# Patient Record
Sex: FEMALE | Race: OTHER
[De-identification: names, ages, dates, MRNs, and addresses within clinical notes are randomized per-mention and may not be internally consistent; named-entity substitution may affect disease eponyms.]

---

## 2019-03-20 PROBLEM — Z00.00 ENCOUNTER FOR PREVENTIVE HEALTH EXAMINATION: Status: ACTIVE | Noted: 2019-03-20

## 2019-04-25 ENCOUNTER — APPOINTMENT (OUTPATIENT)
Dept: NEUROLOGY | Facility: CLINIC | Age: 69
End: 2019-04-25

## 2020-10-07 ENCOUNTER — APPOINTMENT (OUTPATIENT)
Dept: NEUROLOGY | Facility: CLINIC | Age: 70
End: 2020-10-07
Payer: MEDICAID

## 2020-10-07 VITALS — BODY MASS INDEX: 24.1 KG/M2 | WEIGHT: 136 LBS | TEMPERATURE: 97.4 F | HEIGHT: 63 IN

## 2020-10-07 DIAGNOSIS — Z86.39 PERSONAL HISTORY OF OTHER ENDOCRINE, NUTRITIONAL AND METABOLIC DISEASE: ICD-10-CM

## 2020-10-07 DIAGNOSIS — Z86.79 PERSONAL HISTORY OF OTHER DISEASES OF THE CIRCULATORY SYSTEM: ICD-10-CM

## 2020-10-07 DIAGNOSIS — M48.02 SPINAL STENOSIS, CERVICAL REGION: ICD-10-CM

## 2020-10-07 DIAGNOSIS — I63.9 CEREBRAL INFARCTION, UNSPECIFIED: ICD-10-CM

## 2020-10-07 DIAGNOSIS — E11.42 TYPE 2 DIABETES MELLITUS WITH DIABETIC POLYNEUROPATHY: ICD-10-CM

## 2020-10-07 PROCEDURE — 99204 OFFICE O/P NEW MOD 45 MIN: CPT

## 2020-10-07 RX ORDER — GABAPENTIN 300 MG
300 TABLET ORAL
Refills: 0 | Status: ACTIVE | COMMUNITY

## 2020-10-07 RX ORDER — METFORMIN HYDROCHLORIDE 625 MG/1
TABLET ORAL
Refills: 0 | Status: ACTIVE | COMMUNITY

## 2020-10-07 RX ORDER — OMEPRAZOLE 20 MG/1
TABLET, DELAYED RELEASE ORAL
Refills: 0 | Status: ACTIVE | COMMUNITY

## 2020-10-07 RX ORDER — ATORVASTATIN CALCIUM 20 MG/1
20 TABLET, FILM COATED ORAL
Refills: 0 | Status: ACTIVE | COMMUNITY

## 2020-10-07 RX ORDER — LOSARTAN POTASSIUM AND HYDROCHLOROTHIAZIDE 12.5; 5 MG/1; MG/1
50-12.5 TABLET ORAL
Refills: 0 | Status: ACTIVE | COMMUNITY

## 2020-10-07 RX ORDER — SITAGLIPTIN 100 MG/1
TABLET, FILM COATED ORAL
Refills: 0 | Status: ACTIVE | COMMUNITY

## 2020-10-07 RX ORDER — ASPIRIN 81 MG
81 TABLET, DELAYED RELEASE (ENTERIC COATED) ORAL
Refills: 0 | Status: ACTIVE | COMMUNITY

## 2020-10-28 ENCOUNTER — APPOINTMENT (OUTPATIENT)
Dept: NEUROLOGY | Facility: CLINIC | Age: 70
End: 2020-10-28

## 2020-11-16 ENCOUNTER — NON-APPOINTMENT (OUTPATIENT)
Age: 70
End: 2020-11-16

## 2021-02-16 ENCOUNTER — INPATIENT (INPATIENT)
Facility: HOSPITAL | Age: 71
LOS: 5 days | Discharge: HOME HEALTH | DRG: 139 | End: 2021-02-22
Attending: HOSPITALIST | Admitting: INTERNAL MEDICINE
Payer: MEDICAID

## 2021-02-16 VITALS
HEIGHT: 68 IN | WEIGHT: 141.98 LBS | HEART RATE: 86 BPM | SYSTOLIC BLOOD PRESSURE: 250 MMHG | TEMPERATURE: 98 F | OXYGEN SATURATION: 86 % | RESPIRATION RATE: 18 BRPM | DIASTOLIC BLOOD PRESSURE: 100 MMHG

## 2021-02-16 DIAGNOSIS — J18.9 PNEUMONIA, UNSPECIFIED ORGANISM: ICD-10-CM

## 2021-02-16 DIAGNOSIS — R09.89 OTHER SPECIFIED SYMPTOMS AND SIGNS INVOLVING THE CIRCULATORY AND RESPIRATORY SYSTEMS: ICD-10-CM

## 2021-02-16 LAB
ADD ON TEST-SPECIMEN IN LAB: SIGNIFICANT CHANGE UP
ADD ON TEST-SPECIMEN IN LAB: SIGNIFICANT CHANGE UP
ALBUMIN SERPL ELPH-MCNC: 3.8 G/DL — SIGNIFICANT CHANGE UP (ref 3.3–5)
ALP SERPL-CCNC: 138 U/L — HIGH (ref 40–120)
ALT FLD-CCNC: 145 U/L — HIGH (ref 12–78)
ANION GAP SERPL CALC-SCNC: 6 MMOL/L — SIGNIFICANT CHANGE UP (ref 5–17)
APPEARANCE UR: CLEAR — SIGNIFICANT CHANGE UP
APTT BLD: 35.6 SEC — HIGH (ref 27.5–35.5)
AST SERPL-CCNC: 60 U/L — HIGH (ref 15–37)
BASE EXCESS BLDV CALC-SCNC: 0.4 MMOL/L — SIGNIFICANT CHANGE UP (ref -2–2)
BASOPHILS # BLD AUTO: 0.03 K/UL — SIGNIFICANT CHANGE UP (ref 0–0.2)
BASOPHILS NFR BLD AUTO: 0.4 % — SIGNIFICANT CHANGE UP (ref 0–2)
BILIRUB SERPL-MCNC: 0.8 MG/DL — SIGNIFICANT CHANGE UP (ref 0.2–1.2)
BILIRUB UR-MCNC: NEGATIVE — SIGNIFICANT CHANGE UP
BUN SERPL-MCNC: 16 MG/DL — SIGNIFICANT CHANGE UP (ref 7–23)
CALCIUM SERPL-MCNC: 10.4 MG/DL — HIGH (ref 8.5–10.1)
CHLORIDE SERPL-SCNC: 95 MMOL/L — LOW (ref 96–108)
CO2 SERPL-SCNC: 28 MMOL/L — SIGNIFICANT CHANGE UP (ref 22–31)
COLOR SPEC: YELLOW — SIGNIFICANT CHANGE UP
CREAT SERPL-MCNC: 0.71 MG/DL — SIGNIFICANT CHANGE UP (ref 0.5–1.3)
CRP SERPL-MCNC: 2.77 MG/DL — HIGH (ref 0–0.4)
D DIMER BLD IA.RAPID-MCNC: 1053 NG/ML DDU — HIGH
DIFF PNL FLD: NEGATIVE — SIGNIFICANT CHANGE UP
EOSINOPHIL # BLD AUTO: 0.14 K/UL — SIGNIFICANT CHANGE UP (ref 0–0.5)
EOSINOPHIL NFR BLD AUTO: 1.8 % — SIGNIFICANT CHANGE UP (ref 0–6)
FERRITIN SERPL-MCNC: 1095 NG/ML — HIGH (ref 15–150)
FIBRINOGEN PPP-MCNC: 658 MG/DL — HIGH (ref 290–520)
GLUCOSE SERPL-MCNC: 164 MG/DL — HIGH (ref 70–99)
GLUCOSE UR QL: NEGATIVE MG/DL — SIGNIFICANT CHANGE UP
HCO3 BLDV-SCNC: 26 MMOL/L — SIGNIFICANT CHANGE UP (ref 21–29)
HCT VFR BLD CALC: 37.9 % — SIGNIFICANT CHANGE UP (ref 34.5–45)
HGB BLD-MCNC: 12.6 G/DL — SIGNIFICANT CHANGE UP (ref 11.5–15.5)
IMM GRANULOCYTES NFR BLD AUTO: 0.6 % — SIGNIFICANT CHANGE UP (ref 0–1.5)
INR BLD: 1.09 RATIO — SIGNIFICANT CHANGE UP (ref 0.88–1.16)
KETONES UR-MCNC: NEGATIVE — SIGNIFICANT CHANGE UP
LACTATE SERPL-SCNC: 2.4 MMOL/L — HIGH (ref 0.7–2)
LDH SERPL L TO P-CCNC: 161 U/L — SIGNIFICANT CHANGE UP (ref 84–241)
LEUKOCYTE ESTERASE UR-ACNC: NEGATIVE — SIGNIFICANT CHANGE UP
LYMPHOCYTES # BLD AUTO: 0.97 K/UL — LOW (ref 1–3.3)
LYMPHOCYTES # BLD AUTO: 12.4 % — LOW (ref 13–44)
MCHC RBC-ENTMCNC: 29.7 PG — SIGNIFICANT CHANGE UP (ref 27–34)
MCHC RBC-ENTMCNC: 33.2 GM/DL — SIGNIFICANT CHANGE UP (ref 32–36)
MCV RBC AUTO: 89.4 FL — SIGNIFICANT CHANGE UP (ref 80–100)
MONOCYTES # BLD AUTO: 0.7 K/UL — SIGNIFICANT CHANGE UP (ref 0–0.9)
MONOCYTES NFR BLD AUTO: 8.9 % — SIGNIFICANT CHANGE UP (ref 2–14)
NEUTROPHILS # BLD AUTO: 5.96 K/UL — SIGNIFICANT CHANGE UP (ref 1.8–7.4)
NEUTROPHILS NFR BLD AUTO: 75.9 % — SIGNIFICANT CHANGE UP (ref 43–77)
NITRITE UR-MCNC: NEGATIVE — SIGNIFICANT CHANGE UP
PCO2 BLDV: 46 MMHG — SIGNIFICANT CHANGE UP (ref 35–50)
PH BLDV: 7.36 — SIGNIFICANT CHANGE UP (ref 7.35–7.45)
PH UR: 7 — SIGNIFICANT CHANGE UP (ref 5–8)
PLATELET # BLD AUTO: 126 K/UL — LOW (ref 150–400)
PO2 BLDV: 297 MMHG — HIGH (ref 25–45)
POTASSIUM SERPL-MCNC: 3.9 MMOL/L — SIGNIFICANT CHANGE UP (ref 3.5–5.3)
POTASSIUM SERPL-SCNC: 3.9 MMOL/L — SIGNIFICANT CHANGE UP (ref 3.5–5.3)
PROCALCITONIN SERPL-MCNC: 0.41 NG/ML — HIGH (ref 0.02–0.1)
PROT SERPL-MCNC: 8.4 GM/DL — HIGH (ref 6–8.3)
PROT UR-MCNC: 100 MG/DL
PROTHROM AB SERPL-ACNC: 12.6 SEC — SIGNIFICANT CHANGE UP (ref 10.6–13.6)
RAPID RVP RESULT: SIGNIFICANT CHANGE UP
RBC # BLD: 4.24 M/UL — SIGNIFICANT CHANGE UP (ref 3.8–5.2)
RBC # FLD: 15.6 % — HIGH (ref 10.3–14.5)
SAO2 % BLDV: 99 % — HIGH (ref 67–88)
SARS-COV-2 RNA SPEC QL NAA+PROBE: SIGNIFICANT CHANGE UP
SODIUM SERPL-SCNC: 129 MMOL/L — LOW (ref 135–145)
SP GR SPEC: 1.01 — SIGNIFICANT CHANGE UP (ref 1.01–1.02)
TROPONIN I SERPL-MCNC: 0.02 NG/ML — SIGNIFICANT CHANGE UP (ref 0.01–0.04)
TROPONIN I SERPL-MCNC: <0.015 NG/ML — SIGNIFICANT CHANGE UP (ref 0.01–0.04)
UROBILINOGEN FLD QL: NEGATIVE MG/DL — SIGNIFICANT CHANGE UP
WBC # BLD: 7.85 K/UL — SIGNIFICANT CHANGE UP (ref 3.8–10.5)
WBC # FLD AUTO: 7.85 K/UL — SIGNIFICANT CHANGE UP (ref 3.8–10.5)

## 2021-02-16 PROCEDURE — 81001 URINALYSIS AUTO W/SCOPE: CPT

## 2021-02-16 PROCEDURE — 85027 COMPLETE CBC AUTOMATED: CPT

## 2021-02-16 PROCEDURE — 82607 VITAMIN B-12: CPT

## 2021-02-16 PROCEDURE — 85025 COMPLETE CBC W/AUTO DIFF WBC: CPT

## 2021-02-16 PROCEDURE — 80053 COMPREHEN METABOLIC PANEL: CPT

## 2021-02-16 PROCEDURE — 93970 EXTREMITY STUDY: CPT

## 2021-02-16 PROCEDURE — 71275 CT ANGIOGRAPHY CHEST: CPT | Mod: 26

## 2021-02-16 PROCEDURE — 82962 GLUCOSE BLOOD TEST: CPT

## 2021-02-16 PROCEDURE — 99223 1ST HOSP IP/OBS HIGH 75: CPT | Mod: AI

## 2021-02-16 PROCEDURE — 71045 X-RAY EXAM CHEST 1 VIEW: CPT | Mod: 26

## 2021-02-16 PROCEDURE — 93005 ELECTROCARDIOGRAM TRACING: CPT

## 2021-02-16 PROCEDURE — 83010 ASSAY OF HAPTOGLOBIN QUANT: CPT

## 2021-02-16 PROCEDURE — 85045 AUTOMATED RETICULOCYTE COUNT: CPT

## 2021-02-16 PROCEDURE — 83540 ASSAY OF IRON: CPT

## 2021-02-16 PROCEDURE — 83036 HEMOGLOBIN GLYCOSYLATED A1C: CPT

## 2021-02-16 PROCEDURE — 86769 SARS-COV-2 COVID-19 ANTIBODY: CPT

## 2021-02-16 PROCEDURE — 80048 BASIC METABOLIC PNL TOTAL CA: CPT

## 2021-02-16 PROCEDURE — 36415 COLL VENOUS BLD VENIPUNCTURE: CPT

## 2021-02-16 PROCEDURE — 82728 ASSAY OF FERRITIN: CPT

## 2021-02-16 PROCEDURE — 82247 BILIRUBIN TOTAL: CPT

## 2021-02-16 PROCEDURE — 74177 CT ABD & PELVIS W/CONTRAST: CPT | Mod: 26

## 2021-02-16 PROCEDURE — 82248 BILIRUBIN DIRECT: CPT

## 2021-02-16 PROCEDURE — 71275 CT ANGIOGRAPHY CHEST: CPT

## 2021-02-16 PROCEDURE — 86803 HEPATITIS C AB TEST: CPT

## 2021-02-16 PROCEDURE — 83550 IRON BINDING TEST: CPT

## 2021-02-16 PROCEDURE — 93010 ELECTROCARDIOGRAM REPORT: CPT

## 2021-02-16 PROCEDURE — 84484 ASSAY OF TROPONIN QUANT: CPT

## 2021-02-16 PROCEDURE — 74177 CT ABD & PELVIS W/CONTRAST: CPT

## 2021-02-16 PROCEDURE — 93970 EXTREMITY STUDY: CPT | Mod: 26

## 2021-02-16 PROCEDURE — 93306 TTE W/DOPPLER COMPLETE: CPT

## 2021-02-16 PROCEDURE — 83605 ASSAY OF LACTIC ACID: CPT

## 2021-02-16 RX ORDER — HYDRALAZINE HCL 50 MG
10 TABLET ORAL EVERY 8 HOURS
Refills: 0 | Status: DISCONTINUED | OUTPATIENT
Start: 2021-02-16 | End: 2021-02-22

## 2021-02-16 RX ORDER — ASPIRIN/CALCIUM CARB/MAGNESIUM 324 MG
81 TABLET ORAL DAILY
Refills: 0 | Status: DISCONTINUED | OUTPATIENT
Start: 2021-02-16 | End: 2021-02-22

## 2021-02-16 RX ORDER — ENOXAPARIN SODIUM 100 MG/ML
40 INJECTION SUBCUTANEOUS DAILY
Refills: 0 | Status: DISCONTINUED | OUTPATIENT
Start: 2021-02-16 | End: 2021-02-22

## 2021-02-16 RX ORDER — IPRATROPIUM BROMIDE 0.2 MG/ML
1 SOLUTION, NON-ORAL INHALATION ONCE
Refills: 0 | Status: COMPLETED | OUTPATIENT
Start: 2021-02-16 | End: 2021-02-16

## 2021-02-16 RX ORDER — SENNA PLUS 8.6 MG/1
2 TABLET ORAL AT BEDTIME
Refills: 0 | Status: DISCONTINUED | OUTPATIENT
Start: 2021-02-16 | End: 2021-02-22

## 2021-02-16 RX ORDER — CEFEPIME 1 G/1
2000 INJECTION, POWDER, FOR SOLUTION INTRAMUSCULAR; INTRAVENOUS ONCE
Refills: 0 | Status: COMPLETED | OUTPATIENT
Start: 2021-02-16 | End: 2021-02-16

## 2021-02-16 RX ORDER — DEXTROSE 50 % IN WATER 50 %
12.5 SYRINGE (ML) INTRAVENOUS ONCE
Refills: 0 | Status: DISCONTINUED | OUTPATIENT
Start: 2021-02-16 | End: 2021-02-22

## 2021-02-16 RX ORDER — INSULIN GLARGINE 100 [IU]/ML
68 INJECTION, SOLUTION SUBCUTANEOUS AT BEDTIME
Refills: 0 | Status: DISCONTINUED | OUTPATIENT
Start: 2021-02-16 | End: 2021-02-19

## 2021-02-16 RX ORDER — POLYETHYLENE GLYCOL 3350 17 G/17G
17 POWDER, FOR SOLUTION ORAL EVERY 12 HOURS
Refills: 0 | Status: DISCONTINUED | OUTPATIENT
Start: 2021-02-16 | End: 2021-02-22

## 2021-02-16 RX ORDER — OXYBUTYNIN CHLORIDE 5 MG
10 TABLET ORAL
Refills: 0 | Status: DISCONTINUED | OUTPATIENT
Start: 2021-02-16 | End: 2021-02-16

## 2021-02-16 RX ORDER — SODIUM CHLORIDE 9 MG/ML
1000 INJECTION, SOLUTION INTRAVENOUS
Refills: 0 | Status: DISCONTINUED | OUTPATIENT
Start: 2021-02-16 | End: 2021-02-22

## 2021-02-16 RX ORDER — HYDRALAZINE HCL 50 MG
5 TABLET ORAL ONCE
Refills: 0 | Status: COMPLETED | OUTPATIENT
Start: 2021-02-16 | End: 2021-02-16

## 2021-02-16 RX ORDER — GABAPENTIN 400 MG/1
300 CAPSULE ORAL THREE TIMES A DAY
Refills: 0 | Status: DISCONTINUED | OUTPATIENT
Start: 2021-02-16 | End: 2021-02-22

## 2021-02-16 RX ORDER — DEXTROSE 50 % IN WATER 50 %
25 SYRINGE (ML) INTRAVENOUS ONCE
Refills: 0 | Status: DISCONTINUED | OUTPATIENT
Start: 2021-02-16 | End: 2021-02-22

## 2021-02-16 RX ORDER — GLUCAGON INJECTION, SOLUTION 0.5 MG/.1ML
1 INJECTION, SOLUTION SUBCUTANEOUS ONCE
Refills: 0 | Status: DISCONTINUED | OUTPATIENT
Start: 2021-02-16 | End: 2021-02-22

## 2021-02-16 RX ORDER — DEXAMETHASONE 0.5 MG/5ML
6 ELIXIR ORAL ONCE
Refills: 0 | Status: COMPLETED | OUTPATIENT
Start: 2021-02-16 | End: 2021-02-16

## 2021-02-16 RX ORDER — AZITHROMYCIN 500 MG/1
500 TABLET, FILM COATED ORAL DAILY
Refills: 0 | Status: DISCONTINUED | OUTPATIENT
Start: 2021-02-16 | End: 2021-02-21

## 2021-02-16 RX ORDER — TAMSULOSIN HYDROCHLORIDE 0.4 MG/1
0.4 CAPSULE ORAL AT BEDTIME
Refills: 0 | Status: DISCONTINUED | OUTPATIENT
Start: 2021-02-16 | End: 2021-02-22

## 2021-02-16 RX ORDER — INSULIN LISPRO 100/ML
VIAL (ML) SUBCUTANEOUS AT BEDTIME
Refills: 0 | Status: DISCONTINUED | OUTPATIENT
Start: 2021-02-16 | End: 2021-02-22

## 2021-02-16 RX ORDER — CEFEPIME 1 G/1
2000 INJECTION, POWDER, FOR SOLUTION INTRAMUSCULAR; INTRAVENOUS ONCE
Refills: 0 | Status: DISCONTINUED | OUTPATIENT
Start: 2021-02-16 | End: 2021-02-16

## 2021-02-16 RX ORDER — ACETAMINOPHEN 500 MG
650 TABLET ORAL EVERY 4 HOURS
Refills: 0 | Status: DISCONTINUED | OUTPATIENT
Start: 2021-02-16 | End: 2021-02-22

## 2021-02-16 RX ORDER — INSULIN LISPRO 100/ML
VIAL (ML) SUBCUTANEOUS
Refills: 0 | Status: DISCONTINUED | OUTPATIENT
Start: 2021-02-16 | End: 2021-02-22

## 2021-02-16 RX ORDER — ALBUTEROL 90 UG/1
2 AEROSOL, METERED ORAL ONCE
Refills: 0 | Status: COMPLETED | OUTPATIENT
Start: 2021-02-16 | End: 2021-02-16

## 2021-02-16 RX ORDER — SODIUM CHLORIDE 9 MG/ML
1 INJECTION INTRAMUSCULAR; INTRAVENOUS; SUBCUTANEOUS
Refills: 0 | Status: DISCONTINUED | OUTPATIENT
Start: 2021-02-16 | End: 2021-02-22

## 2021-02-16 RX ORDER — ATORVASTATIN CALCIUM 80 MG/1
20 TABLET, FILM COATED ORAL AT BEDTIME
Refills: 0 | Status: DISCONTINUED | OUTPATIENT
Start: 2021-02-16 | End: 2021-02-22

## 2021-02-16 RX ORDER — BETHANECHOL CHLORIDE 25 MG
25 TABLET ORAL EVERY 8 HOURS
Refills: 0 | Status: DISCONTINUED | OUTPATIENT
Start: 2021-02-16 | End: 2021-02-17

## 2021-02-16 RX ORDER — CEFTRIAXONE 500 MG/1
INJECTION, POWDER, FOR SOLUTION INTRAMUSCULAR; INTRAVENOUS
Refills: 0 | Status: COMPLETED | OUTPATIENT
Start: 2021-02-16 | End: 2021-02-22

## 2021-02-16 RX ORDER — ONDANSETRON 8 MG/1
4 TABLET, FILM COATED ORAL EVERY 6 HOURS
Refills: 0 | Status: DISCONTINUED | OUTPATIENT
Start: 2021-02-16 | End: 2021-02-22

## 2021-02-16 RX ORDER — CEFTRIAXONE 500 MG/1
1000 INJECTION, POWDER, FOR SOLUTION INTRAMUSCULAR; INTRAVENOUS ONCE
Refills: 0 | Status: COMPLETED | OUTPATIENT
Start: 2021-02-16 | End: 2021-02-16

## 2021-02-16 RX ORDER — PANTOPRAZOLE SODIUM 20 MG/1
40 TABLET, DELAYED RELEASE ORAL DAILY
Refills: 0 | Status: DISCONTINUED | OUTPATIENT
Start: 2021-02-16 | End: 2021-02-22

## 2021-02-16 RX ORDER — DEXTROSE 50 % IN WATER 50 %
15 SYRINGE (ML) INTRAVENOUS ONCE
Refills: 0 | Status: DISCONTINUED | OUTPATIENT
Start: 2021-02-16 | End: 2021-02-22

## 2021-02-16 RX ORDER — CEFTRIAXONE 500 MG/1
1000 INJECTION, POWDER, FOR SOLUTION INTRAMUSCULAR; INTRAVENOUS EVERY 24 HOURS
Refills: 0 | Status: COMPLETED | OUTPATIENT
Start: 2021-02-17 | End: 2021-02-21

## 2021-02-16 RX ORDER — LOSARTAN POTASSIUM 100 MG/1
100 TABLET, FILM COATED ORAL DAILY
Refills: 0 | Status: DISCONTINUED | OUTPATIENT
Start: 2021-02-16 | End: 2021-02-22

## 2021-02-16 RX ADMIN — ALBUTEROL 2 PUFF(S): 90 AEROSOL, METERED ORAL at 13:24

## 2021-02-16 RX ADMIN — SENNA PLUS 2 TABLET(S): 8.6 TABLET ORAL at 22:13

## 2021-02-16 RX ADMIN — Medication 6 MILLIGRAM(S): at 14:51

## 2021-02-16 RX ADMIN — ATORVASTATIN CALCIUM 20 MILLIGRAM(S): 80 TABLET, FILM COATED ORAL at 22:13

## 2021-02-16 RX ADMIN — Medication 5 MILLIGRAM(S): at 14:52

## 2021-02-16 RX ADMIN — AZITHROMYCIN 500 MILLIGRAM(S): 500 TABLET, FILM COATED ORAL at 17:32

## 2021-02-16 RX ADMIN — ENOXAPARIN SODIUM 40 MILLIGRAM(S): 100 INJECTION SUBCUTANEOUS at 17:42

## 2021-02-16 RX ADMIN — Medication 1 PUFF(S): at 13:24

## 2021-02-16 RX ADMIN — PANTOPRAZOLE SODIUM 40 MILLIGRAM(S): 20 TABLET, DELAYED RELEASE ORAL at 15:50

## 2021-02-16 RX ADMIN — CEFEPIME 100 MILLIGRAM(S): 1 INJECTION, POWDER, FOR SOLUTION INTRAMUSCULAR; INTRAVENOUS at 15:50

## 2021-02-16 RX ADMIN — TAMSULOSIN HYDROCHLORIDE 0.4 MILLIGRAM(S): 0.4 CAPSULE ORAL at 22:13

## 2021-02-16 RX ADMIN — Medication 100 MILLIGRAM(S): at 13:25

## 2021-02-16 RX ADMIN — Medication 2: at 17:32

## 2021-02-16 RX ADMIN — Medication 1 TABLET(S): at 17:42

## 2021-02-16 RX ADMIN — CEFTRIAXONE 1000 MILLIGRAM(S): 500 INJECTION, POWDER, FOR SOLUTION INTRAMUSCULAR; INTRAVENOUS at 17:33

## 2021-02-16 RX ADMIN — LOSARTAN POTASSIUM 100 MILLIGRAM(S): 100 TABLET, FILM COATED ORAL at 17:32

## 2021-02-16 RX ADMIN — Medication 2: at 22:13

## 2021-02-16 RX ADMIN — GABAPENTIN 300 MILLIGRAM(S): 400 CAPSULE ORAL at 22:13

## 2021-02-16 NOTE — ED PROVIDER NOTE - ENMT, MLM
Oropharynx clear. Airway patent, Nasal mucosa clear. Mouth with mildly dry mucosa. Throat has no vesicles, no oropharyngeal exudates and uvula is midline. <<-----Click on this checkbox to enter Post-Op Dx

## 2021-02-16 NOTE — ED PROVIDER NOTE - CARE PLAN
Principal Discharge DX:	Bilateral pneumonia  Secondary Diagnosis:	Suspected 2019-nCoV infection  Secondary Diagnosis:	Hypoxia

## 2021-02-16 NOTE — H&P ADULT - NSHPLABSRESULTS_GEN_ALL_CORE
12.6   7.85  )-----------( 126      ( 16 Feb 2021 13:06 )             37.9     16 Feb 2021 13:06    129    |  95     |  16     ----------------------------<  164    3.9     |  28     |  0.71     Ca    10.4       16 Feb 2021 13:06    TPro  8.4    /  Alb  3.8    /  TBili  0.8    /  DBili  x      /  AST  60     /  ALT  145    /  AlkPhos  138    16 Feb 2021 13:06    LIVER FUNCTIONS - ( 16 Feb 2021 13:06 )  Alb: 3.8 g/dL / Pro: 8.4 gm/dL / ALK PHOS: 138 U/L / ALT: 145 U/L / AST: 60 U/L / GGT: x           PT/INR - ( 16 Feb 2021 13:06 )   PT: 12.6 sec;   INR: 1.09 ratio      PTT - ( 16 Feb 2021 13:06 )  PTT:35.6 sec    CARDIAC MARKERS ( 16 Feb 2021 13:06 )  <0.015 ng/mL / x     / x     / x     / x

## 2021-02-16 NOTE — H&P ADULT - HISTORY OF PRESENT ILLNESS
71yo/F with PMH CVA about 8 years ago on Aspirin/statin, chronic hyponatremia, diabetes w/diabetic neuropathy presented for evaluation of worsening SOB. Patient unable to provide clear history, mainly obtained from daughter over the phone. Per daughter, she was OK yesterday, but woke up today SOB and they send her in to the hospital

## 2021-02-16 NOTE — ED ADULT NURSE NOTE - OBJECTIVE STATEMENT
Pt is Urdue speaking with - [id number], pt presents with new onset respiratory distress starting this morning as per pt, pt observed with labored/shallow breathing to er and placed on o2 nc3L, pt with distended abd and previous surgical procedures, pt denies pain, chest pain, states she can't talk due to her difficulty breathing, nsr on cm rate-70's, stretcher in lowest position, call bell at side, will continue to monitor.

## 2021-02-16 NOTE — ED ADULT TRIAGE NOTE - CHIEF COMPLAINT QUOTE
pt presents to ED with complaints of weakness and SOB. per EMS, pt was hypoxic and placed on 8L O2 via NC prior to arrival, but EMS unaware of what O2 sat was. pt 86% on RA in triage. pt brought to room for EKG and placed on 2L O2 via NC.

## 2021-02-16 NOTE — ED PROVIDER NOTE - CLINICAL SUMMARY MEDICAL DECISION MAKING FREE TEXT BOX
69 y/o female with a PMHx of CVA, DM with peripheral neuropathy presents to the ED BIBA from home 71 y/o female with a PMHx of CVA, DM with peripheral neuropathy presents to the ED BIBA from home regarding SOB since this morning. Pt denies cough, fevrs, chills, CP, abd pain. +hypoxic. Crackles on exam. High concern for COVID. Plan: EKG, O2 supplementation, chest XR, labs including COVID/RVP swab, pan culture, COVID inflammatory markers, BNP, troponin, COVID precautions, Albuterol/Atrovent MDI, Tesslaon PO, monitor, observe, reassess, admit    High ddimer GFR good. Ordering CTA chest.

## 2021-02-16 NOTE — ED PROVIDER NOTE - CONSTITUTIONAL, MLM
Elderly female acutely ill appearing, awake, alert, oriented to person, place, time/situation and in mild to moderate respiratory distress. normal...

## 2021-02-16 NOTE — ED PROVIDER NOTE - OBJECTIVE STATEMENT
69 y/o female with a PMHx of CVA, DM with peripheral neuropathy presents to the ED BIBA from home regarding SOB onset 7am today. +generalized weakness. Denies associated CP, cough, fever, abd pain, leg swelling. No known sick contacts. Unable to obtain complete hx due to severity of illness. Alban  ID#: [id number].

## 2021-02-16 NOTE — ED PROVIDER NOTE - PROGRESS NOTE DETAILS
Gregg Ruiz for attending Dr. Arias: Spoke with pt's son Ammy SALVADOR at [phone]. Reports rapidly progressive SOB onset this morning. No fevers, cough, chills, CP. +abd pain yesterday but not today. Made aware that pt is ill, requires O2 and will be admitted. Dr. Arias:  Hypoxia improved on O2 suppl.  CXR + severe b/l infilts, high concern for COVID.  IV cefepime, Decadron ordered.  BNP, troponin negative. CTA chest with CT A/P ordered re: high DD & abd findings w/ h/o abd pain yesterday.  Dr. Bernard aware to f/u CT results.  Despite elevated serum lactate level, sepsis IVF NOT indicated due to high concern for COVID dz.

## 2021-02-16 NOTE — CHART NOTE - NSCHARTNOTEFT_GEN_A_CORE
CTA chest/abd reviewed- no PE, +b/l hydroureteronephrosis with bladder distention/?obstruction. Possible pulm edema    Plan:  Lovenox proph dose 40mg daily  Place Cummings. DC Oxybutinin and start on Flomax and Bethanechol. Urology eval DR Mace  BNP low, doubt pulm edema. Will order ECHO to check for EF  BP on a higher side- start Hydralazine 10mg TID and adjust further  Bowel regimen

## 2021-02-16 NOTE — H&P ADULT - ASSESSMENT
# #B/l pulmonary infiltrates- multifocal pneumonia  #Acute hypoxic resp failure  Admit to med-surg  Titrate O2 to keep sat >94%  High suspicion for COVID, but swab is negative  High D-dimer- high suspicion for PE- CTA chest done, report pending  Will cover with Rocephin+Zithro for CAP  ID eval fr furth IV abx  Pulm eval DR Thompson  Albuterol MDI  Elevated lactate- doubt sepsis, WBC wnl and hemodynamics are stable    #H/o CVA  Cont Aspirin, statin    #Diabetes  Lantus +ISS  Hold oral hypoglycemic meds    #Hypercalcemia- on Ca supplement. Will stop and monitor    #Chr hyponatremia  ON NaCl tab at home- will increase to BID and monitor BMP    #HTN  Resume home meds  Adjust further if remains elevated    #COVID neg    #DVT proph- Lovenox    #Dispo- inpatient admit. D/w daughter Ammy over the phone [phone]

## 2021-02-16 NOTE — ED PROVIDER NOTE - GASTROINTESTINAL, MLM
Abdomen soft, non-tender, no guarding. Abd distended. Abdomen soft, no guarding. Abd distended. ? Mild abd tender, + distended.

## 2021-02-16 NOTE — ED PROVIDER NOTE - RESPIRATORY, MLM
Crackles right lower third to half. Left decreased breath sounds lower lung field. Mild retractions.

## 2021-02-16 NOTE — H&P ADULT - NSHPPHYSICALEXAM_GEN_ALL_CORE
Vital Signs Last 24 Hrs  T(C): 36.6 (16 Feb 2021 12:39), Max: 36.6 (16 Feb 2021 12:39)  T(F): 97.8 (16 Feb 2021 12:39), Max: 97.8 (16 Feb 2021 12:39)  HR: 73 (16 Feb 2021 15:06) (73 - 86)  BP: 160/60 (16 Feb 2021 15:06) (160/60 - 250/100)  BP(mean): 91 (16 Feb 2021 15:06) (91 - 116)  RR: 18 (16 Feb 2021 12:39) (18 - 18)  SpO2: 96% (16 Feb 2021 13:55) (86% - 96%)

## 2021-02-16 NOTE — PROVIDER CONTACT NOTE (OTHER) - SITUATION
LEFT MESSAGE WITH LIU FROM DR. BRANDON LOPEZ'S ANSWERING SERVICE
LEFT MESSAGE WITH DAYLIN FROM DR. KAM'S ANSWERING SERVICE.

## 2021-02-17 LAB
A1C WITH ESTIMATED AVERAGE GLUCOSE RESULT: 5.9 % — HIGH (ref 4–5.6)
ANION GAP SERPL CALC-SCNC: 7 MMOL/L — SIGNIFICANT CHANGE UP (ref 5–17)
BUN SERPL-MCNC: 19 MG/DL — SIGNIFICANT CHANGE UP (ref 7–23)
CALCIUM SERPL-MCNC: 9.7 MG/DL — SIGNIFICANT CHANGE UP (ref 8.5–10.1)
CHLORIDE SERPL-SCNC: 95 MMOL/L — LOW (ref 96–108)
CO2 SERPL-SCNC: 27 MMOL/L — SIGNIFICANT CHANGE UP (ref 22–31)
CREAT SERPL-MCNC: 0.71 MG/DL — SIGNIFICANT CHANGE UP (ref 0.5–1.3)
ESTIMATED AVERAGE GLUCOSE: 123 MG/DL — HIGH (ref 68–114)
GLUCOSE SERPL-MCNC: 202 MG/DL — HIGH (ref 70–99)
HCT VFR BLD CALC: 33.8 % — LOW (ref 34.5–45)
HCV AB S/CO SERPL IA: 0.08 S/CO — SIGNIFICANT CHANGE UP (ref 0–0.99)
HCV AB SERPL-IMP: SIGNIFICANT CHANGE UP
HGB BLD-MCNC: 11.3 G/DL — LOW (ref 11.5–15.5)
MCHC RBC-ENTMCNC: 29.2 PG — SIGNIFICANT CHANGE UP (ref 27–34)
MCHC RBC-ENTMCNC: 33.4 GM/DL — SIGNIFICANT CHANGE UP (ref 32–36)
MCV RBC AUTO: 87.3 FL — SIGNIFICANT CHANGE UP (ref 80–100)
PLATELET # BLD AUTO: 141 K/UL — LOW (ref 150–400)
POTASSIUM SERPL-MCNC: 4.3 MMOL/L — SIGNIFICANT CHANGE UP (ref 3.5–5.3)
POTASSIUM SERPL-SCNC: 4.3 MMOL/L — SIGNIFICANT CHANGE UP (ref 3.5–5.3)
RBC # BLD: 3.87 M/UL — SIGNIFICANT CHANGE UP (ref 3.8–5.2)
RBC # FLD: 14.9 % — HIGH (ref 10.3–14.5)
SARS-COV-2 IGG SERPL QL IA: POSITIVE
SARS-COV-2 IGM SERPL IA-ACNC: 2.24 INDEX — HIGH
SODIUM SERPL-SCNC: 129 MMOL/L — LOW (ref 135–145)
WBC # BLD: 6.61 K/UL — SIGNIFICANT CHANGE UP (ref 3.8–10.5)
WBC # FLD AUTO: 6.61 K/UL — SIGNIFICANT CHANGE UP (ref 3.8–10.5)

## 2021-02-17 PROCEDURE — 99233 SBSQ HOSP IP/OBS HIGH 50: CPT

## 2021-02-17 PROCEDURE — 93306 TTE W/DOPPLER COMPLETE: CPT | Mod: 26

## 2021-02-17 PROCEDURE — 93010 ELECTROCARDIOGRAM REPORT: CPT

## 2021-02-17 RX ORDER — BETHANECHOL CHLORIDE 25 MG
50 TABLET ORAL
Refills: 0 | Status: DISCONTINUED | OUTPATIENT
Start: 2021-02-17 | End: 2021-02-22

## 2021-02-17 RX ADMIN — Medication 10 MILLIGRAM(S): at 04:05

## 2021-02-17 RX ADMIN — Medication 81 MILLIGRAM(S): at 09:02

## 2021-02-17 RX ADMIN — GABAPENTIN 300 MILLIGRAM(S): 400 CAPSULE ORAL at 21:02

## 2021-02-17 RX ADMIN — Medication 10 MILLIGRAM(S): at 12:04

## 2021-02-17 RX ADMIN — GABAPENTIN 300 MILLIGRAM(S): 400 CAPSULE ORAL at 05:15

## 2021-02-17 RX ADMIN — Medication 50 MILLIGRAM(S): at 21:02

## 2021-02-17 RX ADMIN — Medication 25 MILLIGRAM(S): at 04:06

## 2021-02-17 RX ADMIN — POLYETHYLENE GLYCOL 3350 17 GRAM(S): 17 POWDER, FOR SOLUTION ORAL at 09:03

## 2021-02-17 RX ADMIN — GABAPENTIN 300 MILLIGRAM(S): 400 CAPSULE ORAL at 11:54

## 2021-02-17 RX ADMIN — Medication 1 TABLET(S): at 09:02

## 2021-02-17 RX ADMIN — Medication 2: at 21:03

## 2021-02-17 RX ADMIN — CEFTRIAXONE 1000 MILLIGRAM(S): 500 INJECTION, POWDER, FOR SOLUTION INTRAMUSCULAR; INTRAVENOUS at 16:14

## 2021-02-17 RX ADMIN — SENNA PLUS 2 TABLET(S): 8.6 TABLET ORAL at 21:01

## 2021-02-17 RX ADMIN — SODIUM CHLORIDE 1 GRAM(S): 9 INJECTION INTRAMUSCULAR; INTRAVENOUS; SUBCUTANEOUS at 09:03

## 2021-02-17 RX ADMIN — INSULIN GLARGINE 68 UNIT(S): 100 INJECTION, SOLUTION SUBCUTANEOUS at 21:03

## 2021-02-17 RX ADMIN — ATORVASTATIN CALCIUM 20 MILLIGRAM(S): 80 TABLET, FILM COATED ORAL at 21:02

## 2021-02-17 RX ADMIN — TAMSULOSIN HYDROCHLORIDE 0.4 MILLIGRAM(S): 0.4 CAPSULE ORAL at 21:01

## 2021-02-17 RX ADMIN — POLYETHYLENE GLYCOL 3350 17 GRAM(S): 17 POWDER, FOR SOLUTION ORAL at 21:03

## 2021-02-17 RX ADMIN — Medication 6: at 05:15

## 2021-02-17 RX ADMIN — PANTOPRAZOLE SODIUM 40 MILLIGRAM(S): 20 TABLET, DELAYED RELEASE ORAL at 09:03

## 2021-02-17 RX ADMIN — Medication 650 MILLIGRAM(S): at 12:47

## 2021-02-17 RX ADMIN — SODIUM CHLORIDE 1 GRAM(S): 9 INJECTION INTRAMUSCULAR; INTRAVENOUS; SUBCUTANEOUS at 21:02

## 2021-02-17 RX ADMIN — Medication 4: at 11:54

## 2021-02-17 RX ADMIN — LOSARTAN POTASSIUM 100 MILLIGRAM(S): 100 TABLET, FILM COATED ORAL at 09:03

## 2021-02-17 RX ADMIN — Medication 30 MILLILITER(S): at 14:56

## 2021-02-17 RX ADMIN — AZITHROMYCIN 500 MILLIGRAM(S): 500 TABLET, FILM COATED ORAL at 09:03

## 2021-02-17 RX ADMIN — ENOXAPARIN SODIUM 40 MILLIGRAM(S): 100 INJECTION SUBCUTANEOUS at 09:03

## 2021-02-17 RX ADMIN — Medication 6: at 16:18

## 2021-02-17 NOTE — PROGRESS NOTE ADULT - SUBJECTIVE AND OBJECTIVE BOX
69yo/F with PMH CVA about 8 years ago on Aspirin/statin, chronic hyponatremia, diabetes w/diabetic neuropathy presented for evaluation of worsening SOB. Patient unable to provide clear history, mainly obtained from daughter over the phone. Per daughter, she was OK yesterday, but woke up today SOB and they send her in to the hospital.        21: Patient seen and examined. Still with sob. Discussed with patient regarding management and d/c plan.   Discussed with her daughter and son in law regarding management plan.       Vital Signs Last 24 Hrs  T(C): 37 (2021 11:19), Max: 37 (2021 11:19)  T(F): 98.6 (2021 11:19), Max: 98.6 (2021 11:19)  HR: 82 (2021 12:48) (71 - 83)  BP: 159/50 (2021 12:48) (122/40 - 160/60)  BP(mean): 66 (2021 11:19) (63 - 91)  RR: 20 (2021 12:48) (18 - 23)  SpO2: 97% (2021 12:48) (96% - 99%)      Physical Exam:  · Constitutional	Well-developed, well nourished  · Neck	No bruits; no thyromegaly or nodules  · Back	No deformity or limitation of movement  · Respiratory	detailed exam  · Respiratory Comments	coarse rhonchi  · Cardiovascular	Regular rate & rhythm, normal S1, S2; no murmurs, gallops or rubs; no S3, S4  · Gastrointestinal	Soft, non-tender, no hepatosplenomegaly, normal bowel sounds  · Genitourinary	Normal genitalia; no lesions; no discharge  · Extremities	No cyanosis, clubbing or edema  · Neurological	Alert & oriented; no sensory, motor or coordination deficits, normal reflexes  · Skin	No lesions; no rash  · Musculoskeletal	No joint pain, swelling or deformity; no limitation of movement                              11.3   6.61  )-----------( 141      ( 2021 07:20 )             33.8     2021 07:20    129    |  95     |  19     ----------------------------<  202    4.3     |  27     |  0.71     Ca    9.7        2021 07:20    TPro  8.4    /  Alb  3.8    /  TBili  0.8    /  DBili  x      /  AST  60     /  ALT  145    /  AlkPhos  138    2021 13:06    LIVER FUNCTIONS - ( 2021 13:06 )  Alb: 3.8 g/dL / Pro: 8.4 gm/dL / ALK PHOS: 138 U/L / ALT: 145 U/L / AST: 60 U/L / GGT: x           PT/INR - ( 2021 13:06 )   PT: 12.6 sec;   INR: 1.09 ratio         PTT - ( 2021 13:06 )  PTT:35.6 sec  CAPILLARY BLOOD GLUCOSE      POCT Blood Glucose.: 243 mg/dL (2021 11:16)  POCT Blood Glucose.: 253 mg/dL (2021 05:09)  POCT Blood Glucose.: 257 mg/dL (2021 04:04)  POCT Blood Glucose.: 266 mg/dL (2021 21:56)  POCT Blood Glucose.: 178 mg/dL (2021 16:45)    CARDIAC MARKERS ( 2021 20:20 )  0.018 ng/mL / x     / x     / x     / x      CARDIAC MARKERS ( 2021 17:02 )  <0.015 ng/mL / x     / x     / x     / x      CARDIAC MARKERS ( 2021 13:06 )  <0.015 ng/mL / x     / x     / x     / x          Urinalysis Basic - ( 2021 17:59 )    Color: Yellow / Appearance: Clear / S.010 / pH: x  Gluc: x / Ketone: Negative  / Bili: Negative / Urobili: Negative mg/dL   Blood: x / Protein: 100 mg/dL / Nitrite: Negative   Leuk Esterase: Negative / RBC: 0-2 /HPF / WBC Negative   Sq Epi: x / Non Sq Epi: Negative / Bacteria: Occasional          MEDICATIONS  (STANDING):  aspirin enteric coated 81 milliGRAM(s) Oral daily  atorvastatin 20 milliGRAM(s) Oral at bedtime  azithromycin   Tablet 500 milliGRAM(s) Oral daily  bethanechol 50 milliGRAM(s) Oral two times a day  cefTRIAXone Injectable. 1000 milliGRAM(s) IV Push every 24 hours  cefTRIAXone Injectable.      dextrose 40% Gel 15 Gram(s) Oral once  dextrose 5%. 1000 milliLiter(s) (50 mL/Hr) IV Continuous <Continuous>  dextrose 5%. 1000 milliLiter(s) (100 mL/Hr) IV Continuous <Continuous>  dextrose 50% Injectable 25 Gram(s) IV Push once  dextrose 50% Injectable 12.5 Gram(s) IV Push once  dextrose 50% Injectable 25 Gram(s) IV Push once  enoxaparin Injectable 40 milliGRAM(s) SubCutaneous daily  gabapentin 300 milliGRAM(s) Oral three times a day  glucagon  Injectable 1 milliGRAM(s) IntraMuscular once  hydrALAZINE 10 milliGRAM(s) Oral every 8 hours  insulin glargine Injectable (LANTUS) 68 Unit(s) SubCutaneous at bedtime  insulin lispro (ADMELOG) corrective regimen sliding scale   SubCutaneous three times a day before meals  insulin lispro (ADMELOG) corrective regimen sliding scale   SubCutaneous at bedtime  losartan 100 milliGRAM(s) Oral daily  multivitamin 1 Tablet(s) Oral daily  pantoprazole    Tablet 40 milliGRAM(s) Oral daily  polyethylene glycol 3350 17 Gram(s) Oral every 12 hours  senna 2 Tablet(s) Oral at bedtime  sodium chloride 1 Gram(s) Oral two times a day  tamsulosin 0.4 milliGRAM(s) Oral at bedtime    MEDICATIONS  (PRN):  acetaminophen   Tablet .. 650 milliGRAM(s) Oral every 4 hours PRN Mild Pain (1 - 3)  aluminum hydroxide/magnesium hydroxide/simethicone Suspension 30 milliLiter(s) Oral every 4 hours PRN Dyspepsia  ondansetron Injectable 4 milliGRAM(s) IV Push every 6 hours PRN Nausea            Assessment and Plan:   Assessment:  · Assessment	  #B/l pulmonary infiltrates- multifocal pneumonia  #Acute hypoxic resp failure  Titrate O2 to keep sat >94%  High suspicion for COVID, but swab is negative  No PE  Continue IV Rocephin+Zithro for CAP  ID eval fr furth IV abx  Pulm eval DR Thompson appreciated  Albuterol MDI  Elevated lactate- doubt sepsis, WBC wnl and hemodynamics are stable    Choledocholithiasis-asymptomatic  Discussed with Dr Lewis-follow up with him as an outpatient    #H/o CVA  Cont Aspirin, statin    #Diabetes  Lantus +ISS  Hold oral hypoglycemic meds    #Hypercalcemia- resolved    #Chr hyponatremia  ON NaCl tab at home- will increase to BID and monitor BMP    #HTN  Resume home meds  Adjust further if remains elevated    #COVID neg    #DVT proph- Lovenox

## 2021-02-17 NOTE — ED ADULT NURSE REASSESSMENT NOTE - NS ED NURSE REASSESS COMMENT FT1
Pt complaining of chest pain, updated Dr. Deleon. EKG obtained. VSS on 2L NC. Safety and comfort measures in place. Hourly rounding will be done on my time. Will continue to monitor.

## 2021-02-17 NOTE — ED ADULT NURSE REASSESSMENT NOTE - NS ED NURSE REASSESS COMMENT FT1
Patient received from previous nurse. Pt is A&Ox4, VSS on O2. Pt is resting comfortably in their bed at this time, denies any pain or discomfort at this time. Safety and comfort measures in place. Cummings in place, draining without any complications. Hourly rounding will be done on my time. Will continue to monitor.

## 2021-02-17 NOTE — ED ADULT NURSE REASSESSMENT NOTE - NS ED NURSE REASSESS COMMENT FT1
Pt received from main er via stretcher. placed on bed comfortably. vital signs taken. afebrile. sob on exertion. bruce in placed drainnig yellow urine. had a smear of stool in the diaper. isolation prec initiated. will cont to monitor. safety and comfort maintained. provided call bell.

## 2021-02-17 NOTE — CONSULT NOTE ADULT - SUBJECTIVE AND OBJECTIVE BOX
Patient is a 70y old  Female who presents with a chief complaint of Worsening SOB (2021 13:58)    HPI:  69yo/F with PMH CVA about 8 years ago on Aspirin/statin, chronic hyponatremia, diabetes w/diabetic neuropathy admitted on  for evaluation of shortness of breath of one day duration. The patient does note mid sternal pain with deep inspiration. The patient is poor historian and history per medical record.         PMH: as above  PSH: as above  Meds: per reconciliation sheet, noted below  MEDICATIONS  (STANDING):  aspirin enteric coated 81 milliGRAM(s) Oral daily  atorvastatin 20 milliGRAM(s) Oral at bedtime  azithromycin   Tablet 500 milliGRAM(s) Oral daily  bethanechol 50 milliGRAM(s) Oral two times a day  cefTRIAXone Injectable. 1000 milliGRAM(s) IV Push every 24 hours  cefTRIAXone Injectable.      dextrose 40% Gel 15 Gram(s) Oral once  dextrose 5%. 1000 milliLiter(s) (50 mL/Hr) IV Continuous <Continuous>  dextrose 5%. 1000 milliLiter(s) (100 mL/Hr) IV Continuous <Continuous>  dextrose 50% Injectable 25 Gram(s) IV Push once  dextrose 50% Injectable 12.5 Gram(s) IV Push once  dextrose 50% Injectable 25 Gram(s) IV Push once  enoxaparin Injectable 40 milliGRAM(s) SubCutaneous daily  gabapentin 300 milliGRAM(s) Oral three times a day  glucagon  Injectable 1 milliGRAM(s) IntraMuscular once  hydrALAZINE 10 milliGRAM(s) Oral every 8 hours  insulin glargine Injectable (LANTUS) 68 Unit(s) SubCutaneous at bedtime  insulin lispro (ADMELOG) corrective regimen sliding scale   SubCutaneous three times a day before meals  insulin lispro (ADMELOG) corrective regimen sliding scale   SubCutaneous at bedtime  losartan 100 milliGRAM(s) Oral daily  multivitamin 1 Tablet(s) Oral daily  pantoprazole    Tablet 40 milliGRAM(s) Oral daily  polyethylene glycol 3350 17 Gram(s) Oral every 12 hours  senna 2 Tablet(s) Oral at bedtime  sodium chloride 1 Gram(s) Oral two times a day  tamsulosin 0.4 milliGRAM(s) Oral at bedtime    MEDICATIONS  (PRN):  acetaminophen   Tablet .. 650 milliGRAM(s) Oral every 4 hours PRN Mild Pain (1 - 3)  aluminum hydroxide/magnesium hydroxide/simethicone Suspension 30 milliLiter(s) Oral every 4 hours PRN Dyspepsia  ondansetron Injectable 4 milliGRAM(s) IV Push every 6 hours PRN Nausea    Allergies    No Known Allergies    Intolerances      Social: no smoking, no alcohol, no illegal drugs; no recent travel, no exposure to TB  FAMILY HISTORY:  FH: diabetes mellitus       ROS unable to obtain secondary to patient medical condition     Vital Signs Last 24 Hrs  T(C): 37 (2021 11:19), Max: 37 (2021 11:19)  T(F): 98.6 (2021 11:19), Max: 98.6 (2021 11:19)  HR: 82 (2021 12:48) (71 - 83)  BP: 159/50 (2021 12:48) (122/40 - 159/50)  BP(mean): 66 (2021 11:19) (63 - 91)  RR: 20 (2021 12:48) (18 - 23)  SpO2: 97% (2021 12:48) (96% - 99%)  Daily     Daily     PE:    Constitutional: frail looking  HEENT: NC/AT, EOMI, PERRLA, conjunctivae clear; ears and nose atraumatic; pharynx clear  Neck: supple; thyroid not palpable  Back: no tenderness  Respiratory: respiratory effort normal; clear to auscultation  Cardiovascular: S1S2 regular, no murmurs  Abdomen: soft, not tender, not distended, positive BS; no liver or spleen organomegaly  Genitourinary: no suprapubic tenderness  Musculoskeletal: no muscle tenderness, no joint swelling or tenderness  Neurological/ Psychiatric:  moving all extremities  Skin: no rashes; no palpable lesions    Labs: all available labs reviewed                        11.3   6.61  )-----------( 141      ( 2021 07:20 )             33.8     02-17    129<L>  |  95<L>  |  19  ----------------------------<  202<H>  4.3   |  27  |  0.71    Ca    9.7      2021 07:20    TPro  8.4<H>  /  Alb  3.8  /  TBili  0.8  /  DBili  x   /  AST  60<H>  /  ALT  145<H>  /  AlkPhos  138<H>  02-16     LIVER FUNCTIONS - ( 2021 13:06 )  Alb: 3.8 g/dL / Pro: 8.4 gm/dL / ALK PHOS: 138 U/L / ALT: 145 U/L / AST: 60 U/L / GGT: x           Urinalysis Basic - ( 2021 17:59 )    Color: Yellow / Appearance: Clear / S.010 / pH: x  Gluc: x / Ketone: Negative  / Bili: Negative / Urobili: Negative mg/dL   Blood: x / Protein: 100 mg/dL / Nitrite: Negative   Leuk Esterase: Negative / RBC: 0-2 /HPF / WBC Negative   Sq Epi: x / Non Sq Epi: Negative / Bacteria: Occasional    Respiratory Viral Panel with COVID-19 by JYOTI (21 @ 13:01)    [facility]    SARS-CoV-2: NotDete: This Respiratory Panel uses polymerase chain reaction (PCR) to detect for  adenovirus; coronavirus (HKU1, NL63, 229E, OC43); human metapneumovirus  (hMPV); human enterovirus/rhinovirus (Entero/RV); influenza A; influenza  A/H1; influenza A/H3; influenza A/H1-2009; influenza B; parainfluenza  viruses 1, 2, 3, 4; respiratory syncytial virus; Mycoplasma pneumoniae;  Chlamydophila pneumoniae; and SARS-CoV-2.      < from: CT Abdomen and Pelvis w/ IV Cont (21 @ 15:40) >    EXAM:  CT ABDOMEN AND PELVIS IC                          EXAM:  CT ANGIO CHEST PE PROTOCOL IC                             PROCEDURE DATE:  2021          INTERPRETATION:  CLINICAL INFORMATION: Shortness of breath and hypoxic. Evaluate for pulmonary embolism.  Abdominal tenderness.    COMPARISON: None.    PROCEDURE:  CT Angiography of the Chest was performed followed by portal venous phase imaging of the Abdomen and Pelvis.  IV Contrast: 90 ml of Omnipaque 350 was injected intravenously. 10ml were discarded.  Oral contrast: None.  Sagittal and coronal reformats were performed as well as 3D (MIP) reconstructions.    FINDINGS:    CHEST:    LUNGS AND LARGE AIRWAYS: PLEURA:  There is diffuse bilateral interlobular septal thickening with patchy groundglass opacities and moderate-sized bilateral pleural effusions, findings likely reflecting interstitial pulmonary edema.  More confluent airspace consolidation involving the posterior segment right upper lobe, and right middle lobe could reflect superimposed infection/pneumonia.    There is bibasilar compressive atelectasis.    There are 2 adjacent nodules medially at the right lung apex measuring 8 mm and 7 mm respectively.    The central airways remain patent.    VESSELS: Evaluation of the subsegmental pulmonary arterial vasculature is somewhat limited particularly in the right middle and bilateral lower lobes; otherwise, there is no CT evidence for acute pulmonary embolism.  There is atherosclerotic calcification of the thoracic aorta with coronary artery calcification.    HEART: Cardiomegaly. Mitral calcification.   No pericardial effusion.    MEDIASTINUM AND KAY: Subcentimeter mediastinal lymph nodes. Possible calcified right hilar lymph node.  Enlarged right epiphrenic lymph nodes measuring up to 1.5 cm.    CHEST WALL AND LOWER NECK: Within normal limits.    ABDOMEN AND PELVIS:    LIVER: Within normal limits.  BILE DUCTS: Mild prominence of the common bile duct measuring up to 9 mm with 2 mm calcification within the distal common bile duct.  GALLBLADDER: Cholelithiasis.  SPLEEN: Within normal limits.  PANCREAS: Within normal limits.  ADRENALS: Within normal limits.  KIDNEYS/URETERS: Mild bilateral hydroureteronephrosis.  Left renal cyst.    BLADDER: Markedly distended urinary bladder, correlate with urine output.  REPRODUCTIVE ORGANS: Probable prior hysterectomy.  Surgical clips right adnexal.  Left adnexal calcification.    BOWEL: Distended, redundant sigmoid colon with fecal retention.  No bowel obstruction.   Appendix not visualized on this exam.  PERITONEUM: No ascites.  No localized intra-abdominal fluid collection or pneumoperitoneum noted.    VESSELS: Atherosclerotic calcification, abdominal aorta is normal in caliber.  RETROPERITONEUM/LYMPH NODES: Para-aortic and aortocaval lymph nodes, measuring up to 10 mm in short axis.  ABDOMINAL WALL: Umbilical hernia containing fat and small amount of nonobstructed small bowel.    BONES:  Degenerative changes spine.  Mild age indeterminate compression deformity superior endplate of L2.    IMPRESSION:    Limited evaluation of the subsegmental pulmonary vasculature as discussed otherwise, no acute pulmonary embolism noted.    Findings likely reflecting pulmonary edema.  More confluent airspace consolidation right upper and middle lobes, cannot exclude superimposed infection/pneumonia.    Cholelithiasis with suggestion of choledocholithiasis with small calcification distal CBD.    Mild bilateral hydroureteronephrosis likely secondary to marked distention of the urinary bladder.  Correlate for bladder outlet obstruction.    Fecal retention with distended redundant sigmoid colon.    Other findings as discussed above.    < end of copied text >            Radiology: all available radiological tests reviewed    Advanced directives addressed: full resuscitation Cerebral palsy Cough

## 2021-02-17 NOTE — CONSULT NOTE ADULT - SUBJECTIVE AND OBJECTIVE BOX
HPI:  69yo/F with PMH CVA about 8 years ago on Aspirin/statin, chronic hyponatremia, diabetes w/diabetic neuropathy presented for evaluation of worsening SOB. Patient unable to provide clear history, mainly obtained from daughter over the phone. Per daughter, she was OK yesterday, but woke up today SOB and they send her in to the hospital (2021 15:40)    69 yo female with PMH as above admitted for worsening SOB. Patient spoke in [place] and reports that she was voiding without any problems prior to this admission. She notes some discomfort with the catheter but denies any dysuria, hematuria, abd/flank pain prior to this. She admits to constipation and denies any fevers, chills, n/v. Reports she has never seen a urologist in the past.       PAST MEDICAL & SURGICAL HISTORY:  Diabetes mellitus    CVA (cerebral vascular accident)    No significant past surgical history        REVIEW OF SYSTEMS  All other review of systems neg, except as noted in HPI    MEDICATIONS  (STANDING):  aspirin enteric coated 81 milliGRAM(s) Oral daily  atorvastatin 20 milliGRAM(s) Oral at bedtime  azithromycin   Tablet 500 milliGRAM(s) Oral daily  bethanechol 25 milliGRAM(s) Oral every 8 hours  cefTRIAXone Injectable. 1000 milliGRAM(s) IV Push every 24 hours  cefTRIAXone Injectable.      dextrose 40% Gel 15 Gram(s) Oral once  dextrose 5%. 1000 milliLiter(s) (50 mL/Hr) IV Continuous <Continuous>  dextrose 5%. 1000 milliLiter(s) (100 mL/Hr) IV Continuous <Continuous>  dextrose 50% Injectable 25 Gram(s) IV Push once  dextrose 50% Injectable 12.5 Gram(s) IV Push once  dextrose 50% Injectable 25 Gram(s) IV Push once  enoxaparin Injectable 40 milliGRAM(s) SubCutaneous daily  gabapentin 300 milliGRAM(s) Oral three times a day  glucagon  Injectable 1 milliGRAM(s) IntraMuscular once  hydrALAZINE 10 milliGRAM(s) Oral every 8 hours  insulin glargine Injectable (LANTUS) 68 Unit(s) SubCutaneous at bedtime  insulin lispro (ADMELOG) corrective regimen sliding scale   SubCutaneous three times a day before meals  insulin lispro (ADMELOG) corrective regimen sliding scale   SubCutaneous at bedtime  losartan 100 milliGRAM(s) Oral daily  multivitamin 1 Tablet(s) Oral daily  pantoprazole    Tablet 40 milliGRAM(s) Oral daily  polyethylene glycol 3350 17 Gram(s) Oral every 12 hours  senna 2 Tablet(s) Oral at bedtime  sodium chloride 1 Gram(s) Oral two times a day  tamsulosin 0.4 milliGRAM(s) Oral at bedtime    MEDICATIONS  (PRN):  acetaminophen   Tablet .. 650 milliGRAM(s) Oral every 4 hours PRN Mild Pain (1 - 3)  aluminum hydroxide/magnesium hydroxide/simethicone Suspension 30 milliLiter(s) Oral every 4 hours PRN Dyspepsia  ondansetron Injectable 4 milliGRAM(s) IV Push every 6 hours PRN Nausea      Allergies    No Known Allergies    Intolerances        SOCIAL HISTORY:    FAMILY HISTORY:  FH: diabetes mellitus        Vital Signs Last 24 Hrs  T(C): 36.4 (2021 07:29), Max: 36.9 (2021 16:59)  T(F): 97.6 (2021 07:29), Max: 98.5 (2021 16:59)  HR: 71 (2021 07:29) (71 - 86)  BP: 122/40 (2021 07:29) (122/40 - 250/100)  BP(mean): 63 (2021 07:29) (63 - 116)  RR: 18 (2021 07:29) (18 - 23)  SpO2: 96% (2021 07:29) (86% - 99%)    PHYSICAL EXAM:  General: No distress, No anxiety  VITALS  T(C): 36.4 (21 @ 07:29), Max: 36.9 (21 @ 16:59)  HR: 71 (21 @ 07:29) (71 - 86)  BP: 122/40 (21 @ 07:29) (122/40 - 250/100)  RR: 18 (21 @ 07:29) (18 - 23)  SpO2: 96% (21 @ 07:29) (86% - 99%)            Skin     : No jaundice, No lesions, No swelling  HEENT: No icterus , EOM full , No epistaxis  Lung    : No resp distress  Abdo:   : Soft, Non tender, No guarding, No distension   Back    : No CVAT b/l  Extremity: No calf tenderness, No edema  Genitalia Female: Cummings in place draining yellow urine  Neuro   : A&Ox3        LABS:                        11.3   6.61  )-----------( 141      ( 2021 07:20 )             33.8     02    129<L>  |  95<L>  |  19  ----------------------------<  202<H>  4.3   |  27  |  0.71    Ca    9.7      2021 07:20    TPro  8.4<H>  /  Alb  3.8  /  TBili  0.8  /  DBili  x   /  AST  60<H>  /  ALT  145<H>  /  AlkPhos  138<H>      PT/INR - ( 2021 13:06 )   PT: 12.6 sec;   INR: 1.09 ratio         PTT - ( 2021 13:06 )  PTT:35.6 sec  Urinalysis Basic - ( 2021 17:59 )    Color: Yellow / Appearance: Clear / S.010 / pH: x  Gluc: x / Ketone: Negative  / Bili: Negative / Urobili: Negative mg/dL   Blood: x / Protein: 100 mg/dL / Nitrite: Negative   Leuk Esterase: Negative / RBC: 0-2 /HPF / WBC Negative   Sq Epi: x / Non Sq Epi: Negative / Bacteria: Occasional        RADIOLOGY & ADDITIONAL STUDIES:  < from: CT Abdomen and Pelvis w/ IV Cont (21 @ 15:40) >  EXAM:  CT ABDOMEN AND PELVIS IC                          EXAM:  CT ANGIO CHEST PE PROTOCOL IC                             PROCEDURE DATE:  2021          INTERPRETATION:  CLINICAL INFORMATION: Shortness of breath and hypoxic. Evaluate for pulmonary embolism.  Abdominal tenderness.    COMPARISON: None.    PROCEDURE:  CT Angiography of the Chest was performed followed by portal venous phase imaging of the Abdomen and Pelvis.  IV Contrast: 90 ml of Omnipaque 350 was injected intravenously. 10ml were discarded.  Oral contrast: None.  Sagittal and coronal reformats were performed as well as 3D (MIP) reconstructions.    FINDINGS:    CHEST:    LUNGS AND LARGE AIRWAYS: PLEURA:  There is diffuse bilateral interlobular septal thickening with patchy groundglass opacities and moderate-sized bilateral pleural effusions, findings likely reflecting interstitial pulmonary edema.  More confluent airspace consolidation involving the posterior segment right upper lobe, and right middle lobe could reflect superimposed infection/pneumonia.    There is bibasilar compressive atelectasis.    There are 2 adjacent nodules medially at the right lung apex measuring 8 mm and 7 mm respectively.    The central airways remain patent.    VESSELS: Evaluation of the subsegmental pulmonary arterial vasculature is somewhat limited particularly in the right middle and bilateral lower lobes; otherwise, there is no CT evidence for acute pulmonary embolism.  There is atherosclerotic calcification of the thoracic aorta with coronary artery calcification.    HEART: Cardiomegaly. Mitral calcification.   No pericardial effusion.    MEDIASTINUM AND KAY: Subcentimeter mediastinal lymph nodes. Possible calcified right hilar lymph node.  Enlarged right epiphrenic lymph nodes measuring up to 1.5 cm.    CHEST WALL AND LOWER NECK: Within normal limits.    ABDOMEN AND PELVIS:    LIVER: Within normal limits.  BILE DUCTS: Mild prominence of the common bile duct measuring up to 9 mm with 2 mm calcification within the distal common bile duct.  GALLBLADDER: Cholelithiasis.  SPLEEN: Within normal limits.  PANCREAS: Within normal limits.  ADRENALS: Within normal limits.  KIDNEYS/URETERS: Mild bilateral hydroureteronephrosis.  Left renal cyst.    BLADDER: Markedly distended urinary bladder, correlate with urine output.  REPRODUCTIVE ORGANS: Probable prior hysterectomy.  Surgical clips right adnexal.  Left adnexal calcification.    BOWEL: Distended, redundant sigmoid colon with fecal retention.  No bowel obstruction.   Appendix not visualized on this exam.  PERITONEUM: No ascites.  No localized intra-abdominal fluid collection or pneumoperitoneum noted.    VESSELS: Atherosclerotic calcification, abdominal aorta is normal in caliber.  RETROPERITONEUM/LYMPH NODES: Para-aortic and aortocaval lymph nodes, measuring up to 10 mm in short axis.  ABDOMINAL WALL: Umbilical hernia containing fat and small amount of nonobstructed small bowel.    BONES:  Degenerative changes spine.  Mild age indeterminate compression deformity superior endplate of L2.    IMPRESSION:    Limited evaluation of the subsegmental pulmonary vasculature as discussed otherwise, no acute pulmonary embolism noted.    Findings likely reflecting pulmonary edema.  More confluent airspace consolidation right upper and middle lobes, cannot exclude superimposed infection/pneumonia.    Cholelithiasis with suggestion of choledocholithiasis with small calcification distal CBD.    Mild bilateral hydroureteronephrosis likely secondary to marked distention of the urinary bladder.  Correlate for bladder outlet obstruction.    Fecal retention with distended redundant sigmoid colon.    Other findings as discussed above.              HERBER MOORE MD; Attending Radiologist  This document has been electronically signed. 2021  4:29PM    < end of copied text >

## 2021-02-17 NOTE — CONSULT NOTE ADULT - ASSESSMENT
71 yo female with PMH as above admitted for worsening SOB. Urology consulted for pt with urinary retention. CT scan with Mild bilateral hydroureteronephrosis likely secondary to marked distention of the urinary bladder. Correlate for bladder outlet obstruction.   Recommend  - F/U Urine c/s and treat accordingly  - Bethanechol 50 mg BID x 30 days  - Bowel regimen  - Keep bruce, d/c with bruce to leg bag and outpatient trial of void/urodynamics study    Case discussed with Dr. King

## 2021-02-17 NOTE — CONSULT NOTE ADULT - ASSESSMENT
#B/l pulmonary infiltrates- multifocal pneumonia  #Acute hypoxic resp failure  Admit to med-surg  Titrate O2 to keep sat >94%  High suspicion for COVID, but swab is negative  High D-dimer- high suspicion for PE- CTA chest done, report pending  Will cover with Rocephin+Zithro for CAP  ID eval fr furth IV abx  Pulm eval DR Jay MCCAULEY  Elevated lactate- doubt sepsis, WBC wnl and hemodynamics are stable    #H/o CVA  Cont Aspirin, statin    #Diabetes  Lantus +ISS  Hold oral hypoglycemic meds    #Hypercalcemia- on Ca supplement. Will stop and monitor    #Chr hyponatremia  ON NaCl tab at home- will increase to BID and monitor BMP    #HTN  Resume home meds  Adjust further if remains elevated    #COVID neg#B/l pulmonary infiltrates- multifocal pneumonia  #Acute hypoxic resp failure  Admit to med-surg  Titrate O2 to keep sat >94%  High suspicion for COVID, but swab is negative  High D-dimer- high suspicion for PE- CTA chest done, report pending  Will cover with Rocephin+Zithro for CAP  ID eval fr furth IV abx  Pulm eval DR Jay MCCAULEY  Elevated lactate- doubt sepsis, WBC wnl and hemodynamics are stable    #H/o CVA  Cont Aspirin, statin    #Diabetes  Lantus +ISS  Hold oral hypoglycemic meds    #Hypercalcemia- on Ca supplement. Will stop and monitor    #Chr hyponatremia  ON NaCl tab at home- will increase to BID and monitor BMP    #HTN  Resume home meds  Adjust further if remains elevated    #COVID neg PROBLEMS;    Acute hypoxic resp failure  B/l pulmonary infiltrates- multifocal pneumonia  H/o CVA  Diabetes  Hypercalcemia- on Ca supplement  Chr hyponatremia-ON NaCl tab at home  HTN  COVID neg    PLAN:    Titrate O2 to keep sat >94%  High suspicion for COVID-swab is negative  High D-dimer-CTA chest NEG  IV Rocephin+Zithro   ID eval   Albuterol MDI  Lantus +ISS  DVT PROPHYLASIX

## 2021-02-17 NOTE — CONSULT NOTE ADULT - ASSESSMENT
71yo/F with PMH CVA about 8 years ago on Aspirin/statin, chronic hyponatremia, diabetes w/diabetic neuropathy admitted on 2/16 for evaluation of shortness of breath of one day duration. The patient does note mid sternal pain with deep inspiration. The patient is poor historian and history per medical record.   1. Patient admitted with pneumonia which will treat as community acquired pneumonia  - follow up cultures   - serial cbc and monitor temperature   - oxygen and nebs as needed   - reviewed prior medical records to evaluate for resistant or atypical pathogens   - iv hydration and supportive care   - agree with ceftiraxone and zithromax as ordered  2. other issues: per medicine

## 2021-02-17 NOTE — ED ADULT NURSE REASSESSMENT NOTE - NS ED NURSE REASSESS COMMENT FT1
Pt with no complaints at this time, currently sleeping comfortably in bed.  Pt with NC 2L in place SP02 98%, currently in no distress.  Pt eating dinner, safety maintained

## 2021-02-17 NOTE — CONSULT NOTE ADULT - SUBJECTIVE AND OBJECTIVE BOX
`  HPI:  69yo/F with PMH CVA about 8 years ago on Aspirin/statin, chronic hyponatremia, diabetes w/diabetic neuropathy presented for evaluation of worsening SOB. Patient unable to provide clear history, mainly obtained from daughter over the phone. Per daughter, she was OK yesterday, but woke up today SOB and they send her in to the hospital (2021 15:40)      PAST MEDICAL & SURGICAL HISTORY:  Diabetes mellitus    CVA (cerebral vascular accident)    No significant past surgical history        Home Medications:  Aspirin Enteric Coated 81 mg oral delayed release tablet: 1 tab(s) orally once a day (2021 15:32)  atorvastatin 20 mg oral tablet: 1 tab(s) orally once a day (2021 15:32)  Caltrate 600 mg oral tablet: 1 tab(s) orally 2 times a day (2021 15:32)  cyanocobalamin 1000 mcg oral tablet: 2 tab(s) orally once a day (2021 15:32)  gabapentin 300 mg oral capsule: 1 cap(s) orally 3 times a day (2021 15:32)  Januvia 100 mg oral tablet: 1 tab(s) orally once a day (2021 15:32)  Levemir 100 units/mL subcutaneous solution: 68 unit(s) subcutaneously once a day (at bedtime) (2021 15:32)  losartan 100 mg oral tablet: 1 tab(s) orally once a day (2021 15:32)  metFORMIN 1000 mg oral tablet: 1 tab(s) orally 2 times a day (2021 15:32)  Multiple Vitamins oral tablet: 1 tab(s) orally once a day (2021 15:32)  pantoprazole 20 mg oral delayed release tablet: 1 tab(s) orally once a day (2021 15:32)  senna 8.6 mg oral tablet: 2 tab(s) orally once a day (at bedtime) (2021 15:32)  sodium chloride 1 g oral tablet: 1 tab(s) orally once a day (2021 15:38)  solifenacin 10 mg oral tablet: 1 tab(s) orally once a day (2021 15:32)  Vitamin B1 100 mg oral tablet: 1 tab(s) orally once a day (2021 15:32)  Vitamin B6 50 mg oral tablet: 1 tab(s) orally once a day (2021 15:32)      MEDICATIONS  (STANDING):  aspirin enteric coated 81 milliGRAM(s) Oral daily  atorvastatin 20 milliGRAM(s) Oral at bedtime  azithromycin   Tablet 500 milliGRAM(s) Oral daily  bethanechol 25 milliGRAM(s) Oral every 8 hours  cefTRIAXone Injectable. 1000 milliGRAM(s) IV Push every 24 hours  cefTRIAXone Injectable.      dextrose 40% Gel 15 Gram(s) Oral once  dextrose 5%. 1000 milliLiter(s) (50 mL/Hr) IV Continuous <Continuous>  dextrose 5%. 1000 milliLiter(s) (100 mL/Hr) IV Continuous <Continuous>  dextrose 50% Injectable 25 Gram(s) IV Push once  dextrose 50% Injectable 12.5 Gram(s) IV Push once  dextrose 50% Injectable 25 Gram(s) IV Push once  enoxaparin Injectable 40 milliGRAM(s) SubCutaneous daily  gabapentin 300 milliGRAM(s) Oral three times a day  glucagon  Injectable 1 milliGRAM(s) IntraMuscular once  hydrALAZINE 10 milliGRAM(s) Oral every 8 hours  insulin glargine Injectable (LANTUS) 68 Unit(s) SubCutaneous at bedtime  insulin lispro (ADMELOG) corrective regimen sliding scale   SubCutaneous three times a day before meals  insulin lispro (ADMELOG) corrective regimen sliding scale   SubCutaneous at bedtime  losartan 100 milliGRAM(s) Oral daily  multivitamin 1 Tablet(s) Oral daily  pantoprazole    Tablet 40 milliGRAM(s) Oral daily  polyethylene glycol 3350 17 Gram(s) Oral every 12 hours  senna 2 Tablet(s) Oral at bedtime  sodium chloride 1 Gram(s) Oral two times a day  tamsulosin 0.4 milliGRAM(s) Oral at bedtime    MEDICATIONS  (PRN):  acetaminophen   Tablet .. 650 milliGRAM(s) Oral every 4 hours PRN Mild Pain (1 - 3)  aluminum hydroxide/magnesium hydroxide/simethicone Suspension 30 milliLiter(s) Oral every 4 hours PRN Dyspepsia  ondansetron Injectable 4 milliGRAM(s) IV Push every 6 hours PRN Nausea      Allergies    No Known Allergies    Intolerances        SOCIAL HISTORY: Denies tobacco, etoh abuse or illicit drug use    FAMILY HISTORY:  FH: diabetes mellitus        Vital Signs Last 24 Hrs  T(C): 36.4 (2021 07:29), Max: 36.9 (2021 16:59)  T(F): 97.6 (2021 07:29), Max: 98.5 (2021 16:59)  HR: 71 (2021 07:29) (71 - 86)  BP: 122/40 (2021 07:29) (122/40 - 250/100)  BP(mean): 63 (2021 07:29) (63 - 116)  RR: 18 (2021 07:29) (18 - 23)  SpO2: 96% (:) (86% - 99%)        REVIEW OF SYSTEMS:    CONSTITUTIONAL:  As per HPI.  HEENT:  Eyes:  No diplopia or blurred vision. ENT:  No earache, sore throat or runny nose.  CARDIOVASCULAR:  No pressure, squeezing, tightness, heaviness or aching about the chest, neck, axilla or epigastrium.  RESPIRATORY:  No cough, shortness of breath, PND or orthopnea.  GASTROINTESTINAL:  No nausea, vomiting or diarrhea.  GENITOURINARY:  No dysuria, frequency or urgency.  MUSCULOSKELETAL:  As per HPI.  SKIN:  No change in skin, hair or nails.  NEUROLOGIC:  No paresthesias, fasciculations, seizures or weakness.  PSYCHIATRIC:  No disorder of thought or mood.  ENDOCRINE:  No heat or cold intolerance, polyuria or polydipsia.  HEMATOLOGICAL:  No easy bruising or bleedings:  .     PHYSICAL EXAMINATION:    GENERAL APPEARANCE:  Pt. is not currently dyspneic, in no distress. Pt. is alert, oriented, and pleasant.  HEENT:  Pupils are normal and react normally. No icterus. Mucous membranes well colored.  NECK:  Supple. No lymphadenopathy. Jugular venous pressure not elevated. Carotids equal.   HEART:   The cardiac impulse has a normal quality. Regular. Normal S1 and S2. There are no murmurs, rubs or gallops noted  CHEST:  Chest is clear to auscultation. Normal respiratory effort.  ABDOMEN:  Soft and nontender.   EXTREMITIES:  There is no cyanosis, clubbing or edema.   SKIN:  No rash or significant lesions are noted.    LABS:                        12.6   7.85  )-----------( 126      ( 2021 13:06 )             37.9     -16    129<L>  |  95<L>  |  16  ----------------------------<  164<H>  3.9   |  28  |  0.71    Ca    10.4<H>      2021 13:06    TPro  8.4<H>  /  Alb  3.8  /  TBili  0.8  /  DBili  x   /  AST  60<H>  /  ALT  145<H>  /  AlkPhos  138<H>  -    LIVER FUNCTIONS - ( 2021 13:06 )  Alb: 3.8 g/dL / Pro: 8.4 gm/dL / ALK PHOS: 138 U/L / ALT: 145 U/L / AST: 60 U/L / GGT: x           PT/INR - ( 2021 13:06 )   PT: 12.6 sec;   INR: 1.09 ratio         PTT - ( 2021 13:06 )  PTT:35.6 sec  CARDIAC MARKERS ( 2021 20:20 )  0.018 ng/mL / x     / x     / x     / x      CARDIAC MARKERS ( 2021 17:02 )  <0.015 ng/mL / x     / x     / x     / x      CARDIAC MARKERS ( 2021 13:06 )  <0.015 ng/mL / x     / x     / x     / x          Urinalysis Basic - ( 2021 17:59 )    Color: Yellow / Appearance: Clear / S.010 / pH: x  Gluc: x / Ketone: Negative  / Bili: Negative / Urobili: Negative mg/dL   Blood: x / Protein: 100 mg/dL / Nitrite: Negative   Leuk Esterase: Negative / RBC: 0-2 /HPF / WBC Negative   Sq Epi: x / Non Sq Epi: Negative / Bacteria: Occasional    SARS-CoV-2: NotDetec (2021 13:01)    RADIOLOGY & ADDITIONAL STUDIES:     < from: US Duplex Venous Lower Ext Complete, Bilateral (21 @ 17:37) >  IMPRESSION:  No evidence of deep venous thrombosis in either lower extremity.    < end of copied text >  < from: CT Angio Chest PE Protocol w/ IV Cont (21 @ 15:40) >  IMPRESSION:    Limited evaluation of the subsegmental pulmonary vasculature as discussed otherwise, no acute pulmonary embolism noted.    Findings likely reflecting pulmonary edema.  More confluent airspace consolidation right upper and middle lobes, cannot exclude superimposed infection/pneumonia.    Cholelithiasis with suggestion of choledocholithiasis with small calcification distal CBD.    Mild bilateral hydroureteronephrosis likely secondary to marked distention of the urinary bladder.  Correlate for bladder outlet obstruction.    Fecal retention with distended redundant sigmoid colon.    < end of copied text >     HPI:    70 y.o.f with PMH CVA about 8 years ago on Aspirin/statin, chronic hyponatremia, diabetes w/diabetic neuropathy presented for evaluation of worsening SOB. Patient as per her daughter, she woke up today SOB and they send her in to the hospital pat admitted with b/l pneumonia, pat seen for pulmonary eval.    PAST MEDICAL & SURGICAL HISTORY:  Diabetes mellitus    CVA (cerebral vascular accident)    No significant past surgical history        Home Medications:  Aspirin Enteric Coated 81 mg oral delayed release tablet: 1 tab(s) orally once a day (2021 15:32)  atorvastatin 20 mg oral tablet: 1 tab(s) orally once a day (2021 15:32)  Caltrate 600 mg oral tablet: 1 tab(s) orally 2 times a day (2021 15:32)  cyanocobalamin 1000 mcg oral tablet: 2 tab(s) orally once a day (2021 15:32)  gabapentin 300 mg oral capsule: 1 cap(s) orally 3 times a day (2021 15:32)  Januvia 100 mg oral tablet: 1 tab(s) orally once a day (2021 15:32)  Levemir 100 units/mL subcutaneous solution: 68 unit(s) subcutaneously once a day (at bedtime) (2021 15:32)  losartan 100 mg oral tablet: 1 tab(s) orally once a day (2021 15:32)  metFORMIN 1000 mg oral tablet: 1 tab(s) orally 2 times a day (2021 15:32)  Multiple Vitamins oral tablet: 1 tab(s) orally once a day (2021 15:32)  pantoprazole 20 mg oral delayed release tablet: 1 tab(s) orally once a day (2021 15:32)  senna 8.6 mg oral tablet: 2 tab(s) orally once a day (at bedtime) (2021 15:32)  sodium chloride 1 g oral tablet: 1 tab(s) orally once a day (2021 15:38)  solifenacin 10 mg oral tablet: 1 tab(s) orally once a day (2021 15:32)  Vitamin B1 100 mg oral tablet: 1 tab(s) orally once a day (2021 15:32)  Vitamin B6 50 mg oral tablet: 1 tab(s) orally once a day (2021 15:32)      MEDICATIONS  (STANDING):  aspirin enteric coated 81 milliGRAM(s) Oral daily  atorvastatin 20 milliGRAM(s) Oral at bedtime  azithromycin   Tablet 500 milliGRAM(s) Oral daily  bethanechol 25 milliGRAM(s) Oral every 8 hours  cefTRIAXone Injectable. 1000 milliGRAM(s) IV Push every 24 hours  cefTRIAXone Injectable.      dextrose 40% Gel 15 Gram(s) Oral once  dextrose 5%. 1000 milliLiter(s) (50 mL/Hr) IV Continuous <Continuous>  dextrose 5%. 1000 milliLiter(s) (100 mL/Hr) IV Continuous <Continuous>  dextrose 50% Injectable 25 Gram(s) IV Push once  dextrose 50% Injectable 12.5 Gram(s) IV Push once  dextrose 50% Injectable 25 Gram(s) IV Push once  enoxaparin Injectable 40 milliGRAM(s) SubCutaneous daily  gabapentin 300 milliGRAM(s) Oral three times a day  glucagon  Injectable 1 milliGRAM(s) IntraMuscular once  hydrALAZINE 10 milliGRAM(s) Oral every 8 hours  insulin glargine Injectable (LANTUS) 68 Unit(s) SubCutaneous at bedtime  insulin lispro (ADMELOG) corrective regimen sliding scale   SubCutaneous three times a day before meals  insulin lispro (ADMELOG) corrective regimen sliding scale   SubCutaneous at bedtime  losartan 100 milliGRAM(s) Oral daily  multivitamin 1 Tablet(s) Oral daily  pantoprazole    Tablet 40 milliGRAM(s) Oral daily  polyethylene glycol 3350 17 Gram(s) Oral every 12 hours  senna 2 Tablet(s) Oral at bedtime  sodium chloride 1 Gram(s) Oral two times a day  tamsulosin 0.4 milliGRAM(s) Oral at bedtime    MEDICATIONS  (PRN):  acetaminophen   Tablet .. 650 milliGRAM(s) Oral every 4 hours PRN Mild Pain (1 - 3)  aluminum hydroxide/magnesium hydroxide/simethicone Suspension 30 milliLiter(s) Oral every 4 hours PRN Dyspepsia  ondansetron Injectable 4 milliGRAM(s) IV Push every 6 hours PRN Nausea      Allergies    No Known Allergies    Intolerances        SOCIAL HISTORY: Denies tobacco, etoh abuse or illicit drug use    FAMILY HISTORY:  FH: diabetes mellitus        Vital Signs Last 24 Hrs  T(C): 36.4 (2021 07:29), Max: 36.9 (2021 16:59)  T(F): 97.6 (2021 07:29), Max: 98.5 (2021 16:59)  HR: 71 (2021 07:29) (71 - 86)  BP: 122/40 (2021 07:29) (122/40 - 250/100)  BP(mean): 63 (2021 07:29) (63 - 116)  RR: 18 (2021 07:29) (18 - 23)  SpO2: 96% (2021 07:29) (86% - 99%)        REVIEW OF SYSTEMS:    CONSTITUTIONAL:  As per HPI.  HEENT:  Eyes:  No diplopia or blurred vision. ENT:  No earache, sore throat or runny nose.  CARDIOVASCULAR:  No pressure, squeezing, tightness, heaviness or aching about the chest, neck, axilla or epigastrium.  RESPIRATORY:  No cough, shortness of breath, PND or orthopnea.  GASTROINTESTINAL:  No nausea, vomiting or diarrhea.  GENITOURINARY:  No dysuria, frequency or urgency.  MUSCULOSKELETAL:  As per HPI.  SKIN:  No change in skin, hair or nails.  NEUROLOGIC:  No paresthesias, fasciculations, seizures or weakness.  PSYCHIATRIC:  No disorder of thought or mood.  ENDOCRINE:  No heat or cold intolerance, polyuria or polydipsia.  HEMATOLOGICAL:  No easy bruising or bleedings:  .     PHYSICAL EXAMINATION:    GENERAL APPEARANCE:  Pt. is not currently dyspneic, in no distress. Pt. is alert, oriented, and pleasant.  HEENT:  Pupils are normal and react normally. No icterus. Mucous membranes well colored.  NECK:  Supple. No lymphadenopathy. Jugular venous pressure not elevated. Carotids equal.   HEART:   The cardiac impulse has a normal quality. Regular. Normal S1 and S2. There are no murmurs, rubs or gallops noted  CHEST:  Chest is clear to auscultation. Normal respiratory effort.  ABDOMEN:  Soft and nontender.   EXTREMITIES:  There is no cyanosis, clubbing or edema.   SKIN:  No rash or significant lesions are noted.    LABS:                        12.6   7.85  )-----------( 126      ( 2021 13:06 )             37.9     02-16    129<L>  |  95<L>  |  16  ----------------------------<  164<H>  3.9   |  28  |  0.71    Ca    10.4<H>      2021 13:06    TPro  8.4<H>  /  Alb  3.8  /  TBili  0.8  /  DBili  x   /  AST  60<H>  /  ALT  145<H>  /  AlkPhos  138<H>      LIVER FUNCTIONS - ( 2021 13:06 )  Alb: 3.8 g/dL / Pro: 8.4 gm/dL / ALK PHOS: 138 U/L / ALT: 145 U/L / AST: 60 U/L / GGT: x           PT/INR - ( 2021 13:06 )   PT: 12.6 sec;   INR: 1.09 ratio         PTT - ( 2021 13:06 )  PTT:35.6 sec  CARDIAC MARKERS ( 2021 20:20 )  0.018 ng/mL / x     / x     / x     / x      CARDIAC MARKERS ( 2021 17:02 )  <0.015 ng/mL / x     / x     / x     / x      CARDIAC MARKERS ( 2021 13:06 )  <0.015 ng/mL / x     / x     / x     / x          Urinalysis Basic - ( 2021 17:59 )    Color: Yellow / Appearance: Clear / S.010 / pH: x  Gluc: x / Ketone: Negative  / Bili: Negative / Urobili: Negative mg/dL   Blood: x / Protein: 100 mg/dL / Nitrite: Negative   Leuk Esterase: Negative / RBC: 0-2 /HPF / WBC Negative   Sq Epi: x / Non Sq Epi: Negative / Bacteria: Occasional    SARS-CoV-2: NotDetec (2021 13:01)    RADIOLOGY & ADDITIONAL STUDIES:     < from: US Duplex Venous Lower Ext Complete, Bilateral (21 @ 17:37) >  IMPRESSION:  No evidence of deep venous thrombosis in either lower extremity.    < end of copied text >  < from: CT Angio Chest PE Protocol w/ IV Cont (21 @ 15:40) >  IMPRESSION:    Limited evaluation of the subsegmental pulmonary vasculature as discussed otherwise, no acute pulmonary embolism noted.    Findings likely reflecting pulmonary edema.  More confluent airspace consolidation right upper and middle lobes, cannot exclude superimposed infection/pneumonia.    Cholelithiasis with suggestion of choledocholithiasis with small calcification distal CBD.    Mild bilateral hydroureteronephrosis likely secondary to marked distention of the urinary bladder.  Correlate for bladder outlet obstruction.    Fecal retention with distended redundant sigmoid colon.    < end of copied text >     HPI:    70 y.o.f with PMH CVA about 8 years ago on Aspirin/statin, chronic hyponatremia, diabetes w/diabetic neuropathy presented for evaluation of worsening SOB. Patient as per her daughter, she woke up today SOB and they send her in to the hospital pat admitted with b/l pneumonia, pat seen for pulmonary eval.    PAST MEDICAL & SURGICAL HISTORY:  Diabetes mellitus    CVA (cerebral vascular accident)    No significant past surgical history        Home Medications:  Aspirin Enteric Coated 81 mg oral delayed release tablet: 1 tab(s) orally once a day (2021 15:32)  atorvastatin 20 mg oral tablet: 1 tab(s) orally once a day (2021 15:32)  Caltrate 600 mg oral tablet: 1 tab(s) orally 2 times a day (2021 15:32)  cyanocobalamin 1000 mcg oral tablet: 2 tab(s) orally once a day (2021 15:32)  gabapentin 300 mg oral capsule: 1 cap(s) orally 3 times a day (2021 15:32)  Januvia 100 mg oral tablet: 1 tab(s) orally once a day (2021 15:32)  Levemir 100 units/mL subcutaneous solution: 68 unit(s) subcutaneously once a day (at bedtime) (2021 15:32)  losartan 100 mg oral tablet: 1 tab(s) orally once a day (2021 15:32)  metFORMIN 1000 mg oral tablet: 1 tab(s) orally 2 times a day (2021 15:32)  Multiple Vitamins oral tablet: 1 tab(s) orally once a day (2021 15:32)  pantoprazole 20 mg oral delayed release tablet: 1 tab(s) orally once a day (2021 15:32)  senna 8.6 mg oral tablet: 2 tab(s) orally once a day (at bedtime) (2021 15:32)  sodium chloride 1 g oral tablet: 1 tab(s) orally once a day (2021 15:38)  solifenacin 10 mg oral tablet: 1 tab(s) orally once a day (2021 15:32)  Vitamin B1 100 mg oral tablet: 1 tab(s) orally once a day (2021 15:32)  Vitamin B6 50 mg oral tablet: 1 tab(s) orally once a day (2021 15:32)      MEDICATIONS  (STANDING):  aspirin enteric coated 81 milliGRAM(s) Oral daily  atorvastatin 20 milliGRAM(s) Oral at bedtime  azithromycin   Tablet 500 milliGRAM(s) Oral daily  bethanechol 25 milliGRAM(s) Oral every 8 hours  cefTRIAXone Injectable. 1000 milliGRAM(s) IV Push every 24 hours  cefTRIAXone Injectable.      dextrose 40% Gel 15 Gram(s) Oral once  dextrose 5%. 1000 milliLiter(s) (50 mL/Hr) IV Continuous <Continuous>  dextrose 5%. 1000 milliLiter(s) (100 mL/Hr) IV Continuous <Continuous>  dextrose 50% Injectable 25 Gram(s) IV Push once  dextrose 50% Injectable 12.5 Gram(s) IV Push once  dextrose 50% Injectable 25 Gram(s) IV Push once  enoxaparin Injectable 40 milliGRAM(s) SubCutaneous daily  gabapentin 300 milliGRAM(s) Oral three times a day  glucagon  Injectable 1 milliGRAM(s) IntraMuscular once  hydrALAZINE 10 milliGRAM(s) Oral every 8 hours  insulin glargine Injectable (LANTUS) 68 Unit(s) SubCutaneous at bedtime  insulin lispro (ADMELOG) corrective regimen sliding scale   SubCutaneous three times a day before meals  insulin lispro (ADMELOG) corrective regimen sliding scale   SubCutaneous at bedtime  losartan 100 milliGRAM(s) Oral daily  multivitamin 1 Tablet(s) Oral daily  pantoprazole    Tablet 40 milliGRAM(s) Oral daily  polyethylene glycol 3350 17 Gram(s) Oral every 12 hours  senna 2 Tablet(s) Oral at bedtime  sodium chloride 1 Gram(s) Oral two times a day  tamsulosin 0.4 milliGRAM(s) Oral at bedtime    MEDICATIONS  (PRN):  acetaminophen   Tablet .. 650 milliGRAM(s) Oral every 4 hours PRN Mild Pain (1 - 3)  aluminum hydroxide/magnesium hydroxide/simethicone Suspension 30 milliLiter(s) Oral every 4 hours PRN Dyspepsia  ondansetron Injectable 4 milliGRAM(s) IV Push every 6 hours PRN Nausea      Allergies    No Known Allergies    Intolerances        SOCIAL HISTORY: Denies tobacco, etoh abuse or illicit drug use    FAMILY HISTORY:  FH: diabetes mellitus        Vital Signs Last 24 Hrs  T(C): 36.4 (2021 07:29), Max: 36.9 (2021 16:59)  T(F): 97.6 (2021 07:29), Max: 98.5 (2021 16:59)  HR: 71 (2021 07:29) (71 - 86)  BP: 122/40 (2021 07:29) (122/40 - 250/100)  BP(mean): 63 (2021 07:29) (63 - 116)  RR: 18 (2021 07:29) (18 - 23)  SpO2: 96% (2021 07:29) (86% - 99%)        REVIEW OF SYSTEMS:    CONSTITUTIONAL:  As per HPI.  HEENT:  Eyes:  No diplopia or blurred vision. ENT:  No earache, sore throat or runny nose.  CARDIOVASCULAR:  No pressure, squeezing, tightness, heaviness or aching about the chest, neck, axilla or epigastrium.  RESPIRATORY:  No cough, +shortness of breath, PND or orthopnea.  GASTROINTESTINAL:  No nausea, vomiting or diarrhea.  GENITOURINARY:  No dysuria, frequency or urgency.  MUSCULOSKELETAL:  As per HPI.  SKIN:  No change in skin, hair or nails.  NEUROLOGIC:  No paresthesias, fasciculations, seizures or weakness.  PSYCHIATRIC:  No disorder of thought or mood.  ENDOCRINE:  No heat or cold intolerance, polyuria or polydipsia.  HEMATOLOGICAL:  No easy bruising or bleedings:  .     PHYSICAL EXAMINATION:    GENERAL APPEARANCE:  Pt. is not currently dyspneic, in no distress. Pt. is alert, oriented, and pleasant.  HEENT:  Pupils are normal and react normally. No icterus. Mucous membranes well colored.  NECK:  Supple. No lymphadenopathy. Jugular venous pressure not elevated. Carotids equal.   HEART:   The cardiac impulse has a normal quality. Regular. Normal S1 and S2. There are no murmurs, rubs or gallops noted  CHEST:  Chest crackles to auscultation. Normal respiratory effort.  ABDOMEN:  Soft and nontender.   EXTREMITIES:  There is no cyanosis, clubbing or edema.   SKIN:  No rash or significant lesions are noted.    LABS:                        12.6   7.85  )-----------( 126      ( 2021 13:06 )             37.9     02-16    129<L>  |  95<L>  |  16  ----------------------------<  164<H>  3.9   |  28  |  0.71    Ca    10.4<H>      2021 13:06    TPro  8.4<H>  /  Alb  3.8  /  TBili  0.8  /  DBili  x   /  AST  60<H>  /  ALT  145<H>  /  AlkPhos  138<H>      LIVER FUNCTIONS - ( 2021 13:06 )  Alb: 3.8 g/dL / Pro: 8.4 gm/dL / ALK PHOS: 138 U/L / ALT: 145 U/L / AST: 60 U/L / GGT: x           PT/INR - ( 2021 13:06 )   PT: 12.6 sec;   INR: 1.09 ratio         PTT - ( 2021 13:06 )  PTT:35.6 sec  CARDIAC MARKERS ( 2021 20:20 )  0.018 ng/mL / x     / x     / x     / x      CARDIAC MARKERS ( 2021 17:02 )  <0.015 ng/mL / x     / x     / x     / x      CARDIAC MARKERS ( 2021 13:06 )  <0.015 ng/mL / x     / x     / x     / x          Urinalysis Basic - ( 2021 17:59 )    Color: Yellow / Appearance: Clear / S.010 / pH: x  Gluc: x / Ketone: Negative  / Bili: Negative / Urobili: Negative mg/dL   Blood: x / Protein: 100 mg/dL / Nitrite: Negative   Leuk Esterase: Negative / RBC: 0-2 /HPF / WBC Negative   Sq Epi: x / Non Sq Epi: Negative / Bacteria: Occasional    SARS-CoV-2: NotDetec (2021 13:01)    RADIOLOGY & ADDITIONAL STUDIES:     US Duplex Venous Lower Ext Complete, Bilateral (21 @ 17:37) >  IMPRESSION:  No evidence of deep venous thrombosis in either lower extremity.    CT Angio Chest PE Protocol w/ IV Cont (21 @ 15:40) >  IMPRESSION:    Limited evaluation of the subsegmental pulmonary vasculature as discussed otherwise, no acute pulmonary embolism noted.    Findings likely reflecting pulmonary edema.  More confluent airspace consolidation right upper and middle lobes, cannot exclude superimposed infection/pneumonia.    Cholelithiasis with suggestion of choledocholithiasis with small calcification distal CBD.    Mild bilateral hydroureteronephrosis likely secondary to marked distention of the urinary bladder.  Correlate for bladder outlet obstruction.    Fecal retention with distended redundant sigmoid colon.

## 2021-02-18 LAB
ANION GAP SERPL CALC-SCNC: 5 MMOL/L — SIGNIFICANT CHANGE UP (ref 5–17)
BUN SERPL-MCNC: 23 MG/DL — SIGNIFICANT CHANGE UP (ref 7–23)
CALCIUM SERPL-MCNC: 9.1 MG/DL — SIGNIFICANT CHANGE UP (ref 8.5–10.1)
CHLORIDE SERPL-SCNC: 96 MMOL/L — SIGNIFICANT CHANGE UP (ref 96–108)
CO2 SERPL-SCNC: 29 MMOL/L — SIGNIFICANT CHANGE UP (ref 22–31)
CREAT SERPL-MCNC: 0.78 MG/DL — SIGNIFICANT CHANGE UP (ref 0.5–1.3)
GLUCOSE SERPL-MCNC: 115 MG/DL — HIGH (ref 70–99)
POTASSIUM SERPL-MCNC: 4 MMOL/L — SIGNIFICANT CHANGE UP (ref 3.5–5.3)
POTASSIUM SERPL-SCNC: 4 MMOL/L — SIGNIFICANT CHANGE UP (ref 3.5–5.3)
SODIUM SERPL-SCNC: 130 MMOL/L — LOW (ref 135–145)

## 2021-02-18 PROCEDURE — 99232 SBSQ HOSP IP/OBS MODERATE 35: CPT

## 2021-02-18 PROCEDURE — 99223 1ST HOSP IP/OBS HIGH 75: CPT

## 2021-02-18 RX ORDER — INSULIN GLARGINE 100 [IU]/ML
34 INJECTION, SOLUTION SUBCUTANEOUS ONCE
Refills: 0 | Status: COMPLETED | OUTPATIENT
Start: 2021-02-18 | End: 2021-02-18

## 2021-02-18 RX ADMIN — CEFTRIAXONE 1000 MILLIGRAM(S): 500 INJECTION, POWDER, FOR SOLUTION INTRAMUSCULAR; INTRAVENOUS at 13:58

## 2021-02-18 RX ADMIN — Medication 4: at 17:31

## 2021-02-18 RX ADMIN — SODIUM CHLORIDE 1 GRAM(S): 9 INJECTION INTRAMUSCULAR; INTRAVENOUS; SUBCUTANEOUS at 10:14

## 2021-02-18 RX ADMIN — SENNA PLUS 2 TABLET(S): 8.6 TABLET ORAL at 22:03

## 2021-02-18 RX ADMIN — GABAPENTIN 300 MILLIGRAM(S): 400 CAPSULE ORAL at 22:03

## 2021-02-18 RX ADMIN — TAMSULOSIN HYDROCHLORIDE 0.4 MILLIGRAM(S): 0.4 CAPSULE ORAL at 22:03

## 2021-02-18 RX ADMIN — Medication 50 MILLIGRAM(S): at 10:14

## 2021-02-18 RX ADMIN — Medication 10 MILLIGRAM(S): at 22:03

## 2021-02-18 RX ADMIN — INSULIN GLARGINE 34 UNIT(S): 100 INJECTION, SOLUTION SUBCUTANEOUS at 22:03

## 2021-02-18 RX ADMIN — Medication 2: at 12:20

## 2021-02-18 RX ADMIN — Medication 1 TABLET(S): at 10:14

## 2021-02-18 RX ADMIN — LOSARTAN POTASSIUM 100 MILLIGRAM(S): 100 TABLET, FILM COATED ORAL at 10:14

## 2021-02-18 RX ADMIN — Medication 10 MILLIGRAM(S): at 13:58

## 2021-02-18 RX ADMIN — GABAPENTIN 300 MILLIGRAM(S): 400 CAPSULE ORAL at 13:58

## 2021-02-18 RX ADMIN — AZITHROMYCIN 500 MILLIGRAM(S): 500 TABLET, FILM COATED ORAL at 10:14

## 2021-02-18 RX ADMIN — Medication 81 MILLIGRAM(S): at 10:14

## 2021-02-18 RX ADMIN — POLYETHYLENE GLYCOL 3350 17 GRAM(S): 17 POWDER, FOR SOLUTION ORAL at 10:14

## 2021-02-18 RX ADMIN — PANTOPRAZOLE SODIUM 40 MILLIGRAM(S): 20 TABLET, DELAYED RELEASE ORAL at 10:14

## 2021-02-18 RX ADMIN — GABAPENTIN 300 MILLIGRAM(S): 400 CAPSULE ORAL at 06:07

## 2021-02-18 RX ADMIN — ENOXAPARIN SODIUM 40 MILLIGRAM(S): 100 INJECTION SUBCUTANEOUS at 10:14

## 2021-02-18 RX ADMIN — SODIUM CHLORIDE 1 GRAM(S): 9 INJECTION INTRAMUSCULAR; INTRAVENOUS; SUBCUTANEOUS at 22:03

## 2021-02-18 RX ADMIN — Medication 50 MILLIGRAM(S): at 22:03

## 2021-02-18 RX ADMIN — ATORVASTATIN CALCIUM 20 MILLIGRAM(S): 80 TABLET, FILM COATED ORAL at 22:03

## 2021-02-18 RX ADMIN — POLYETHYLENE GLYCOL 3350 17 GRAM(S): 17 POWDER, FOR SOLUTION ORAL at 22:03

## 2021-02-18 NOTE — CONSULT NOTE ADULT - SUBJECTIVE AND OBJECTIVE BOX
Patient is a 70y old  Female who presents with a chief complaint of Worsening SOB (18 Feb 2021 19:44)    70 year old woman with CVA, DM, admitted with multifocal pneumonia and presumed covid.     Covid so not interviewed. Per hospitalist patient not complaining of any abodminal pain, nausea, or vomiting. No hematocehzia, no hematemesis, no hematochezia.     PAST MEDICAL & SURGICAL HISTORY:  Diabetes mellitus    CVA (cerebral vascular accident)    No significant past surgical history        MEDICATIONS  (STANDING):  aspirin enteric coated 81 milliGRAM(s) Oral daily  atorvastatin 20 milliGRAM(s) Oral at bedtime  azithromycin   Tablet 500 milliGRAM(s) Oral daily  bethanechol 50 milliGRAM(s) Oral two times a day  cefTRIAXone Injectable. 1000 milliGRAM(s) IV Push every 24 hours  cefTRIAXone Injectable.      dextrose 40% Gel 15 Gram(s) Oral once  dextrose 5%. 1000 milliLiter(s) (50 mL/Hr) IV Continuous <Continuous>  dextrose 5%. 1000 milliLiter(s) (100 mL/Hr) IV Continuous <Continuous>  dextrose 50% Injectable 25 Gram(s) IV Push once  dextrose 50% Injectable 12.5 Gram(s) IV Push once  dextrose 50% Injectable 25 Gram(s) IV Push once  enoxaparin Injectable 40 milliGRAM(s) SubCutaneous daily  gabapentin 300 milliGRAM(s) Oral three times a day  glucagon  Injectable 1 milliGRAM(s) IntraMuscular once  hydrALAZINE 10 milliGRAM(s) Oral every 8 hours  insulin glargine Injectable (LANTUS) 68 Unit(s) SubCutaneous at bedtime  insulin lispro (ADMELOG) corrective regimen sliding scale   SubCutaneous three times a day before meals  insulin lispro (ADMELOG) corrective regimen sliding scale   SubCutaneous at bedtime  losartan 100 milliGRAM(s) Oral daily  multivitamin 1 Tablet(s) Oral daily  pantoprazole    Tablet 40 milliGRAM(s) Oral daily  polyethylene glycol 3350 17 Gram(s) Oral every 12 hours  senna 2 Tablet(s) Oral at bedtime  sodium chloride 1 Gram(s) Oral two times a day  tamsulosin 0.4 milliGRAM(s) Oral at bedtime    MEDICATIONS  (PRN):  acetaminophen   Tablet .. 650 milliGRAM(s) Oral every 4 hours PRN Mild Pain (1 - 3)  aluminum hydroxide/magnesium hydroxide/simethicone Suspension 30 milliLiter(s) Oral every 4 hours PRN Dyspepsia  ondansetron Injectable 4 milliGRAM(s) IV Push every 6 hours PRN Nausea      Allergies    No Known Allergies    Intolerances        SOCIAL HISTORY:no smoking, drinking or drugs    FAMILY HISTORY:  FH: diabetes mellitus        REVIEW OF SYSTEMS:not reviewd    Vital Signs Last 24 Hrs  T(C): 37.1 (18 Feb 2021 21:46), Max: 37.1 (18 Feb 2021 21:46)  T(F): 98.8 (18 Feb 2021 21:46), Max: 98.8 (18 Feb 2021 21:46)  HR: 73 (18 Feb 2021 21:46) (68 - 94)  BP: 150/57 (18 Feb 2021 21:46) (108/41 - 152/49)  BP(mean): --  RR: 18 (18 Feb 2021 21:46) (18 - 19)  SpO2: 100% (18 Feb 2021 21:46) (96% - 100%)    PHYSICAL EXAM:  As not to be wasteful of PPE, and to protect myself and others, exam not perfomred.     LABS:                        11.3   6.61  )-----------( 141      ( 17 Feb 2021 07:20 )             33.8     02-18    130<L>  |  96  |  23  ----------------------------<  115<H>  4.0   |  29  |  0.78    Ca    9.1      18 Feb 2021 07:19            RADIOLOGY & ADDITIONAL STUDIES: CT Ch and Abd reveiwed in detail

## 2021-02-18 NOTE — PROGRESS NOTE ADULT - ASSESSMENT
PROBLEMS;    Acute hypoxic resp failure  B/l pulmonary infiltrates- multifocal pneumonia- psoitive covid abx  UTI  H/o CVA  Diabetes  Hypercalcemia- on Ca supplement  Chr hyponatremia-ON NaCl tab at home  HTN      PLAN:    pulmonary better  Titrate O2 to keep sat >94%  High suspicion for COVID-swab is negative  High D-dimer-CTA chest NEG  IV Rocephin+Zithro   ID eval   Albuterol MDI  Lantus +ISS  DVT PROPHYLASIX

## 2021-02-18 NOTE — PROGRESS NOTE ADULT - SUBJECTIVE AND OBJECTIVE BOX
Subjective:    pat lying in bed, better, no new complaint.    Home Medications:  Aspirin Enteric Coated 81 mg oral delayed release tablet: 1 tab(s) orally once a day (16 Feb 2021 15:32)  atorvastatin 20 mg oral tablet: 1 tab(s) orally once a day (16 Feb 2021 15:32)  Caltrate 600 mg oral tablet: 1 tab(s) orally 2 times a day (16 Feb 2021 15:32)  cyanocobalamin 1000 mcg oral tablet: 2 tab(s) orally once a day (16 Feb 2021 15:32)  gabapentin 300 mg oral capsule: 1 cap(s) orally 3 times a day (16 Feb 2021 15:32)  Januvia 100 mg oral tablet: 1 tab(s) orally once a day (16 Feb 2021 15:32)  Levemir 100 units/mL subcutaneous solution: 68 unit(s) subcutaneously once a day (at bedtime) (16 Feb 2021 15:32)  losartan 100 mg oral tablet: 1 tab(s) orally once a day (16 Feb 2021 15:32)  metFORMIN 1000 mg oral tablet: 1 tab(s) orally 2 times a day (16 Feb 2021 15:32)  Multiple Vitamins oral tablet: 1 tab(s) orally once a day (16 Feb 2021 15:32)  pantoprazole 20 mg oral delayed release tablet: 1 tab(s) orally once a day (16 Feb 2021 15:32)  senna 8.6 mg oral tablet: 2 tab(s) orally once a day (at bedtime) (16 Feb 2021 15:32)  sodium chloride 1 g oral tablet: 1 tab(s) orally once a day (16 Feb 2021 15:38)  solifenacin 10 mg oral tablet: 1 tab(s) orally once a day (16 Feb 2021 15:32)  Vitamin B1 100 mg oral tablet: 1 tab(s) orally once a day (16 Feb 2021 15:32)  Vitamin B6 50 mg oral tablet: 1 tab(s) orally once a day (16 Feb 2021 15:32)    MEDICATIONS  (STANDING):  aspirin enteric coated 81 milliGRAM(s) Oral daily  atorvastatin 20 milliGRAM(s) Oral at bedtime  azithromycin   Tablet 500 milliGRAM(s) Oral daily  bethanechol 50 milliGRAM(s) Oral two times a day  cefTRIAXone Injectable. 1000 milliGRAM(s) IV Push every 24 hours  cefTRIAXone Injectable.      dextrose 40% Gel 15 Gram(s) Oral once  dextrose 5%. 1000 milliLiter(s) (50 mL/Hr) IV Continuous <Continuous>  dextrose 5%. 1000 milliLiter(s) (100 mL/Hr) IV Continuous <Continuous>  dextrose 50% Injectable 25 Gram(s) IV Push once  dextrose 50% Injectable 12.5 Gram(s) IV Push once  dextrose 50% Injectable 25 Gram(s) IV Push once  enoxaparin Injectable 40 milliGRAM(s) SubCutaneous daily  gabapentin 300 milliGRAM(s) Oral three times a day  glucagon  Injectable 1 milliGRAM(s) IntraMuscular once  hydrALAZINE 10 milliGRAM(s) Oral every 8 hours  insulin glargine Injectable (LANTUS) 68 Unit(s) SubCutaneous at bedtime  insulin lispro (ADMELOG) corrective regimen sliding scale   SubCutaneous three times a day before meals  insulin lispro (ADMELOG) corrective regimen sliding scale   SubCutaneous at bedtime  losartan 100 milliGRAM(s) Oral daily  multivitamin 1 Tablet(s) Oral daily  pantoprazole    Tablet 40 milliGRAM(s) Oral daily  polyethylene glycol 3350 17 Gram(s) Oral every 12 hours  senna 2 Tablet(s) Oral at bedtime  sodium chloride 1 Gram(s) Oral two times a day  tamsulosin 0.4 milliGRAM(s) Oral at bedtime    MEDICATIONS  (PRN):  acetaminophen   Tablet .. 650 milliGRAM(s) Oral every 4 hours PRN Mild Pain (1 - 3)  aluminum hydroxide/magnesium hydroxide/simethicone Suspension 30 milliLiter(s) Oral every 4 hours PRN Dyspepsia  ondansetron Injectable 4 milliGRAM(s) IV Push every 6 hours PRN Nausea      Allergies    No Known Allergies    Intolerances        Vital Signs Last 24 Hrs  T(C): 36.7 (18 Feb 2021 17:47), Max: 37 (17 Feb 2021 21:30)  T(F): 98 (18 Feb 2021 17:47), Max: 98.6 (17 Feb 2021 21:30)  HR: 94 (18 Feb 2021 17:47) (68 - 94)  BP: 129/77 (18 Feb 2021 17:47) (108/41 - 152/49)  BP(mean): --  RR: 18 (18 Feb 2021 17:47) (18 - 20)  SpO2: 98% (18 Feb 2021 17:47) (96% - 100%)      PHYSICAL EXAMINATION:    NECK:  Supple. No lymphadenopathy. Jugular venous pressure not elevated. Carotids equal.   HEART:   The cardiac impulse has a normal quality. Reg., Nl S1 and S2.  There are no murmurs, rubs or gallops noted  CHEST:  Chest icrackles to auscultation. Normal respiratory effort.  ABDOMEN:  Soft and nontender.   EXTREMITIES:  There is no edema.       LABS:                        11.3   6.61  )-----------( 141      ( 17 Feb 2021 07:20 )             33.8     02-18    130<L>  |  96  |  23  ----------------------------<  115<H>  4.0   |  29  |  0.78    Ca    9.1      18 Feb 2021 07:19      Covid abx positive

## 2021-02-18 NOTE — PROGRESS NOTE ADULT - ASSESSMENT
71yo/F with PMH CVA about 8 years ago on Aspirin/statin, chronic hyponatremia, diabetes w/diabetic neuropathy admitted on 2/16 for evaluation of shortness of breath of one day duration. The patient does note mid sternal pain with deep inspiration. The patient is poor historian and history per medical record.   1. Patient admitted with pneumonia which will treat as community acquired pneumonia  - follow up cultures   - serial cbc and monitor temperature   - oxygen and nebs as needed   - reviewed prior medical records to evaluate for resistant or atypical pathogens   - iv hydration and supportive care   - agree with ceftiraxone and zithromax as ordered, day #2  - tolerating antibiotics without rashes or side effects   - ceftriaxone will cover urinary tract infection as well  2. other issues: per medicine

## 2021-02-18 NOTE — CONSULT NOTE ADULT - ASSESSMENT
70 year old woman with CVA, DM, admitted with multifocal pneumonia and presumed covid.     Asymptomatic choledocholithiasis can be managed non-emergently. An ERCP would require general anesthesia and the last thing we would want to do is unecessarily intubate this patient with multifocal pneumonia and covid, mortaility rates very high in this situation. Can evaluate her as outpaiteitn after she gets discharged and plan potential case as outpatient.

## 2021-02-18 NOTE — PROGRESS NOTE ADULT - SUBJECTIVE AND OBJECTIVE BOX
71yo/F with PMH CVA about 8 years ago on Aspirin/statin, chronic hyponatremia, diabetes w/diabetic neuropathy presented for evaluation of worsening SOB. Patient unable to provide clear history, mainly obtained from daughter over the phone. Per daughter, she was OK yesterday, but woke up today SOB and they send her in to the hospital.        21: Patient seen and examined. Still with sob. Discussed with patient regarding management and d/c plan.   Discussed with her daughter and son in law regarding management plan.   21: Patient seen and examined. Feels better today. Discussed with daughter      Vital Signs Last 24 Hrs  T(C): 36.8 (2021 09:31), Max: 37 (2021 11:19)  T(F): 98.3 (2021 09:31), Max: 98.6 (2021 11:19)  HR: 68 (2021 09:31) (68 - 82)  BP: 131/43 (2021 09:31) (108/41 - 159/50)  BP(mean): 54 (2021 16:19) (54 - 66)  RR: 19 (2021 09:31) (18 - 20)  SpO2: 100% (2021 09:31) (96% - 100%)      Physical Exam:  · Constitutional	Well-developed, well nourished  · Neck	No bruits; no thyromegaly or nodules  · Back	No deformity or limitation of movement  · Respiratory	detailed exam  · Respiratory Comments	coarse rhonchi  · Cardiovascular	Regular rate & rhythm, normal S1, S2; no murmurs, gallops or rubs; no S3, S4  · Gastrointestinal	Soft, non-tender, no hepatosplenomegaly, normal bowel sounds  · Genitourinary	Normal genitalia; no lesions; no discharge  · Extremities	No cyanosis, clubbing or edema  · Neurological	Alert & oriented; no sensory, motor or coordination deficits, normal reflexes                      · Skin	No lesions; no rash  · Musculoskeletal	No joint pain, swelling or deformity; no limitation of movement                                   11.3   6.61  )-----------( 141      ( 2021 07:20 )             33.8     2021 07:19    130    |  96     |  23     ----------------------------<  115    4.0     |  29     |  0.78     Ca    9.1        2021 07:19    TPro  8.4    /  Alb  3.8    /  TBili  0.8    /  DBili  x      /  AST  60     /  ALT  145    /  AlkPhos  138    2021 13:06    LIVER FUNCTIONS - ( 2021 13:06 )  Alb: 3.8 g/dL / Pro: 8.4 gm/dL / ALK PHOS: 138 U/L / ALT: 145 U/L / AST: 60 U/L / GGT: x           PT/INR - ( 2021 13:06 )   PT: 12.6 sec;   INR: 1.09 ratio         PTT - ( 2021 13:06 )  PTT:35.6 sec  CAPILLARY BLOOD GLUCOSE      POCT Blood Glucose.: 122 mg/dL (2021 08:11)  POCT Blood Glucose.: 254 mg/dL (2021 21:00)  POCT Blood Glucose.: 258 mg/dL (2021 16:15)  POCT Blood Glucose.: 243 mg/dL (2021 11:16)    CARDIAC MARKERS ( 2021 20:20 )  0.018 ng/mL / x     / x     / x     / x      CARDIAC MARKERS ( 2021 17:02 )  <0.015 ng/mL / x     / x     / x     / x      CARDIAC MARKERS ( 2021 13:06 )  <0.015 ng/mL / x     / x     / x     / x          Urinalysis Basic - ( 2021 17:59 )    Color: Yellow / Appearance: Clear / S.010 / pH: x  Gluc: x / Ketone: Negative  / Bili: Negative / Urobili: Negative mg/dL   Blood: x / Protein: 100 mg/dL / Nitrite: Negative   Leuk Esterase: Negative / RBC: 0-2 /HPF / WBC Negative   Sq Epi: x / Non Sq Epi: Negative / Bacteria: Occasional            MEDICATIONS  (STANDING):  aspirin enteric coated 81 milliGRAM(s) Oral daily  atorvastatin 20 milliGRAM(s) Oral at bedtime  azithromycin   Tablet 500 milliGRAM(s) Oral daily  bethanechol 50 milliGRAM(s) Oral two times a day  cefTRIAXone Injectable. 1000 milliGRAM(s) IV Push every 24 hours  cefTRIAXone Injectable.      dextrose 40% Gel 15 Gram(s) Oral once  dextrose 5%. 1000 milliLiter(s) (50 mL/Hr) IV Continuous <Continuous>  dextrose 5%. 1000 milliLiter(s) (100 mL/Hr) IV Continuous <Continuous>  dextrose 50% Injectable 25 Gram(s) IV Push once  dextrose 50% Injectable 12.5 Gram(s) IV Push once  dextrose 50% Injectable 25 Gram(s) IV Push once  enoxaparin Injectable 40 milliGRAM(s) SubCutaneous daily  gabapentin 300 milliGRAM(s) Oral three times a day  glucagon  Injectable 1 milliGRAM(s) IntraMuscular once  hydrALAZINE 10 milliGRAM(s) Oral every 8 hours  insulin glargine Injectable (LANTUS) 68 Unit(s) SubCutaneous at bedtime  insulin lispro (ADMELOG) corrective regimen sliding scale   SubCutaneous three times a day before meals  insulin lispro (ADMELOG) corrective regimen sliding scale   SubCutaneous at bedtime  losartan 100 milliGRAM(s) Oral daily  multivitamin 1 Tablet(s) Oral daily  pantoprazole    Tablet 40 milliGRAM(s) Oral daily  polyethylene glycol 3350 17 Gram(s) Oral every 12 hours  senna 2 Tablet(s) Oral at bedtime  sodium chloride 1 Gram(s) Oral two times a day  tamsulosin 0.4 milliGRAM(s) Oral at bedtime    MEDICATIONS  (PRN):  acetaminophen   Tablet .. 650 milliGRAM(s) Oral every 4 hours PRN Mild Pain (1 - 3)  aluminum hydroxide/magnesium hydroxide/simethicone Suspension 30 milliLiter(s) Oral every 4 hours PRN Dyspepsia  ondansetron Injectable 4 milliGRAM(s) IV Push every 6 hours PRN Nausea            Assessment and Plan:   Assessment:  · Assessment	  #B/l pulmonary infiltrates- multifocal pneumonia  #Acute hypoxic resp failure  Titrate O2 to keep sat >94%  High suspicion for COVID, but swab is negative  Antibody positive  No PE  Continue IV Rocephin+Zithro for CAP  ID eval appreciated  Pulm eval DR Thompson appreciated  Albuterol MDI  Elevated lactate- doubt sepsis, WBC wnl and hemodynamics are stable    Choledocholithiasis-asymptomatic  Discussed with Dr Lewis-follow up with him as an outpatient    #H/o CVA  Cont Aspirin, statin    #Diabetes  Lantus +ISS  Hold oral hypoglycemic meds    #Hypercalcemia- resolved    #Chr hyponatremia  ON NaCl tab at home- will increase to BID and monitor BMP    #HTN  Resume home meds  Adjust further if remains elevated    #COVID neg    #DVT proph- Lovenox

## 2021-02-18 NOTE — PROGRESS NOTE ADULT - SUBJECTIVE AND OBJECTIVE BOX
Date of service: 02-18-21 @ 15:00      Patient lying in bed; afebrile, complains of urinary pain      ROS unable to obtain secondary to patient medical condition     MEDICATIONS  (STANDING):  aspirin enteric coated 81 milliGRAM(s) Oral daily  atorvastatin 20 milliGRAM(s) Oral at bedtime  azithromycin   Tablet 500 milliGRAM(s) Oral daily  bethanechol 50 milliGRAM(s) Oral two times a day  cefTRIAXone Injectable. 1000 milliGRAM(s) IV Push every 24 hours  cefTRIAXone Injectable.      dextrose 40% Gel 15 Gram(s) Oral once  dextrose 5%. 1000 milliLiter(s) (50 mL/Hr) IV Continuous <Continuous>  dextrose 5%. 1000 milliLiter(s) (100 mL/Hr) IV Continuous <Continuous>  dextrose 50% Injectable 25 Gram(s) IV Push once  dextrose 50% Injectable 12.5 Gram(s) IV Push once  dextrose 50% Injectable 25 Gram(s) IV Push once  enoxaparin Injectable 40 milliGRAM(s) SubCutaneous daily  gabapentin 300 milliGRAM(s) Oral three times a day  glucagon  Injectable 1 milliGRAM(s) IntraMuscular once  hydrALAZINE 10 milliGRAM(s) Oral every 8 hours  insulin glargine Injectable (LANTUS) 68 Unit(s) SubCutaneous at bedtime  insulin lispro (ADMELOG) corrective regimen sliding scale   SubCutaneous three times a day before meals  insulin lispro (ADMELOG) corrective regimen sliding scale   SubCutaneous at bedtime  losartan 100 milliGRAM(s) Oral daily  multivitamin 1 Tablet(s) Oral daily  pantoprazole    Tablet 40 milliGRAM(s) Oral daily  polyethylene glycol 3350 17 Gram(s) Oral every 12 hours  senna 2 Tablet(s) Oral at bedtime  sodium chloride 1 Gram(s) Oral two times a day  tamsulosin 0.4 milliGRAM(s) Oral at bedtime    MEDICATIONS  (PRN):  acetaminophen   Tablet .. 650 milliGRAM(s) Oral every 4 hours PRN Mild Pain (1 - 3)  aluminum hydroxide/magnesium hydroxide/simethicone Suspension 30 milliLiter(s) Oral every 4 hours PRN Dyspepsia  ondansetron Injectable 4 milliGRAM(s) IV Push every 6 hours PRN Nausea      Vital Signs Last 24 Hrs  T(C): 36.8 (18 Feb 2021 09:31), Max: 37 (17 Feb 2021 21:30)  T(F): 98.3 (18 Feb 2021 09:31), Max: 98.6 (17 Feb 2021 21:30)  HR: 80 (18 Feb 2021 13:54) (68 - 81)  BP: 152/49 (18 Feb 2021 13:54) (108/41 - 152/49)  BP(mean): 54 (17 Feb 2021 16:19) (54 - 54)  RR: 18 (18 Feb 2021 13:54) (18 - 20)  SpO2: 100% (18 Feb 2021 09:31) (96% - 100%)        Physical Exam:        Constitutional: frail looking  HEENT: NC/AT, EOMI, PERRLA, conjunctivae clear; ears and nose atraumatic; pharynx clear  Neck: supple; thyroid not palpable  Back: no tenderness  Respiratory: respiratory effort normal; clear to auscultation  Cardiovascular: S1S2 regular, 2/6 systolic murmur  Abdomen: soft, not tender, not distended, positive BS; no liver or spleen organomegaly  Genitourinary: no suprapubic tenderness  Musculoskeletal: no muscle tenderness, no joint swelling or tenderness  Neurological/ Psychiatric:  moving all extremities  Skin: no rashes; no palpable lesions    Labs: all available labs reviewed                         Labs:                        11.3   6.61  )-----------( 141      ( 17 Feb 2021 07:20 )             33.8     02-18    130<L>  |  96  |  23  ----------------------------<  115<H>  4.0   |  29  |  0.78    Ca    9.1      18 Feb 2021 07:19             Cultures:       Culture - Blood (collected 02-16-21 @ 13:01)  Source: .Blood Blood-Venous  Preliminary Report (02-17-21 @ 18:02):    No growth to date.    Culture - Blood (collected 02-16-21 @ 13:01)  Source: .Blood Blood-Peripheral  Preliminary Report (02-17-21 @ 18:02):    No growth to date.      C-Reactive Protein, Serum: 2.77 mg/dL (02-16-21 @ 13:06)  D-Dimer Assay, Quantitative: 1053 ng/mL DDU (02-16-21 @ 13:06)  Ferritin, Serum: 1095 ng/mL (02-16-21 @ 13:06)            Respiratory Viral Panel with COVID-19 by JYOTI (02.16.21 @ 13:01)    Rapid RVP Result: [facility]    SARS-CoV-2: [facility]: This Respiratory Panel uses polymerase chain reaction (PCR) to detect for  adenovirus; coronavirus (HKU1, NL63, 229E, OC43); human metapneumovirus  (hMPV); human enterovirus/rhinovirus (Entero/RV); influenza A; influenza  A/H1; influenza A/H3; influenza A/H1-2009; influenza B; parainfluenza  viruses 1, 2, 3, 4; respiratory syncytial virus; Mycoplasma pneumoniae;  Chlamydophila pneumoniae; and SARS-CoV-2.      < from: CT Abdomen and Pelvis w/ IV Cont (02.16.21 @ 15:40) >    EXAM:  CT ABDOMEN AND PELVIS IC                          EXAM:  CT ANGIO CHEST PE PROTOCOL IC                             PROCEDURE DATE:  02/16/2021          INTERPRETATION:  CLINICAL INFORMATION: Shortness of breath and hypoxic. Evaluate for pulmonary embolism.  Abdominal tenderness.    COMPARISON: None.    PROCEDURE:  CT Angiography of the Chest was performed followed by portal venous phase imaging of the Abdomen and Pelvis.  IV Contrast: 90 ml of Omnipaque 350 was injected intravenously. 10ml were discarded.  Oral contrast: None.  Sagittal and coronal reformats were performed as well as 3D (MIP) reconstructions.    FINDINGS:    CHEST:    LUNGS AND LARGE AIRWAYS: PLEURA:  There is diffuse bilateral interlobular septal thickening with patchy groundglass opacities and moderate-sized bilateral pleural effusions, findings likely reflecting interstitial pulmonary edema.  More confluent airspace consolidation involving the posterior segment right upper lobe, and right middle lobe could reflect superimposed infection/pneumonia.    There is bibasilar compressive atelectasis.    There are 2 adjacent nodules medially at the right lung apex measuring 8 mm and 7 mm respectively.    The central airways remain patent.    VESSELS: Evaluation of the subsegmental pulmonary arterial vasculature is somewhat limited particularly in the right middle and bilateral lower lobes; otherwise, there is no CT evidence for acute pulmonary embolism.  There is atherosclerotic calcification of the thoracic aorta with coronary artery calcification.    HEART: Cardiomegaly. Mitral calcification.   No pericardial effusion.    MEDIASTINUM AND KAY: Subcentimeter mediastinal lymph nodes. Possible calcified right hilar lymph node.  Enlarged right epiphrenic lymph nodes measuring up to 1.5 cm.    CHEST WALL AND LOWER NECK: Within normal limits.    ABDOMEN AND PELVIS:    LIVER: Within normal limits.  BILE DUCTS: Mild prominence of the common bile duct measuring up to 9 mm with 2 mm calcification within the distal common bile duct.  GALLBLADDER: Cholelithiasis.  SPLEEN: Within normal limits.  PANCREAS: Within normal limits.  ADRENALS: Within normal limits.  KIDNEYS/URETERS: Mild bilateral hydroureteronephrosis.  Left renal cyst.    BLADDER: Markedly distended urinary bladder, correlate with urine output.  REPRODUCTIVE ORGANS: Probable prior hysterectomy.  Surgical clips right adnexal.  Left adnexal calcification.    BOWEL: Distended, redundant sigmoid colon with fecal retention.  No bowel obstruction.   Appendix not visualized on this exam.  PERITONEUM: No ascites.  No localized intra-abdominal fluid collection or pneumoperitoneum noted.    VESSELS: Atherosclerotic calcification, abdominal aorta is normal in caliber.  RETROPERITONEUM/LYMPH NODES: Para-aortic and aortocaval lymph nodes, measuring up to 10 mm in short axis.  ABDOMINAL WALL: Umbilical hernia containing fat and small amount of nonobstructed small bowel.    BONES:  Degenerative changes spine.  Mild age indeterminate compression deformity superior endplate of L2.    IMPRESSION:    Limited evaluation of the subsegmental pulmonary vasculature as discussed otherwise, no acute pulmonary embolism noted.    Findings likely reflecting pulmonary edema.  More confluent airspace consolidation right upper and middle lobes, cannot exclude superimposed infection/pneumonia.    Cholelithiasis with suggestion of choledocholithiasis with small calcification distal CBD.    Mild bilateral hydroureteronephrosis likely secondary to marked distention of the urinary bladder.  Correlate for bladder outlet obstruction.    Fecal retention with distended redundant sigmoid colon.    Other findings as discussed above.    < end of copied text >            Radiology: all available radiological tests reviewed    Advanced directives addressed: full resuscitation

## 2021-02-19 LAB
ANION GAP SERPL CALC-SCNC: 6 MMOL/L — SIGNIFICANT CHANGE UP (ref 5–17)
BUN SERPL-MCNC: 16 MG/DL — SIGNIFICANT CHANGE UP (ref 7–23)
CALCIUM SERPL-MCNC: 8.8 MG/DL — SIGNIFICANT CHANGE UP (ref 8.5–10.1)
CHLORIDE SERPL-SCNC: 99 MMOL/L — SIGNIFICANT CHANGE UP (ref 96–108)
CO2 SERPL-SCNC: 27 MMOL/L — SIGNIFICANT CHANGE UP (ref 22–31)
CREAT SERPL-MCNC: 0.67 MG/DL — SIGNIFICANT CHANGE UP (ref 0.5–1.3)
GLUCOSE SERPL-MCNC: 218 MG/DL — HIGH (ref 70–99)
HCT VFR BLD CALC: 32.6 % — LOW (ref 34.5–45)
HGB BLD-MCNC: 10.8 G/DL — LOW (ref 11.5–15.5)
MCHC RBC-ENTMCNC: 29.7 PG — SIGNIFICANT CHANGE UP (ref 27–34)
MCHC RBC-ENTMCNC: 33.1 GM/DL — SIGNIFICANT CHANGE UP (ref 32–36)
MCV RBC AUTO: 89.6 FL — SIGNIFICANT CHANGE UP (ref 80–100)
PLATELET # BLD AUTO: 129 K/UL — LOW (ref 150–400)
POTASSIUM SERPL-MCNC: 4 MMOL/L — SIGNIFICANT CHANGE UP (ref 3.5–5.3)
POTASSIUM SERPL-SCNC: 4 MMOL/L — SIGNIFICANT CHANGE UP (ref 3.5–5.3)
RBC # BLD: 3.64 M/UL — LOW (ref 3.8–5.2)
RBC # FLD: 15.1 % — HIGH (ref 10.3–14.5)
SODIUM SERPL-SCNC: 132 MMOL/L — LOW (ref 135–145)
WBC # BLD: 5.76 K/UL — SIGNIFICANT CHANGE UP (ref 3.8–10.5)
WBC # FLD AUTO: 5.76 K/UL — SIGNIFICANT CHANGE UP (ref 3.8–10.5)

## 2021-02-19 PROCEDURE — 99232 SBSQ HOSP IP/OBS MODERATE 35: CPT

## 2021-02-19 RX ORDER — AMLODIPINE BESYLATE 2.5 MG/1
5 TABLET ORAL DAILY
Refills: 0 | Status: DISCONTINUED | OUTPATIENT
Start: 2021-02-19 | End: 2021-02-22

## 2021-02-19 RX ORDER — INSULIN GLARGINE 100 [IU]/ML
72 INJECTION, SOLUTION SUBCUTANEOUS AT BEDTIME
Refills: 0 | Status: DISCONTINUED | OUTPATIENT
Start: 2021-02-19 | End: 2021-02-20

## 2021-02-19 RX ADMIN — CEFTRIAXONE 1000 MILLIGRAM(S): 500 INJECTION, POWDER, FOR SOLUTION INTRAMUSCULAR; INTRAVENOUS at 15:51

## 2021-02-19 RX ADMIN — ATORVASTATIN CALCIUM 20 MILLIGRAM(S): 80 TABLET, FILM COATED ORAL at 21:26

## 2021-02-19 RX ADMIN — TAMSULOSIN HYDROCHLORIDE 0.4 MILLIGRAM(S): 0.4 CAPSULE ORAL at 21:26

## 2021-02-19 RX ADMIN — AZITHROMYCIN 500 MILLIGRAM(S): 500 TABLET, FILM COATED ORAL at 08:50

## 2021-02-19 RX ADMIN — Medication 6: at 18:24

## 2021-02-19 RX ADMIN — Medication 50 MILLIGRAM(S): at 21:25

## 2021-02-19 RX ADMIN — GABAPENTIN 300 MILLIGRAM(S): 400 CAPSULE ORAL at 05:50

## 2021-02-19 RX ADMIN — Medication 50 MILLIGRAM(S): at 08:50

## 2021-02-19 RX ADMIN — Medication 1 TABLET(S): at 08:50

## 2021-02-19 RX ADMIN — SODIUM CHLORIDE 1 GRAM(S): 9 INJECTION INTRAMUSCULAR; INTRAVENOUS; SUBCUTANEOUS at 21:26

## 2021-02-19 RX ADMIN — ENOXAPARIN SODIUM 40 MILLIGRAM(S): 100 INJECTION SUBCUTANEOUS at 08:50

## 2021-02-19 RX ADMIN — GABAPENTIN 300 MILLIGRAM(S): 400 CAPSULE ORAL at 21:26

## 2021-02-19 RX ADMIN — Medication 4: at 07:57

## 2021-02-19 RX ADMIN — INSULIN GLARGINE 72 UNIT(S): 100 INJECTION, SOLUTION SUBCUTANEOUS at 21:26

## 2021-02-19 RX ADMIN — Medication 10 MILLIGRAM(S): at 21:26

## 2021-02-19 RX ADMIN — Medication 10 MILLIGRAM(S): at 12:52

## 2021-02-19 RX ADMIN — LOSARTAN POTASSIUM 100 MILLIGRAM(S): 100 TABLET, FILM COATED ORAL at 08:50

## 2021-02-19 RX ADMIN — POLYETHYLENE GLYCOL 3350 17 GRAM(S): 17 POWDER, FOR SOLUTION ORAL at 08:50

## 2021-02-19 RX ADMIN — Medication 4: at 11:23

## 2021-02-19 RX ADMIN — Medication 10 MILLIGRAM(S): at 05:50

## 2021-02-19 RX ADMIN — Medication 81 MILLIGRAM(S): at 08:50

## 2021-02-19 RX ADMIN — SENNA PLUS 2 TABLET(S): 8.6 TABLET ORAL at 21:26

## 2021-02-19 RX ADMIN — POLYETHYLENE GLYCOL 3350 17 GRAM(S): 17 POWDER, FOR SOLUTION ORAL at 21:25

## 2021-02-19 RX ADMIN — SODIUM CHLORIDE 1 GRAM(S): 9 INJECTION INTRAMUSCULAR; INTRAVENOUS; SUBCUTANEOUS at 08:50

## 2021-02-19 RX ADMIN — AMLODIPINE BESYLATE 5 MILLIGRAM(S): 2.5 TABLET ORAL at 18:24

## 2021-02-19 RX ADMIN — PANTOPRAZOLE SODIUM 40 MILLIGRAM(S): 20 TABLET, DELAYED RELEASE ORAL at 08:50

## 2021-02-19 RX ADMIN — GABAPENTIN 300 MILLIGRAM(S): 400 CAPSULE ORAL at 12:52

## 2021-02-19 NOTE — PROGRESS NOTE ADULT - ASSESSMENT
#B/l pulmonary infiltrates- multifocal pneumonia  #Acute hypoxic resp failure  Titrate O2 to keep sat >94% on 2L  High suspicion for COVID, but swab is negative  Antibody positive  No PE  Continue IV Rocephin+Zithro  #4for CAP  ID eval appreciated  Pulm eval DR Thompson appreciated  Albuterol MDI  Elevated lactate- doubt sepsis, WBC wnl and hemodynamics are stable    acute on chronic anemia monitor   iron studies    Choledocholithiasis-asymptomatic  Discussed with Dr Lewis-follow up with him as an outpatient    #H/o CVA  Cont Aspirin, statin    #Diabetes  Lantus +ISS, tirrate up  Hold oral hypoglycemic meds    #Hypercalcemia- resolved    #Chr hyponatremia  ON NaCl tab at home- will increase to BID and monitor BMP    #HTN  Resume home meds  Adjust further if remains elevated    #COVID neg    #DVT proph- Lovenox           #B/l pulmonary infiltrates- multifocal pneumonia  #Acute hypoxic resp failure  Titrate O2 to keep sat >94% on 2L  High suspicion for COVID, but swab is negative  Antibody positive  No PE  Continue IV Rocephin+Zithro  #4for CAP  ID eval appreciated  Pulm eval DR Thompson appreciated  Albuterol MDI  Elevated lactate- doubt sepsis, WBC wnl and hemodynamics are stable    acute on chronic anemia monitor   iron studies    Choledocholithiasis-asymptomatic  Discussed with Dr Lewis-follow up with him as an outpatient    AUR on admission   trial of voiding today    #H/o CVA  Cont Aspirin, statin    #Diabetes  Lantus +ISS, tirrate up  Hold oral hypoglycemic meds    #Hypercalcemia- resolved    #Chr hyponatremia  ON NaCl tab at home- will increase to BID and monitor BMP    #HTN  Resume home meds  will add norvasc   Adjust further if remains elevated    #COVID neg    #DVT proph- Lovenox           #B/l pulmonary infiltrates- multifocal pneumonia  #Acute hypoxic resp failure  Titrate O2 to keep sat >94% on 2L  High suspicion for COVID, but swab is negative  Antibody positive  No PE  Continue IV Rocephin+Zithro  #4for CAP  ID eval appreciated  Pulm eval DR Thompson appreciated  Albuterol MDI  Elevated lactate- doubt sepsis, WBC wnl and hemodynamics are stable    acute on chronic anemia monitor   iron studies    Choledocholithiasis-asymptomatic  Discussed with Dr Lewis-follow up with him as an outpatient    AUR on admission  per urology pt to go home with bruce leg bag     #H/o CVA  Cont Aspirin, statin    #Diabetes  Lantus +ISS, tirrate up  Hold oral hypoglycemic meds    #Hypercalcemia- resolved    #Chr hyponatremia  ON NaCl tab at home- will increase to BID and monitor BMP    #HTN  Resume home meds  will add norvasc   Adjust further if remains elevated    #COVID neg    #DVT proph- Lovenox

## 2021-02-19 NOTE — PROGRESS NOTE ADULT - SUBJECTIVE AND OBJECTIVE BOX
Subjective:    pat better, lying in bed.    Home Medications:  Aspirin Enteric Coated 81 mg oral delayed release tablet: 1 tab(s) orally once a day (16 Feb 2021 15:32)  atorvastatin 20 mg oral tablet: 1 tab(s) orally once a day (16 Feb 2021 15:32)  Caltrate 600 mg oral tablet: 1 tab(s) orally 2 times a day (16 Feb 2021 15:32)  cyanocobalamin 1000 mcg oral tablet: 2 tab(s) orally once a day (16 Feb 2021 15:32)  gabapentin 300 mg oral capsule: 1 cap(s) orally 3 times a day (16 Feb 2021 15:32)  Januvia 100 mg oral tablet: 1 tab(s) orally once a day (16 Feb 2021 15:32)  Levemir 100 units/mL subcutaneous solution: 68 unit(s) subcutaneously once a day (at bedtime) (16 Feb 2021 15:32)  losartan 100 mg oral tablet: 1 tab(s) orally once a day (16 Feb 2021 15:32)  metFORMIN 1000 mg oral tablet: 1 tab(s) orally 2 times a day (16 Feb 2021 15:32)  Multiple Vitamins oral tablet: 1 tab(s) orally once a day (16 Feb 2021 15:32)  pantoprazole 20 mg oral delayed release tablet: 1 tab(s) orally once a day (16 Feb 2021 15:32)  senna 8.6 mg oral tablet: 2 tab(s) orally once a day (at bedtime) (16 Feb 2021 15:32)  sodium chloride 1 g oral tablet: 1 tab(s) orally once a day (16 Feb 2021 15:38)  solifenacin 10 mg oral tablet: 1 tab(s) orally once a day (16 Feb 2021 15:32)  Vitamin B1 100 mg oral tablet: 1 tab(s) orally once a day (16 Feb 2021 15:32)  Vitamin B6 50 mg oral tablet: 1 tab(s) orally once a day (16 Feb 2021 15:32)    MEDICATIONS  (STANDING):  aspirin enteric coated 81 milliGRAM(s) Oral daily  atorvastatin 20 milliGRAM(s) Oral at bedtime  azithromycin   Tablet 500 milliGRAM(s) Oral daily  bethanechol 50 milliGRAM(s) Oral two times a day  cefTRIAXone Injectable. 1000 milliGRAM(s) IV Push every 24 hours  cefTRIAXone Injectable.      dextrose 40% Gel 15 Gram(s) Oral once  dextrose 5%. 1000 milliLiter(s) (50 mL/Hr) IV Continuous <Continuous>  dextrose 5%. 1000 milliLiter(s) (100 mL/Hr) IV Continuous <Continuous>  dextrose 50% Injectable 25 Gram(s) IV Push once  dextrose 50% Injectable 12.5 Gram(s) IV Push once  dextrose 50% Injectable 25 Gram(s) IV Push once  enoxaparin Injectable 40 milliGRAM(s) SubCutaneous daily  gabapentin 300 milliGRAM(s) Oral three times a day  glucagon  Injectable 1 milliGRAM(s) IntraMuscular once  hydrALAZINE 10 milliGRAM(s) Oral every 8 hours  insulin glargine Injectable (LANTUS) 68 Unit(s) SubCutaneous at bedtime  insulin lispro (ADMELOG) corrective regimen sliding scale   SubCutaneous three times a day before meals  insulin lispro (ADMELOG) corrective regimen sliding scale   SubCutaneous at bedtime  losartan 100 milliGRAM(s) Oral daily  multivitamin 1 Tablet(s) Oral daily  pantoprazole    Tablet 40 milliGRAM(s) Oral daily  polyethylene glycol 3350 17 Gram(s) Oral every 12 hours  senna 2 Tablet(s) Oral at bedtime  sodium chloride 1 Gram(s) Oral two times a day  tamsulosin 0.4 milliGRAM(s) Oral at bedtime    MEDICATIONS  (PRN):  acetaminophen   Tablet .. 650 milliGRAM(s) Oral every 4 hours PRN Mild Pain (1 - 3)  aluminum hydroxide/magnesium hydroxide/simethicone Suspension 30 milliLiter(s) Oral every 4 hours PRN Dyspepsia  ondansetron Injectable 4 milliGRAM(s) IV Push every 6 hours PRN Nausea      Allergies    No Known Allergies    Intolerances        Vital Signs Last 24 Hrs  T(C): 36.9 (19 Feb 2021 08:36), Max: 37.1 (18 Feb 2021 21:46)  T(F): 98.5 (19 Feb 2021 08:36), Max: 98.8 (18 Feb 2021 21:46)  HR: 67 (19 Feb 2021 12:49) (67 - 94)  BP: 147/54 (19 Feb 2021 12:49) (129/77 - 152/49)  BP(mean): --  RR: 17 (19 Feb 2021 12:49) (17 - 18)  SpO2: 98% (19 Feb 2021 12:49) (97% - 100%)      PHYSICAL EXAMINATION:    NECK:  Supple. No lymphadenopathy. Jugular venous pressure not elevated. Carotids equal.   HEART:   The cardiac impulse has a normal quality. Reg., Nl S1 and S2.  There are no murmurs, rubs or gallops noted  CHEST:  Chest crackles to auscultation. Normal respiratory effort.  ABDOMEN:  Soft and nontender.   EXTREMITIES:  There is no edema.       LABS:                        10.8   5.76  )-----------( 129      ( 19 Feb 2021 07:03 )             32.6     02-19    132<L>  |  99  |  16  ----------------------------<  218<H>  4.0   |  27  |  0.67    Ca    8.8      19 Feb 2021 07:03

## 2021-02-19 NOTE — PROGRESS NOTE ADULT - SUBJECTIVE AND OBJECTIVE BOX
71yo/F with PMH CVA about 8 years ago on Aspirin/statin, chronic hyponatremia, diabetes w/diabetic neuropathy presented for evaluation of worsening SOB. Patient unable to provide clear history, mainly obtained from daughter over the phone. Per daughter, she was OK yesterday, but woke up today SOB and they send her in to the hospital.        02/17/21: Patient seen and examined. Still with sob. Discussed with patient regarding management and d/c plan.   Discussed with her daughter and son in law regarding management plan.   02/18/21: Patient seen and examined. Feels better today. Discussed with daughter  2/19-has pain at bruce site , will try voiding trial no abd pain, feels better other wise , still has generalised weaknes      PHYSICAL EXAM:    Daily     Daily     ICU Vital Signs Last 24 Hrs  T(C): 36.9 (19 Feb 2021 08:36), Max: 37.1 (18 Feb 2021 21:46)  T(F): 98.5 (19 Feb 2021 08:36), Max: 98.8 (18 Feb 2021 21:46)  HR: 67 (19 Feb 2021 12:49) (67 - 94)  BP: 147/54 (19 Feb 2021 12:49) (129/77 - 150/57)  BP(mean): --  ABP: --  ABP(mean): --  RR: 17 (19 Feb 2021 12:49) (17 - 18)  SpO2: 98% (19 Feb 2021 12:49) (97% - 100%)      Constitutional: NAD  HEENT: Atraumatic, BENIGNO, Normal, No congestion  Respiratory: Breath Sounds normal, no rhonchi/wheeze  Cardiovascular: N S1S2;   Gastrointestinal: Abdomen soft, non tender, Bowel Ssounds present  Extremities: No edema, peripheral pulses present  Neurological: AAO x 3, no gross focal motor deficits                              10.8   5.76  )-----------( 129      ( 19 Feb 2021 07:03 )             32.6       CBC Full  -  ( 19 Feb 2021 07:03 )  WBC Count : 5.76 K/uL  RBC Count : 3.64 M/uL  Hemoglobin : 10.8 g/dL  Hematocrit : 32.6 %  Platelet Count - Automated : 129 K/uL  Mean Cell Volume : 89.6 fl  Mean Cell Hemoglobin : 29.7 pg  Mean Cell Hemoglobin Concentration : 33.1 gm/dL  Auto Neutrophil # : x  Auto Lymphocyte # : x  Auto Monocyte # : x  Auto Eosinophil # : x  Auto Basophil # : x  Auto Neutrophil % : x  Auto Lymphocyte % : x  Auto Monocyte % : x  Auto Eosinophil % : x  Auto Basophil % : x      02-19    132<L>  |  99  |  16  ----------------------------<  218<H>  4.0   |  27  |  0.67    Ca    8.8      19 Feb 2021 07:03                              MEDICATIONS  (STANDING):  aspirin enteric coated 81 milliGRAM(s) Oral daily  atorvastatin 20 milliGRAM(s) Oral at bedtime  azithromycin   Tablet 500 milliGRAM(s) Oral daily  bethanechol 50 milliGRAM(s) Oral two times a day  cefTRIAXone Injectable. 1000 milliGRAM(s) IV Push every 24 hours  cefTRIAXone Injectable.      dextrose 40% Gel 15 Gram(s) Oral once  dextrose 5%. 1000 milliLiter(s) (50 mL/Hr) IV Continuous <Continuous>  dextrose 5%. 1000 milliLiter(s) (100 mL/Hr) IV Continuous <Continuous>  dextrose 50% Injectable 25 Gram(s) IV Push once  dextrose 50% Injectable 12.5 Gram(s) IV Push once  dextrose 50% Injectable 25 Gram(s) IV Push once  enoxaparin Injectable 40 milliGRAM(s) SubCutaneous daily  gabapentin 300 milliGRAM(s) Oral three times a day  glucagon  Injectable 1 milliGRAM(s) IntraMuscular once  hydrALAZINE 10 milliGRAM(s) Oral every 8 hours  insulin glargine Injectable (LANTUS) 68 Unit(s) SubCutaneous at bedtime  insulin lispro (ADMELOG) corrective regimen sliding scale   SubCutaneous three times a day before meals  insulin lispro (ADMELOG) corrective regimen sliding scale   SubCutaneous at bedtime  losartan 100 milliGRAM(s) Oral daily  multivitamin 1 Tablet(s) Oral daily  pantoprazole    Tablet 40 milliGRAM(s) Oral daily  polyethylene glycol 3350 17 Gram(s) Oral every 12 hours  senna 2 Tablet(s) Oral at bedtime  sodium chloride 1 Gram(s) Oral two times a day  tamsulosin 0.4 milliGRAM(s) Oral at bedtime

## 2021-02-20 LAB
ALBUMIN SERPL ELPH-MCNC: 2.8 G/DL — LOW (ref 3.3–5)
ALP SERPL-CCNC: 130 U/L — HIGH (ref 40–120)
ALT FLD-CCNC: 193 U/L — HIGH (ref 12–78)
ANION GAP SERPL CALC-SCNC: 3 MMOL/L — LOW (ref 5–17)
AST SERPL-CCNC: 59 U/L — HIGH (ref 15–37)
BASOPHILS # BLD AUTO: 0.02 K/UL — SIGNIFICANT CHANGE UP (ref 0–0.2)
BASOPHILS NFR BLD AUTO: 0.3 % — SIGNIFICANT CHANGE UP (ref 0–2)
BILIRUB DIRECT SERPL-MCNC: 0.2 MG/DL — SIGNIFICANT CHANGE UP (ref 0–0.2)
BILIRUB INDIRECT FLD-MCNC: 0.4 MG/DL — SIGNIFICANT CHANGE UP (ref 0.2–1)
BILIRUB SERPL-MCNC: 0.6 MG/DL — SIGNIFICANT CHANGE UP (ref 0.2–1.2)
BILIRUB SERPL-MCNC: 0.6 MG/DL — SIGNIFICANT CHANGE UP (ref 0.2–1.2)
BUN SERPL-MCNC: 15 MG/DL — SIGNIFICANT CHANGE UP (ref 7–23)
CALCIUM SERPL-MCNC: 9 MG/DL — SIGNIFICANT CHANGE UP (ref 8.5–10.1)
CHLORIDE SERPL-SCNC: 100 MMOL/L — SIGNIFICANT CHANGE UP (ref 96–108)
CO2 SERPL-SCNC: 29 MMOL/L — SIGNIFICANT CHANGE UP (ref 22–31)
CREAT SERPL-MCNC: 0.66 MG/DL — SIGNIFICANT CHANGE UP (ref 0.5–1.3)
EOSINOPHIL # BLD AUTO: 0.11 K/UL — SIGNIFICANT CHANGE UP (ref 0–0.5)
EOSINOPHIL NFR BLD AUTO: 1.7 % — SIGNIFICANT CHANGE UP (ref 0–6)
FERRITIN SERPL-MCNC: 972 NG/ML — HIGH (ref 15–150)
GLUCOSE SERPL-MCNC: 214 MG/DL — HIGH (ref 70–99)
HAPTOGLOB SERPL-MCNC: 136 MG/DL — SIGNIFICANT CHANGE UP (ref 34–200)
HCT VFR BLD CALC: 32.4 % — LOW (ref 34.5–45)
HGB BLD-MCNC: 10.5 G/DL — LOW (ref 11.5–15.5)
IMM GRANULOCYTES NFR BLD AUTO: 0.8 % — SIGNIFICANT CHANGE UP (ref 0–1.5)
IRON SATN MFR SERPL: 25 % — SIGNIFICANT CHANGE UP (ref 14–50)
IRON SATN MFR SERPL: 50 UG/DL — SIGNIFICANT CHANGE UP (ref 30–160)
LYMPHOCYTES # BLD AUTO: 0.82 K/UL — LOW (ref 1–3.3)
LYMPHOCYTES # BLD AUTO: 12.8 % — LOW (ref 13–44)
MCHC RBC-ENTMCNC: 29.4 PG — SIGNIFICANT CHANGE UP (ref 27–34)
MCHC RBC-ENTMCNC: 32.4 GM/DL — SIGNIFICANT CHANGE UP (ref 32–36)
MCV RBC AUTO: 90.8 FL — SIGNIFICANT CHANGE UP (ref 80–100)
MONOCYTES # BLD AUTO: 0.72 K/UL — SIGNIFICANT CHANGE UP (ref 0–0.9)
MONOCYTES NFR BLD AUTO: 11.3 % — SIGNIFICANT CHANGE UP (ref 2–14)
NEUTROPHILS # BLD AUTO: 4.67 K/UL — SIGNIFICANT CHANGE UP (ref 1.8–7.4)
NEUTROPHILS NFR BLD AUTO: 73.1 % — SIGNIFICANT CHANGE UP (ref 43–77)
PLATELET # BLD AUTO: 142 K/UL — LOW (ref 150–400)
POTASSIUM SERPL-MCNC: 4.1 MMOL/L — SIGNIFICANT CHANGE UP (ref 3.5–5.3)
POTASSIUM SERPL-SCNC: 4.1 MMOL/L — SIGNIFICANT CHANGE UP (ref 3.5–5.3)
PROT SERPL-MCNC: 6.4 GM/DL — SIGNIFICANT CHANGE UP (ref 6–8.3)
RBC # BLD: 3.57 M/UL — LOW (ref 3.8–5.2)
RBC # BLD: 3.57 M/UL — LOW (ref 3.8–5.2)
RBC # FLD: 14.9 % — HIGH (ref 10.3–14.5)
RETICS #: 99.6 K/UL — SIGNIFICANT CHANGE UP (ref 25–125)
RETICS/RBC NFR: 2.8 % — HIGH (ref 0.5–2.5)
SODIUM SERPL-SCNC: 132 MMOL/L — LOW (ref 135–145)
TIBC SERPL-MCNC: 204 UG/DL — LOW (ref 220–430)
UIBC SERPL-MCNC: 153 UG/DL — SIGNIFICANT CHANGE UP (ref 110–370)
VIT B12 SERPL-MCNC: 1888 PG/ML — HIGH (ref 232–1245)
WBC # BLD: 6.39 K/UL — SIGNIFICANT CHANGE UP (ref 3.8–10.5)
WBC # FLD AUTO: 6.39 K/UL — SIGNIFICANT CHANGE UP (ref 3.8–10.5)

## 2021-02-20 PROCEDURE — 99232 SBSQ HOSP IP/OBS MODERATE 35: CPT

## 2021-02-20 RX ORDER — INSULIN GLARGINE 100 [IU]/ML
74 INJECTION, SOLUTION SUBCUTANEOUS AT BEDTIME
Refills: 0 | Status: DISCONTINUED | OUTPATIENT
Start: 2021-02-20 | End: 2021-02-21

## 2021-02-20 RX ADMIN — GABAPENTIN 300 MILLIGRAM(S): 400 CAPSULE ORAL at 21:31

## 2021-02-20 RX ADMIN — GABAPENTIN 300 MILLIGRAM(S): 400 CAPSULE ORAL at 13:39

## 2021-02-20 RX ADMIN — POLYETHYLENE GLYCOL 3350 17 GRAM(S): 17 POWDER, FOR SOLUTION ORAL at 21:31

## 2021-02-20 RX ADMIN — Medication 50 MILLIGRAM(S): at 09:59

## 2021-02-20 RX ADMIN — SENNA PLUS 2 TABLET(S): 8.6 TABLET ORAL at 21:31

## 2021-02-20 RX ADMIN — Medication 81 MILLIGRAM(S): at 09:59

## 2021-02-20 RX ADMIN — Medication 10 MILLIGRAM(S): at 13:39

## 2021-02-20 RX ADMIN — TAMSULOSIN HYDROCHLORIDE 0.4 MILLIGRAM(S): 0.4 CAPSULE ORAL at 21:31

## 2021-02-20 RX ADMIN — Medication 6: at 17:05

## 2021-02-20 RX ADMIN — Medication 4: at 08:31

## 2021-02-20 RX ADMIN — Medication 4: at 11:50

## 2021-02-20 RX ADMIN — INSULIN GLARGINE 74 UNIT(S): 100 INJECTION, SOLUTION SUBCUTANEOUS at 21:32

## 2021-02-20 RX ADMIN — Medication 50 MILLIGRAM(S): at 21:31

## 2021-02-20 RX ADMIN — AZITHROMYCIN 500 MILLIGRAM(S): 500 TABLET, FILM COATED ORAL at 09:59

## 2021-02-20 RX ADMIN — ATORVASTATIN CALCIUM 20 MILLIGRAM(S): 80 TABLET, FILM COATED ORAL at 21:31

## 2021-02-20 RX ADMIN — SODIUM CHLORIDE 1 GRAM(S): 9 INJECTION INTRAMUSCULAR; INTRAVENOUS; SUBCUTANEOUS at 09:59

## 2021-02-20 RX ADMIN — Medication 10 MILLIGRAM(S): at 21:31

## 2021-02-20 RX ADMIN — Medication 10 MILLIGRAM(S): at 05:24

## 2021-02-20 RX ADMIN — CEFTRIAXONE 1000 MILLIGRAM(S): 500 INJECTION, POWDER, FOR SOLUTION INTRAMUSCULAR; INTRAVENOUS at 17:05

## 2021-02-20 RX ADMIN — LOSARTAN POTASSIUM 100 MILLIGRAM(S): 100 TABLET, FILM COATED ORAL at 09:59

## 2021-02-20 RX ADMIN — SODIUM CHLORIDE 1 GRAM(S): 9 INJECTION INTRAMUSCULAR; INTRAVENOUS; SUBCUTANEOUS at 21:31

## 2021-02-20 RX ADMIN — PANTOPRAZOLE SODIUM 40 MILLIGRAM(S): 20 TABLET, DELAYED RELEASE ORAL at 09:59

## 2021-02-20 RX ADMIN — Medication 1 TABLET(S): at 09:59

## 2021-02-20 RX ADMIN — GABAPENTIN 300 MILLIGRAM(S): 400 CAPSULE ORAL at 05:25

## 2021-02-20 RX ADMIN — POLYETHYLENE GLYCOL 3350 17 GRAM(S): 17 POWDER, FOR SOLUTION ORAL at 09:59

## 2021-02-20 RX ADMIN — Medication 2: at 21:32

## 2021-02-20 RX ADMIN — ENOXAPARIN SODIUM 40 MILLIGRAM(S): 100 INJECTION SUBCUTANEOUS at 09:59

## 2021-02-20 NOTE — PROGRESS NOTE ADULT - ASSESSMENT
71yo/F with PMH CVA about 8 years ago on Aspirin/statin, chronic hyponatremia, diabetes w/diabetic neuropathy admitted on 2/16 for evaluation of shortness of breath of one day duration. The patient does note mid sternal pain with deep inspiration. The patient is poor historian and history per medical record.   1. Patient admitted with pneumonia which will treat as community acquired pneumonia  - follow up cultures   - serial cbc and monitor temperature   - oxygen and nebs as needed   - reviewed prior medical records to evaluate for resistant or atypical pathogens   - iv hydration and supportive care   - agree with ceftiraxone and zithromax as ordered, day #4  - tolerating antibiotics without rashes or side effects   - ceftriaxone will cover urinary tract infection as well  2. other issues: per medicine

## 2021-02-20 NOTE — PROGRESS NOTE ADULT - ASSESSMENT
#B/l pulmonary infiltrates- multifocal pneumonia  #Acute hypoxic resp failure  Titrate O2 to keep sat >94% on 2L  High suspicion for COVID, but swab is negative  Antibody positive  No PE  Continue IV Rocephin+Zithro  #5for CAP, on 2L  ID eval appreciated  Pulm eval DR Thompson appreciated  Albuterol MDI  Elevated lactate- doubt sepsis, WBC wnl and hemodynamics are stable    acute on chronic anemia monitor   iron studies indicative of mixed inflammatory vs ? iron deficiency-retic count 2.8  no overt signs of bleed  if hb remains stable over next 24 to 48 hrs then dc home with home care     Choledocholithiasis-asymptomatic  Discussed with Dr Lewis-follow up with him as an outpatient    AUR on admission  per urology pt to go home with bruce leg bag     #H/o CVA  Cont Aspirin, statin    #Diabetes  Lantus +ISS, tirrate up  Hold oral hypoglycemic meds    #Hypercalcemia- resolved    #Chr hyponatremia  ON NaCl tab at home- will increase to BID and monitor BMP    #HTN  Resume home meds  will add norvasc   Adjust further if remains elevated    #COVID neg    #DVT proph- Lovenox

## 2021-02-20 NOTE — PROGRESS NOTE ADULT - SUBJECTIVE AND OBJECTIVE BOX
71yo/F with PMH CVA about 8 years ago on Aspirin/statin, chronic hyponatremia, diabetes w/diabetic neuropathy presented for evaluation of worsening SOB. Patient unable to provide clear history, mainly obtained from daughter over the phone. Per daughter, she was OK yesterday, but woke up today SOB and they send her in to the hospital.        02/17/21: Patient seen and examined. Still with sob. Discussed with patient regarding management and d/c plan.   Discussed with her daughter and son in law regarding management plan.   02/18/21: Patient seen and examined. Feels better today. Discussed with daughter  2/19-has pain at bruce site , will try voiding trial no abd pain, feels better other wise , still has generalised weaknes  2/20 feels better today, sitting in chair   Constitutional: NAD  HEENT: Atraumatic, BENIGNO, Normal, No congestion  Respiratory: Breath Sounds normal, no rhonchi/wheeze  Cardiovascular: N S1S2;   Gastrointestinal: Abdomen soft, non tender, Bowel Ssounds present  Extremities: No edema, peripheral pulses present  Neurological: AAO x 3, no gross focal motor deficits      PHYSICAL EXAM:    Daily     Daily     ICU Vital Signs Last 24 Hrs  T(C): 36.7 (20 Feb 2021 13:37), Max: 37.4 (20 Feb 2021 05:25)  T(F): 98 (20 Feb 2021 13:37), Max: 99.3 (20 Feb 2021 05:25)  HR: 66 (20 Feb 2021 13:37) (66 - 72)  BP: 131/43 (20 Feb 2021 13:37) (131/42 - 171/48)  BP(mean): --  ABP: --  ABP(mean): --  RR: 16 (20 Feb 2021 13:37) (16 - 18)  SpO2: 100% (20 Feb 2021 13:37) (95% - 100%)                                10.5   6.39  )-----------( 142      ( 20 Feb 2021 07:20 )             32.4       CBC Full  -  ( 20 Feb 2021 07:20 )  WBC Count : 6.39 K/uL  RBC Count : 3.57 M/uL  Hemoglobin : 10.5 g/dL  Hematocrit : 32.4 %  Platelet Count - Automated : 142 K/uL  Mean Cell Volume : 90.8 fl  Mean Cell Hemoglobin : 29.4 pg  Mean Cell Hemoglobin Concentration : 32.4 gm/dL  Auto Neutrophil # : 4.67 K/uL  Auto Lymphocyte # : 0.82 K/uL  Auto Monocyte # : 0.72 K/uL  Auto Eosinophil # : 0.11 K/uL  Auto Basophil # : 0.02 K/uL  Auto Neutrophil % : 73.1 %  Auto Lymphocyte % : 12.8 %  Auto Monocyte % : 11.3 %  Auto Eosinophil % : 1.7 %  Auto Basophil % : 0.3 %      02-20    132<L>  |  100  |  15  ----------------------------<  214<H>  4.1   |  29  |  0.66    Ca    9.0      20 Feb 2021 07:20    TPro  6.4  /  Alb  2.8<L>  /  TBili  0.6  /  DBili  0.2  /  AST  59<H>  /  ALT  193<H>  /  AlkPhos  130<H>  02-20      LIVER FUNCTIONS - ( 20 Feb 2021 07:20 )  Alb: 2.8 g/dL / Pro: 6.4 gm/dL / ALK PHOS: 130 U/L / ALT: 193 U/L / AST: 59 U/L / GGT: x                               MEDICATIONS  (STANDING):  aspirin enteric coated 81 milliGRAM(s) Oral daily  atorvastatin 20 milliGRAM(s) Oral at bedtime  azithromycin   Tablet 500 milliGRAM(s) Oral daily  bethanechol 50 milliGRAM(s) Oral two times a day  cefTRIAXone Injectable. 1000 milliGRAM(s) IV Push every 24 hours  cefTRIAXone Injectable.      dextrose 40% Gel 15 Gram(s) Oral once  dextrose 5%. 1000 milliLiter(s) (50 mL/Hr) IV Continuous <Continuous>  dextrose 5%. 1000 milliLiter(s) (100 mL/Hr) IV Continuous <Continuous>  dextrose 50% Injectable 12.5 Gram(s) IV Push once  dextrose 50% Injectable 25 Gram(s) IV Push once  dextrose 50% Injectable 25 Gram(s) IV Push once  enoxaparin Injectable 40 milliGRAM(s) SubCutaneous daily  gabapentin 300 milliGRAM(s) Oral three times a day  glucagon  Injectable 1 milliGRAM(s) IntraMuscular once  hydrALAZINE 10 milliGRAM(s) Oral every 8 hours  insulin glargine Injectable (LANTUS) 72 Unit(s) SubCutaneous at bedtime  insulin lispro (ADMELOG) corrective regimen sliding scale   SubCutaneous three times a day before meals  insulin lispro (ADMELOG) corrective regimen sliding scale   SubCutaneous at bedtime  losartan 100 milliGRAM(s) Oral daily  multivitamin 1 Tablet(s) Oral daily  pantoprazole    Tablet 40 milliGRAM(s) Oral daily  polyethylene glycol 3350 17 Gram(s) Oral every 12 hours  senna 2 Tablet(s) Oral at bedtime  sodium chloride 1 Gram(s) Oral two times a day  tamsulosin 0.4 milliGRAM(s) Oral at bedtime

## 2021-02-20 NOTE — PROGRESS NOTE ADULT - SUBJECTIVE AND OBJECTIVE BOX
Subjective:    pat better, sitting in chair, no respiratory distress.    Home Medications:  Aspirin Enteric Coated 81 mg oral delayed release tablet: 1 tab(s) orally once a day (16 Feb 2021 15:32)  atorvastatin 20 mg oral tablet: 1 tab(s) orally once a day (16 Feb 2021 15:32)  Caltrate 600 mg oral tablet: 1 tab(s) orally 2 times a day (16 Feb 2021 15:32)  cyanocobalamin 1000 mcg oral tablet: 2 tab(s) orally once a day (16 Feb 2021 15:32)  gabapentin 300 mg oral capsule: 1 cap(s) orally 3 times a day (16 Feb 2021 15:32)  Januvia 100 mg oral tablet: 1 tab(s) orally once a day (16 Feb 2021 15:32)  Levemir 100 units/mL subcutaneous solution: 68 unit(s) subcutaneously once a day (at bedtime) (16 Feb 2021 15:32)  losartan 100 mg oral tablet: 1 tab(s) orally once a day (16 Feb 2021 15:32)  metFORMIN 1000 mg oral tablet: 1 tab(s) orally 2 times a day (16 Feb 2021 15:32)  Multiple Vitamins oral tablet: 1 tab(s) orally once a day (16 Feb 2021 15:32)  pantoprazole 20 mg oral delayed release tablet: 1 tab(s) orally once a day (16 Feb 2021 15:32)  senna 8.6 mg oral tablet: 2 tab(s) orally once a day (at bedtime) (16 Feb 2021 15:32)  sodium chloride 1 g oral tablet: 1 tab(s) orally once a day (16 Feb 2021 15:38)  solifenacin 10 mg oral tablet: 1 tab(s) orally once a day (16 Feb 2021 15:32)  Vitamin B1 100 mg oral tablet: 1 tab(s) orally once a day (16 Feb 2021 15:32)  Vitamin B6 50 mg oral tablet: 1 tab(s) orally once a day (16 Feb 2021 15:32)    MEDICATIONS  (STANDING):  aspirin enteric coated 81 milliGRAM(s) Oral daily  atorvastatin 20 milliGRAM(s) Oral at bedtime  azithromycin   Tablet 500 milliGRAM(s) Oral daily  bethanechol 50 milliGRAM(s) Oral two times a day  cefTRIAXone Injectable. 1000 milliGRAM(s) IV Push every 24 hours  cefTRIAXone Injectable.      dextrose 40% Gel 15 Gram(s) Oral once  dextrose 5%. 1000 milliLiter(s) (50 mL/Hr) IV Continuous <Continuous>  dextrose 5%. 1000 milliLiter(s) (100 mL/Hr) IV Continuous <Continuous>  dextrose 50% Injectable 12.5 Gram(s) IV Push once  dextrose 50% Injectable 25 Gram(s) IV Push once  dextrose 50% Injectable 25 Gram(s) IV Push once  enoxaparin Injectable 40 milliGRAM(s) SubCutaneous daily  gabapentin 300 milliGRAM(s) Oral three times a day  glucagon  Injectable 1 milliGRAM(s) IntraMuscular once  hydrALAZINE 10 milliGRAM(s) Oral every 8 hours  insulin glargine Injectable (LANTUS) 74 Unit(s) SubCutaneous at bedtime  insulin lispro (ADMELOG) corrective regimen sliding scale   SubCutaneous three times a day before meals  insulin lispro (ADMELOG) corrective regimen sliding scale   SubCutaneous at bedtime  losartan 100 milliGRAM(s) Oral daily  multivitamin 1 Tablet(s) Oral daily  pantoprazole    Tablet 40 milliGRAM(s) Oral daily  polyethylene glycol 3350 17 Gram(s) Oral every 12 hours  senna 2 Tablet(s) Oral at bedtime  sodium chloride 1 Gram(s) Oral two times a day  tamsulosin 0.4 milliGRAM(s) Oral at bedtime    MEDICATIONS  (PRN):  acetaminophen   Tablet .. 650 milliGRAM(s) Oral every 4 hours PRN Mild Pain (1 - 3)  aluminum hydroxide/magnesium hydroxide/simethicone Suspension 30 milliLiter(s) Oral every 4 hours PRN Dyspepsia  amLODIPine   Tablet 5 milliGRAM(s) Oral daily PRN for SBP>150  ondansetron Injectable 4 milliGRAM(s) IV Push every 6 hours PRN Nausea      Allergies    No Known Allergies    Intolerances        Vital Signs Last 24 Hrs  T(C): 36.7 (20 Feb 2021 13:37), Max: 37.4 (20 Feb 2021 05:25)  T(F): 98 (20 Feb 2021 13:37), Max: 99.3 (20 Feb 2021 05:25)  HR: 66 (20 Feb 2021 13:37) (66 - 72)  BP: 131/43 (20 Feb 2021 13:37) (131/42 - 171/48)  BP(mean): --  RR: 16 (20 Feb 2021 13:37) (16 - 18)  SpO2: 100% (20 Feb 2021 13:37) (95% - 100%)      PHYSICAL EXAMINATION:    NECK:  Supple. No lymphadenopathy. Jugular venous pressure not elevated. Carotids equal.   HEART:   The cardiac impulse has a normal quality. Reg., Nl S1 and S2.  There are no murmurs, rubs or gallops noted  CHEST:  Chest crackles to auscultation. Normal respiratory effort.  ABDOMEN:  Soft and nontender.   EXTREMITIES:  There is no edema.       LABS:                        10.5   6.39  )-----------( 142      ( 20 Feb 2021 07:20 )             32.4     02-20    132<L>  |  100  |  15  ----------------------------<  214<H>  4.1   |  29  |  0.66    Ca    9.0      20 Feb 2021 07:20    TPro  6.4  /  Alb  2.8<L>  /  TBili  0.6  /  DBili  0.2  /  AST  59<H>  /  ALT  193<H>  /  AlkPhos  130<H>  02-20

## 2021-02-20 NOTE — PROGRESS NOTE ADULT - SUBJECTIVE AND OBJECTIVE BOX
Date of service: 02-20-21 @ 15:45    Patient lying in bed; no complaints, afebrile        ROS: unable to obtain secondary to patient medical condition     MEDICATIONS  (STANDING):  aspirin enteric coated 81 milliGRAM(s) Oral daily  atorvastatin 20 milliGRAM(s) Oral at bedtime  azithromycin   Tablet 500 milliGRAM(s) Oral daily  bethanechol 50 milliGRAM(s) Oral two times a day  cefTRIAXone Injectable. 1000 milliGRAM(s) IV Push every 24 hours  cefTRIAXone Injectable.      dextrose 40% Gel 15 Gram(s) Oral once  dextrose 5%. 1000 milliLiter(s) (50 mL/Hr) IV Continuous <Continuous>  dextrose 5%. 1000 milliLiter(s) (100 mL/Hr) IV Continuous <Continuous>  dextrose 50% Injectable 12.5 Gram(s) IV Push once  dextrose 50% Injectable 25 Gram(s) IV Push once  dextrose 50% Injectable 25 Gram(s) IV Push once  enoxaparin Injectable 40 milliGRAM(s) SubCutaneous daily  gabapentin 300 milliGRAM(s) Oral three times a day  glucagon  Injectable 1 milliGRAM(s) IntraMuscular once  hydrALAZINE 10 milliGRAM(s) Oral every 8 hours  insulin glargine Injectable (LANTUS) 74 Unit(s) SubCutaneous at bedtime  insulin lispro (ADMELOG) corrective regimen sliding scale   SubCutaneous three times a day before meals  insulin lispro (ADMELOG) corrective regimen sliding scale   SubCutaneous at bedtime  losartan 100 milliGRAM(s) Oral daily  multivitamin 1 Tablet(s) Oral daily  pantoprazole    Tablet 40 milliGRAM(s) Oral daily  polyethylene glycol 3350 17 Gram(s) Oral every 12 hours  senna 2 Tablet(s) Oral at bedtime  sodium chloride 1 Gram(s) Oral two times a day  tamsulosin 0.4 milliGRAM(s) Oral at bedtime    MEDICATIONS  (PRN):  acetaminophen   Tablet .. 650 milliGRAM(s) Oral every 4 hours PRN Mild Pain (1 - 3)  aluminum hydroxide/magnesium hydroxide/simethicone Suspension 30 milliLiter(s) Oral every 4 hours PRN Dyspepsia  amLODIPine   Tablet 5 milliGRAM(s) Oral daily PRN for SBP>150  ondansetron Injectable 4 milliGRAM(s) IV Push every 6 hours PRN Nausea      Vital Signs Last 24 Hrs  T(C): 36.7 (20 Feb 2021 13:37), Max: 37.4 (20 Feb 2021 05:25)  T(F): 98 (20 Feb 2021 13:37), Max: 99.3 (20 Feb 2021 05:25)  HR: 66 (20 Feb 2021 13:37) (66 - 72)  BP: 131/43 (20 Feb 2021 13:37) (131/42 - 171/48)  BP(mean): --  RR: 16 (20 Feb 2021 13:37) (16 - 18)  SpO2: 100% (20 Feb 2021 13:37) (95% - 100%)        Physical Exam:        Constitutional: frail looking  HEENT: NC/AT, EOMI, PERRLA, conjunctivae clear; ears and nose atraumatic; pharynx clear  Neck: supple; thyroid not palpable  Back: no tenderness  Respiratory: respiratory effort normal; clear to auscultation  Cardiovascular: S1S2 regular, 2/6 systolic murmur  Abdomen: soft, not tender, not distended, positive BS; no liver or spleen organomegaly  Genitourinary: no suprapubic tenderness  Musculoskeletal: no muscle tenderness, no joint swelling or tenderness  Neurological/ Psychiatric:  moving all extremities  Skin: no rashes; no palpable lesions    Labs: all available labs reviewed                         Labs:                       Labs:                        10.5   6.39  )-----------( 142      ( 20 Feb 2021 07:20 )             32.4     02-20    132<L>  |  100  |  15  ----------------------------<  214<H>  4.1   |  29  |  0.66    Ca    9.0      20 Feb 2021 07:20    TPro  6.4  /  Alb  2.8<L>  /  TBili  0.6  /  DBili  0.2  /  AST  59<H>  /  ALT  193<H>  /  AlkPhos  130<H>  02-20           Cultures:       Culture - Blood (collected 02-16-21 @ 13:01)  Source: .Blood Blood-Venous  Preliminary Report (02-17-21 @ 18:02):    No growth to date.    Culture - Blood (collected 02-16-21 @ 13:01)  Source: .Blood Blood-Peripheral  Preliminary Report (02-17-21 @ 18:02):    No growth to date.      Ferritin, Serum: 972 ng/mL (02-20-21 @ 07:20)  C-Reactive Protein, Serum: 2.77 mg/dL (02-16-21 @ 13:06)  D-Dimer Assay, Quantitative: 1053 ng/mL DDU (02-16-21 @ 13:06)  Ferritin, Serum: 1095 ng/mL (02-16-21 @ 13:06)        Respiratory Viral Panel with COVID-19 by JYOTI (02.16.21 @ 13:01)    Rapid RVP Result: [facility]    SARS-CoV-2: NotDete: This Respiratory Panel uses polymerase chain reaction (PCR) to detect for  adenovirus; coronavirus (HKU1, NL63, 229E, OC43); human metapneumovirus  (hMPV); human enterovirus/rhinovirus (Entero/RV); influenza A; influenza  A/H1; influenza A/H3; influenza A/H1-2009; influenza B; parainfluenza  viruses 1, 2, 3, 4; respiratory syncytial virus; Mycoplasma pneumoniae;  Chlamydophila pneumoniae; and SARS-CoV-2.      < from: CT Abdomen and Pelvis w/ IV Cont (02.16.21 @ 15:40) >    EXAM:  CT ABDOMEN AND PELVIS IC                          EXAM:  CT ANGIO CHEST PE PROTOCOL IC                             PROCEDURE DATE:  02/16/2021          INTERPRETATION:  CLINICAL INFORMATION: Shortness of breath and hypoxic. Evaluate for pulmonary embolism.  Abdominal tenderness.    COMPARISON: None.    PROCEDURE:  CT Angiography of the Chest was performed followed by portal venous phase imaging of the Abdomen and Pelvis.  IV Contrast: 90 ml of Omnipaque 350 was injected intravenously. 10ml were discarded.  Oral contrast: None.  Sagittal and coronal reformats were performed as well as 3D (MIP) reconstructions.    FINDINGS:    CHEST:    LUNGS AND LARGE AIRWAYS: PLEURA:  There is diffuse bilateral interlobular septal thickening with patchy groundglass opacities and moderate-sized bilateral pleural effusions, findings likely reflecting interstitial pulmonary edema.  More confluent airspace consolidation involving the posterior segment right upper lobe, and right middle lobe could reflect superimposed infection/pneumonia.    There is bibasilar compressive atelectasis.    There are 2 adjacent nodules medially at the right lung apex measuring 8 mm and 7 mm respectively.    The central airways remain patent.    VESSELS: Evaluation of the subsegmental pulmonary arterial vasculature is somewhat limited particularly in the right middle and bilateral lower lobes; otherwise, there is no CT evidence for acute pulmonary embolism.  There is atherosclerotic calcification of the thoracic aorta with coronary artery calcification.    HEART: Cardiomegaly. Mitral calcification.   No pericardial effusion.    MEDIASTINUM AND KAY: Subcentimeter mediastinal lymph nodes. Possible calcified right hilar lymph node.  Enlarged right epiphrenic lymph nodes measuring up to 1.5 cm.    CHEST WALL AND LOWER NECK: Within normal limits.    ABDOMEN AND PELVIS:    LIVER: Within normal limits.  BILE DUCTS: Mild prominence of the common bile duct measuring up to 9 mm with 2 mm calcification within the distal common bile duct.  GALLBLADDER: Cholelithiasis.  SPLEEN: Within normal limits.  PANCREAS: Within normal limits.  ADRENALS: Within normal limits.  KIDNEYS/URETERS: Mild bilateral hydroureteronephrosis.  Left renal cyst.    BLADDER: Markedly distended urinary bladder, correlate with urine output.  REPRODUCTIVE ORGANS: Probable prior hysterectomy.  Surgical clips right adnexal.  Left adnexal calcification.    BOWEL: Distended, redundant sigmoid colon with fecal retention.  No bowel obstruction.   Appendix not visualized on this exam.  PERITONEUM: No ascites.  No localized intra-abdominal fluid collection or pneumoperitoneum noted.    VESSELS: Atherosclerotic calcification, abdominal aorta is normal in caliber.  RETROPERITONEUM/LYMPH NODES: Para-aortic and aortocaval lymph nodes, measuring up to 10 mm in short axis.  ABDOMINAL WALL: Umbilical hernia containing fat and small amount of nonobstructed small bowel.    BONES:  Degenerative changes spine.  Mild age indeterminate compression deformity superior endplate of L2.    IMPRESSION:    Limited evaluation of the subsegmental pulmonary vasculature as discussed otherwise, no acute pulmonary embolism noted.    Findings likely reflecting pulmonary edema.  More confluent airspace consolidation right upper and middle lobes, cannot exclude superimposed infection/pneumonia.    Cholelithiasis with suggestion of choledocholithiasis with small calcification distal CBD.    Mild bilateral hydroureteronephrosis likely secondary to marked distention of the urinary bladder.  Correlate for bladder outlet obstruction.    Fecal retention with distended redundant sigmoid colon.    Other findings as discussed above.    < end of copied text >            Radiology: all available radiological tests reviewed    Advanced directives addressed: full resuscitation

## 2021-02-21 LAB
ANION GAP SERPL CALC-SCNC: 7 MMOL/L — SIGNIFICANT CHANGE UP (ref 5–17)
BASOPHILS # BLD AUTO: 0.03 K/UL — SIGNIFICANT CHANGE UP (ref 0–0.2)
BASOPHILS NFR BLD AUTO: 0.5 % — SIGNIFICANT CHANGE UP (ref 0–2)
BUN SERPL-MCNC: 17 MG/DL — SIGNIFICANT CHANGE UP (ref 7–23)
CALCIUM SERPL-MCNC: 9 MG/DL — SIGNIFICANT CHANGE UP (ref 8.5–10.1)
CHLORIDE SERPL-SCNC: 99 MMOL/L — SIGNIFICANT CHANGE UP (ref 96–108)
CO2 SERPL-SCNC: 27 MMOL/L — SIGNIFICANT CHANGE UP (ref 22–31)
CREAT SERPL-MCNC: 0.74 MG/DL — SIGNIFICANT CHANGE UP (ref 0.5–1.3)
CULTURE RESULTS: SIGNIFICANT CHANGE UP
CULTURE RESULTS: SIGNIFICANT CHANGE UP
EOSINOPHIL # BLD AUTO: 0.14 K/UL — SIGNIFICANT CHANGE UP (ref 0–0.5)
EOSINOPHIL NFR BLD AUTO: 2.2 % — SIGNIFICANT CHANGE UP (ref 0–6)
GLUCOSE SERPL-MCNC: 260 MG/DL — HIGH (ref 70–99)
HCT VFR BLD CALC: 33.8 % — LOW (ref 34.5–45)
HGB BLD-MCNC: 11 G/DL — LOW (ref 11.5–15.5)
IMM GRANULOCYTES NFR BLD AUTO: 0.6 % — SIGNIFICANT CHANGE UP (ref 0–1.5)
LYMPHOCYTES # BLD AUTO: 0.85 K/UL — LOW (ref 1–3.3)
LYMPHOCYTES # BLD AUTO: 13.1 % — SIGNIFICANT CHANGE UP (ref 13–44)
MCHC RBC-ENTMCNC: 29.6 PG — SIGNIFICANT CHANGE UP (ref 27–34)
MCHC RBC-ENTMCNC: 32.5 GM/DL — SIGNIFICANT CHANGE UP (ref 32–36)
MCV RBC AUTO: 91.1 FL — SIGNIFICANT CHANGE UP (ref 80–100)
MONOCYTES # BLD AUTO: 0.57 K/UL — SIGNIFICANT CHANGE UP (ref 0–0.9)
MONOCYTES NFR BLD AUTO: 8.8 % — SIGNIFICANT CHANGE UP (ref 2–14)
NEUTROPHILS # BLD AUTO: 4.84 K/UL — SIGNIFICANT CHANGE UP (ref 1.8–7.4)
NEUTROPHILS NFR BLD AUTO: 74.8 % — SIGNIFICANT CHANGE UP (ref 43–77)
PLATELET # BLD AUTO: 166 K/UL — SIGNIFICANT CHANGE UP (ref 150–400)
POTASSIUM SERPL-MCNC: 4.4 MMOL/L — SIGNIFICANT CHANGE UP (ref 3.5–5.3)
POTASSIUM SERPL-SCNC: 4.4 MMOL/L — SIGNIFICANT CHANGE UP (ref 3.5–5.3)
RBC # BLD: 3.71 M/UL — LOW (ref 3.8–5.2)
RBC # FLD: 14.8 % — HIGH (ref 10.3–14.5)
SODIUM SERPL-SCNC: 133 MMOL/L — LOW (ref 135–145)
SPECIMEN SOURCE: SIGNIFICANT CHANGE UP
SPECIMEN SOURCE: SIGNIFICANT CHANGE UP
WBC # BLD: 6.47 K/UL — SIGNIFICANT CHANGE UP (ref 3.8–10.5)
WBC # FLD AUTO: 6.47 K/UL — SIGNIFICANT CHANGE UP (ref 3.8–10.5)

## 2021-02-21 PROCEDURE — 99232 SBSQ HOSP IP/OBS MODERATE 35: CPT

## 2021-02-21 RX ORDER — INSULIN GLARGINE 100 [IU]/ML
30 INJECTION, SOLUTION SUBCUTANEOUS ONCE
Refills: 0 | Status: COMPLETED | OUTPATIENT
Start: 2021-02-21 | End: 2021-02-21

## 2021-02-21 RX ORDER — INSULIN GLARGINE 100 [IU]/ML
77 INJECTION, SOLUTION SUBCUTANEOUS AT BEDTIME
Refills: 0 | Status: DISCONTINUED | OUTPATIENT
Start: 2021-02-21 | End: 2021-02-22

## 2021-02-21 RX ADMIN — LOSARTAN POTASSIUM 100 MILLIGRAM(S): 100 TABLET, FILM COATED ORAL at 10:43

## 2021-02-21 RX ADMIN — GABAPENTIN 300 MILLIGRAM(S): 400 CAPSULE ORAL at 05:55

## 2021-02-21 RX ADMIN — GABAPENTIN 300 MILLIGRAM(S): 400 CAPSULE ORAL at 13:47

## 2021-02-21 RX ADMIN — POLYETHYLENE GLYCOL 3350 17 GRAM(S): 17 POWDER, FOR SOLUTION ORAL at 20:43

## 2021-02-21 RX ADMIN — Medication 10 MILLIGRAM(S): at 20:44

## 2021-02-21 RX ADMIN — PANTOPRAZOLE SODIUM 40 MILLIGRAM(S): 20 TABLET, DELAYED RELEASE ORAL at 10:43

## 2021-02-21 RX ADMIN — Medication 10 MILLIGRAM(S): at 13:47

## 2021-02-21 RX ADMIN — INSULIN GLARGINE 30 UNIT(S): 100 INJECTION, SOLUTION SUBCUTANEOUS at 21:04

## 2021-02-21 RX ADMIN — Medication 4: at 17:47

## 2021-02-21 RX ADMIN — Medication 1 TABLET(S): at 10:43

## 2021-02-21 RX ADMIN — ENOXAPARIN SODIUM 40 MILLIGRAM(S): 100 INJECTION SUBCUTANEOUS at 10:42

## 2021-02-21 RX ADMIN — GABAPENTIN 300 MILLIGRAM(S): 400 CAPSULE ORAL at 20:44

## 2021-02-21 RX ADMIN — SODIUM CHLORIDE 1 GRAM(S): 9 INJECTION INTRAMUSCULAR; INTRAVENOUS; SUBCUTANEOUS at 10:43

## 2021-02-21 RX ADMIN — Medication 50 MILLIGRAM(S): at 20:44

## 2021-02-21 RX ADMIN — SODIUM CHLORIDE 1 GRAM(S): 9 INJECTION INTRAMUSCULAR; INTRAVENOUS; SUBCUTANEOUS at 20:44

## 2021-02-21 RX ADMIN — SENNA PLUS 2 TABLET(S): 8.6 TABLET ORAL at 20:43

## 2021-02-21 RX ADMIN — Medication 10 MILLIGRAM(S): at 05:55

## 2021-02-21 RX ADMIN — Medication 50 MILLIGRAM(S): at 10:43

## 2021-02-21 RX ADMIN — AZITHROMYCIN 500 MILLIGRAM(S): 500 TABLET, FILM COATED ORAL at 10:43

## 2021-02-21 RX ADMIN — ATORVASTATIN CALCIUM 20 MILLIGRAM(S): 80 TABLET, FILM COATED ORAL at 20:43

## 2021-02-21 RX ADMIN — Medication 81 MILLIGRAM(S): at 10:43

## 2021-02-21 RX ADMIN — Medication 6: at 08:17

## 2021-02-21 RX ADMIN — Medication 2: at 12:11

## 2021-02-21 RX ADMIN — CEFTRIAXONE 1000 MILLIGRAM(S): 500 INJECTION, POWDER, FOR SOLUTION INTRAMUSCULAR; INTRAVENOUS at 17:47

## 2021-02-21 RX ADMIN — TAMSULOSIN HYDROCHLORIDE 0.4 MILLIGRAM(S): 0.4 CAPSULE ORAL at 20:43

## 2021-02-21 NOTE — PROGRESS NOTE ADULT - ASSESSMENT
PROBLEMS;    Acute hypoxic resp failure  B/l pulmonary infiltrates- multifocal pneumonia- psoitive covid abx  UTI  H/o CVA  Diabetes  Hypercalcemia- on Ca supplement  Chr hyponatremia-ON NaCl tab at home  HTN      PLAN:    pulmonary stable-decd planning  Titrate O2 to keep sat >94%  High suspicion for COVID-swab is negative  High D-dimer-CTA chest NEG  IV Rocephin+Zithro   ID eval   Albuterol MDI  Lantus +ISS-BGM-d/w staff  DVT PROPHYLASIX

## 2021-02-21 NOTE — PROGRESS NOTE ADULT - ASSESSMENT
#B/l pulmonary infiltrates- multifocal pneumonia  #Acute hypoxic resp failure  Titrate O2 to keep sat >94% on 2L  High suspicion for COVID, but swab is negative  Antibody positive  No PE  Continue IV Rocephin+Zithro  #7for CAP, on 2L  daughter wants her to be off o2 before discharge home   ID eval appreciated  Pulm eval DR Thompson appreciated  Albuterol MDI  Elevated lactate- doubt sepsis, WBC wnl and hemodynamics are stable    acute on chronic anemia monitor   iron studies indicative of mixed inflammatory vs ? iron deficiency-retic count 2.8  no overt signs of bleed  if hb remains stable over next 24 to 48 hrs then dc home with home care     Choledocholithiasis-asymptomatic  Discussed with Dr Lewis-follow up with him as an outpatient    AUR on admission  per urology pt to go home with bruce leg bag     #H/o CVA  Cont Aspirin, statin    #Diabetes  Lantus +ISS, tirrate up  Hold oral hypoglycemic meds    #Hypercalcemia- resolved    #Chr hyponatremia  ON NaCl tab at home- will increase to BID and monitor BMP    #HTN  Resume home meds  will add norvasc   Adjust further if remains elevated    #COVID neg    #DVT proph- Lovenox     increased lantus  , check O2 jordan    #B/l pulmonary infiltrates- multifocal pneumonia  #Acute hypoxic resp failure  Titrate O2 to keep sat >94% on 2L  High suspicion for COVID, but swab is negative  Antibody positive  No PE  Continue IV Rocephin+Zithro  #7for CAP, on 2L  daughter wants her to be off o2 before discharge home   ID eval appreciated  Pulm eval DR Thompson appreciated  Albuterol MDI  Elevated lactate- doubt sepsis, WBC wnl and hemodynamics are stable    acute on chronic anemia monitor   iron studies indicative of mixed inflammatory vs ? iron deficiency-retic count 2.8  no overt signs of bleed  if hb remains stable   dc home with home care     Choledocholithiasis-asymptomatic  Discussed with Dr Lewis-follow up with him as an outpatient    AUR on admission  per urology pt to go home with bruce leg bag     #H/o CVA  Cont Aspirin, statin    #Diabetes  Lantus +ISS, tirrate up  Hold oral hypoglycemic meds    #Hypercalcemia- resolved    #Chr hyponatremia  ON NaCl tab at home- will increase to BID and monitor BMP    #HTN  Resume home meds  will add norvasc   Adjust further if remains elevated    #COVID neg    #DVT proph- Lovenox     increased lantus  , check O2 jordan    #B/l pulmonary infiltrates- multifocal pneumonia  #Acute hypoxic resp failure  Titrate O2 to keep sat >94% on 2L  High suspicion for COVID, but swab is negative  Antibody positive  No PE  Continue IV Rocephin#6+Zithro  #5for CAP, on 2L  daughter wants her to be off o2 before discharge home   ID eval appreciated  Pulm eval DR Thompson appreciated  Albuterol MDI  Elevated lactate- doubt sepsis, WBC wnl and hemodynamics are stable    acute on chronic anemia monitor   iron studies indicative of mixed inflammatory vs ? iron deficiency-retic count 2.8  no overt signs of bleed  if hb remains stable   dc home with home care     Choledocholithiasis-asymptomatic  Discussed with Dr Lewis-follow up with him as an outpatient    AUR on admission  per urology pt to go home with bruce leg bag     #H/o CVA  Cont Aspirin, statin    #Diabetes  Lantus +ISS, tirrate up  Hold oral hypoglycemic meds    #Hypercalcemia- resolved    #Chr hyponatremia  ON NaCl tab at home- will increase to BID and monitor BMP    #HTN  Resume home meds  will add norvasc   Adjust further if remains elevated    #COVID neg    #DVT proph- Lovenox         #B/l pulmonary infiltrates- multifocal pneumonia  #Acute hypoxic resp failure  Titrate O2 to keep sat >94% on 2L  High suspicion for COVID, but swab is negative  Antibody positive  No PE  Continue IV Rocephin#6/7+ completed Zithro  #5for CAP, on 2L  daughter wants her to be off O2 before discharge   plan to walk with PT in am with walker  and wean off o2 today and see if she can tolerate ambulation without O2   ID eval appreciated  Pulm eval DR Thompson appreciated  Albuterol MDI  Elevated lactate- doubt sepsis, WBC wnl and hemodynamics are stable    acute on chronic anemia monitor   iron studies indicative of mixed inflammatory vs iron deficiency-retic count 2.8  no overt signs of bleed  monitor Hb for stability       Choledocholithiasis-asymptomatic  Discussed with Dr Lewis-follow up with him as an outpatient    AUR on admission  per urology pt to go home with bruce leg bag , f/u with dr heller as outpatient     #H/o CVA  Cont Aspirin, statin    #Diabetes  Lantus +ISS,titrated up lantus today 2/21  Hold oral hypoglycemic meds    #Hypercalcemia- resolved    #Chr hyponatremia  ON NaCl tab at home-  increased to BID and monitor BMP    #HTN  Resume home meds  will add norvasc   Adjust further if remains elevated    #COVID neg    #DVT proph- Lovenox      discharge home with home care , once weaned off O2 and if hb stable   I discussed with daughter

## 2021-02-21 NOTE — PROGRESS NOTE ADULT - SUBJECTIVE AND OBJECTIVE BOX
71yo/F with PMH CVA about 8 years ago on Aspirin/statin, chronic hyponatremia, diabetes w/diabetic neuropathy presented for evaluation of worsening SOB. Patient unable to provide clear history, mainly obtained from daughter over the phone. Per daughter, she was OK yesterday, but woke up today SOB and they send her in to the hospital.        02/17/21: Patient seen and examined. Still with sob. Discussed with patient regarding management and d/c plan.   Discussed with her daughter and son in law regarding management plan.   02/18/21: Patient seen and examined. Feels better today. Discussed with daughter  2/19-has pain at bruce site , will try voiding trial no abd pain, feels better other wise , still has generalised weaknes  2/20 feels better today, sitting in chair   2/21  feels better   Constitutional: NAD  HEENT: Atraumatic, BENIGNO, Normal, No congestion  Respiratory: Breath Sounds normal, no rhonchi/wheeze  Cardiovascular: N S1S2;   Gastrointestinal: Abdomen soft, non tender, Bowel Ssounds present  Extremities: No edema, peripheral pulses present  Neurological: AAO x 3, no gross focal motor deficits      PHYSICAL EXAM:    Daily     Daily     ICU Vital Signs Last 24 Hrs  T(C): 36.4 (21 Feb 2021 15:57), Max: 37.1 (21 Feb 2021 00:10)  T(F): 97.5 (21 Feb 2021 15:57), Max: 98.8 (21 Feb 2021 00:10)  HR: 62 (21 Feb 2021 15:57) (62 - 73)  BP: 127/44 (21 Feb 2021 15:57) (108/55 - 162/58)  BP(mean): --  ABP: --  ABP(mean): --  RR: 18 (21 Feb 2021 15:57) (16 - 18)  SpO2: 100% (21 Feb 2021 15:57) (98% - 100%)                                11.0   6.47  )-----------( 166      ( 21 Feb 2021 07:59 )             33.8       CBC Full  -  ( 21 Feb 2021 07:59 )  WBC Count : 6.47 K/uL  RBC Count : 3.71 M/uL  Hemoglobin : 11.0 g/dL  Hematocrit : 33.8 %  Platelet Count - Automated : 166 K/uL  Mean Cell Volume : 91.1 fl  Mean Cell Hemoglobin : 29.6 pg  Mean Cell Hemoglobin Concentration : 32.5 gm/dL  Auto Neutrophil # : 4.84 K/uL  Auto Lymphocyte # : 0.85 K/uL  Auto Monocyte # : 0.57 K/uL  Auto Eosinophil # : 0.14 K/uL  Auto Basophil # : 0.03 K/uL  Auto Neutrophil % : 74.8 %  Auto Lymphocyte % : 13.1 %  Auto Monocyte % : 8.8 %  Auto Eosinophil % : 2.2 %  Auto Basophil % : 0.5 %      02-21    133<L>  |  99  |  17  ----------------------------<  260<H>  4.4   |  27  |  0.74    Ca    9.0      21 Feb 2021 07:59    TPro  6.4  /  Alb  2.8<L>  /  TBili  0.6  /  DBili  0.2  /  AST  59<H>  /  ALT  193<H>  /  AlkPhos  130<H>  02-20      LIVER FUNCTIONS - ( 20 Feb 2021 07:20 )  Alb: 2.8 g/dL / Pro: 6.4 gm/dL / ALK PHOS: 130 U/L / ALT: 193 U/L / AST: 59 U/L / GGT: x                               MEDICATIONS  (STANDING):  aspirin enteric coated 81 milliGRAM(s) Oral daily  atorvastatin 20 milliGRAM(s) Oral at bedtime  azithromycin   Tablet 500 milliGRAM(s) Oral daily  bethanechol 50 milliGRAM(s) Oral two times a day  cefTRIAXone Injectable. 1000 milliGRAM(s) IV Push every 24 hours  cefTRIAXone Injectable.      dextrose 40% Gel 15 Gram(s) Oral once  dextrose 5%. 1000 milliLiter(s) (50 mL/Hr) IV Continuous <Continuous>  dextrose 5%. 1000 milliLiter(s) (100 mL/Hr) IV Continuous <Continuous>  dextrose 50% Injectable 25 Gram(s) IV Push once  dextrose 50% Injectable 12.5 Gram(s) IV Push once  dextrose 50% Injectable 25 Gram(s) IV Push once  enoxaparin Injectable 40 milliGRAM(s) SubCutaneous daily  gabapentin 300 milliGRAM(s) Oral three times a day  glucagon  Injectable 1 milliGRAM(s) IntraMuscular once  hydrALAZINE 10 milliGRAM(s) Oral every 8 hours  insulin glargine Injectable (LANTUS) 74 Unit(s) SubCutaneous at bedtime  insulin lispro (ADMELOG) corrective regimen sliding scale   SubCutaneous three times a day before meals  insulin lispro (ADMELOG) corrective regimen sliding scale   SubCutaneous at bedtime  losartan 100 milliGRAM(s) Oral daily  multivitamin 1 Tablet(s) Oral daily  pantoprazole    Tablet 40 milliGRAM(s) Oral daily  polyethylene glycol 3350 17 Gram(s) Oral every 12 hours  senna 2 Tablet(s) Oral at bedtime  sodium chloride 1 Gram(s) Oral two times a day  tamsulosin 0.4 milliGRAM(s) Oral at bedtime       69yo/F with PMH CVA about 8 years ago on Aspirin/statin, chronic hyponatremia, diabetes w/diabetic neuropathy presented for evaluation of worsening SOB. Patient unable to provide clear history, mainly obtained from daughter over the phone. Per daughter, she was OK yesterday, but woke up today SOB and they send her in to the hospital.        02/17/21: Patient seen and examined. Still with sob. Discussed with patient regarding management and d/c plan.   Discussed with her daughter and son in law regarding management plan.   02/18/21: Patient seen and examined. Feels better today. Discussed with daughter  2/19-has pain at bruce site , will try voiding trial no abd pain, feels better other wise , still has generalised weaknes  2/20 feels better today, sitting in chair   2/21  feels better , no sob , still on 2 l NC  Constitutional: NAD  HEENT: Atraumatic, BENIGNO, Normal, No congestion  Respiratory: Breath Sounds normal, no rhonchi/wheeze  Cardiovascular: N S1S2;   Gastrointestinal: Abdomen soft, non tender, Bowel Ssounds present  Extremities: No edema, peripheral pulses present  Neurological: AAO x 3, no gross focal motor deficits      PHYSICAL EXAM:    Daily     Daily     ICU Vital Signs Last 24 Hrs  T(C): 36.4 (21 Feb 2021 15:57), Max: 37.1 (21 Feb 2021 00:10)  T(F): 97.5 (21 Feb 2021 15:57), Max: 98.8 (21 Feb 2021 00:10)  HR: 62 (21 Feb 2021 15:57) (62 - 73)  BP: 127/44 (21 Feb 2021 15:57) (108/55 - 162/58)  BP(mean): --  ABP: --  ABP(mean): --  RR: 18 (21 Feb 2021 15:57) (16 - 18)  SpO2: 100% (21 Feb 2021 15:57) (98% - 100%)                                11.0   6.47  )-----------( 166      ( 21 Feb 2021 07:59 )             33.8       CBC Full  -  ( 21 Feb 2021 07:59 )  WBC Count : 6.47 K/uL  RBC Count : 3.71 M/uL  Hemoglobin : 11.0 g/dL  Hematocrit : 33.8 %  Platelet Count - Automated : 166 K/uL  Mean Cell Volume : 91.1 fl  Mean Cell Hemoglobin : 29.6 pg  Mean Cell Hemoglobin Concentration : 32.5 gm/dL  Auto Neutrophil # : 4.84 K/uL  Auto Lymphocyte # : 0.85 K/uL  Auto Monocyte # : 0.57 K/uL  Auto Eosinophil # : 0.14 K/uL  Auto Basophil # : 0.03 K/uL  Auto Neutrophil % : 74.8 %  Auto Lymphocyte % : 13.1 %  Auto Monocyte % : 8.8 %  Auto Eosinophil % : 2.2 %  Auto Basophil % : 0.5 %      02-21    133<L>  |  99  |  17  ----------------------------<  260<H>  4.4   |  27  |  0.74    Ca    9.0      21 Feb 2021 07:59    TPro  6.4  /  Alb  2.8<L>  /  TBili  0.6  /  DBili  0.2  /  AST  59<H>  /  ALT  193<H>  /  AlkPhos  130<H>  02-20      LIVER FUNCTIONS - ( 20 Feb 2021 07:20 )  Alb: 2.8 g/dL / Pro: 6.4 gm/dL / ALK PHOS: 130 U/L / ALT: 193 U/L / AST: 59 U/L / GGT: x                               MEDICATIONS  (STANDING):  aspirin enteric coated 81 milliGRAM(s) Oral daily  atorvastatin 20 milliGRAM(s) Oral at bedtime  azithromycin   Tablet 500 milliGRAM(s) Oral daily  bethanechol 50 milliGRAM(s) Oral two times a day  cefTRIAXone Injectable. 1000 milliGRAM(s) IV Push every 24 hours  cefTRIAXone Injectable.      dextrose 40% Gel 15 Gram(s) Oral once  dextrose 5%. 1000 milliLiter(s) (50 mL/Hr) IV Continuous <Continuous>  dextrose 5%. 1000 milliLiter(s) (100 mL/Hr) IV Continuous <Continuous>  dextrose 50% Injectable 25 Gram(s) IV Push once  dextrose 50% Injectable 12.5 Gram(s) IV Push once  dextrose 50% Injectable 25 Gram(s) IV Push once  enoxaparin Injectable 40 milliGRAM(s) SubCutaneous daily  gabapentin 300 milliGRAM(s) Oral three times a day  glucagon  Injectable 1 milliGRAM(s) IntraMuscular once  hydrALAZINE 10 milliGRAM(s) Oral every 8 hours  insulin glargine Injectable (LANTUS) 74 Unit(s) SubCutaneous at bedtime  insulin lispro (ADMELOG) corrective regimen sliding scale   SubCutaneous three times a day before meals  insulin lispro (ADMELOG) corrective regimen sliding scale   SubCutaneous at bedtime  losartan 100 milliGRAM(s) Oral daily  multivitamin 1 Tablet(s) Oral daily  pantoprazole    Tablet 40 milliGRAM(s) Oral daily  polyethylene glycol 3350 17 Gram(s) Oral every 12 hours  senna 2 Tablet(s) Oral at bedtime  sodium chloride 1 Gram(s) Oral two times a day  tamsulosin 0.4 milliGRAM(s) Oral at bedtime

## 2021-02-21 NOTE — PROGRESS NOTE ADULT - SUBJECTIVE AND OBJECTIVE BOX
Subjective:    pat lying in bed, no new complaint, concern about running high BS.    Home Medications:  Aspirin Enteric Coated 81 mg oral delayed release tablet: 1 tab(s) orally once a day (16 Feb 2021 15:32)  atorvastatin 20 mg oral tablet: 1 tab(s) orally once a day (16 Feb 2021 15:32)  Caltrate 600 mg oral tablet: 1 tab(s) orally 2 times a day (16 Feb 2021 15:32)  cyanocobalamin 1000 mcg oral tablet: 2 tab(s) orally once a day (16 Feb 2021 15:32)  gabapentin 300 mg oral capsule: 1 cap(s) orally 3 times a day (16 Feb 2021 15:32)  Januvia 100 mg oral tablet: 1 tab(s) orally once a day (16 Feb 2021 15:32)  Levemir 100 units/mL subcutaneous solution: 68 unit(s) subcutaneously once a day (at bedtime) (16 Feb 2021 15:32)  losartan 100 mg oral tablet: 1 tab(s) orally once a day (16 Feb 2021 15:32)  metFORMIN 1000 mg oral tablet: 1 tab(s) orally 2 times a day (16 Feb 2021 15:32)  Multiple Vitamins oral tablet: 1 tab(s) orally once a day (16 Feb 2021 15:32)  pantoprazole 20 mg oral delayed release tablet: 1 tab(s) orally once a day (16 Feb 2021 15:32)  senna 8.6 mg oral tablet: 2 tab(s) orally once a day (at bedtime) (16 Feb 2021 15:32)  sodium chloride 1 g oral tablet: 1 tab(s) orally once a day (16 Feb 2021 15:38)  solifenacin 10 mg oral tablet: 1 tab(s) orally once a day (16 Feb 2021 15:32)  Vitamin B1 100 mg oral tablet: 1 tab(s) orally once a day (16 Feb 2021 15:32)  Vitamin B6 50 mg oral tablet: 1 tab(s) orally once a day (16 Feb 2021 15:32)    MEDICATIONS  (STANDING):  aspirin enteric coated 81 milliGRAM(s) Oral daily  atorvastatin 20 milliGRAM(s) Oral at bedtime  azithromycin   Tablet 500 milliGRAM(s) Oral daily  bethanechol 50 milliGRAM(s) Oral two times a day  cefTRIAXone Injectable. 1000 milliGRAM(s) IV Push every 24 hours  cefTRIAXone Injectable.      dextrose 40% Gel 15 Gram(s) Oral once  dextrose 5%. 1000 milliLiter(s) (50 mL/Hr) IV Continuous <Continuous>  dextrose 5%. 1000 milliLiter(s) (100 mL/Hr) IV Continuous <Continuous>  dextrose 50% Injectable 25 Gram(s) IV Push once  dextrose 50% Injectable 12.5 Gram(s) IV Push once  dextrose 50% Injectable 25 Gram(s) IV Push once  enoxaparin Injectable 40 milliGRAM(s) SubCutaneous daily  gabapentin 300 milliGRAM(s) Oral three times a day  glucagon  Injectable 1 milliGRAM(s) IntraMuscular once  hydrALAZINE 10 milliGRAM(s) Oral every 8 hours  insulin glargine Injectable (LANTUS) 74 Unit(s) SubCutaneous at bedtime  insulin lispro (ADMELOG) corrective regimen sliding scale   SubCutaneous three times a day before meals  insulin lispro (ADMELOG) corrective regimen sliding scale   SubCutaneous at bedtime  losartan 100 milliGRAM(s) Oral daily  multivitamin 1 Tablet(s) Oral daily  pantoprazole    Tablet 40 milliGRAM(s) Oral daily  polyethylene glycol 3350 17 Gram(s) Oral every 12 hours  senna 2 Tablet(s) Oral at bedtime  sodium chloride 1 Gram(s) Oral two times a day  tamsulosin 0.4 milliGRAM(s) Oral at bedtime    MEDICATIONS  (PRN):  acetaminophen   Tablet .. 650 milliGRAM(s) Oral every 4 hours PRN Mild Pain (1 - 3)  aluminum hydroxide/magnesium hydroxide/simethicone Suspension 30 milliLiter(s) Oral every 4 hours PRN Dyspepsia  amLODIPine   Tablet 5 milliGRAM(s) Oral daily PRN for SBP>150  ondansetron Injectable 4 milliGRAM(s) IV Push every 6 hours PRN Nausea      Allergies    No Known Allergies    Intolerances        Vital Signs Last 24 Hrs  T(C): 36.4 (21 Feb 2021 08:23), Max: 37.2 (20 Feb 2021 16:10)  T(F): 97.6 (21 Feb 2021 08:23), Max: 98.9 (20 Feb 2021 16:10)  HR: 67 (21 Feb 2021 08:23) (66 - 72)  BP: 108/55 (21 Feb 2021 08:23) (108/55 - 171/50)  BP(mean): --  RR: 16 (21 Feb 2021 08:23) (16 - 16)  SpO2: 99% (21 Feb 2021 08:23) (99% - 100%)      PHYSICAL EXAMINATION:    NECK:  Supple. No lymphadenopathy. Jugular venous pressure not elevated. Carotids equal.   HEART:   The cardiac impulse has a normal quality. Reg., Nl S1 and S2.  There are no murmurs, rubs or gallops noted  CHEST:  Chest crackles to auscultation. Normal respiratory effort.  ABDOMEN:  Soft and nontender.   EXTREMITIES:  There is no edema.       LABS:                        11.0   6.47  )-----------( 166      ( 21 Feb 2021 07:59 )             33.8     02-21    133<L>  |  99  |  17  ----------------------------<  260<H>  4.4   |  27  |  0.74    Ca    9.0      21 Feb 2021 07:59    TPro  6.4  /  Alb  2.8<L>  /  TBili  0.6  /  DBili  0.2  /  AST  59<H>  /  ALT  193<H>  /  AlkPhos  130<H>  02-20

## 2021-02-22 ENCOUNTER — TRANSCRIPTION ENCOUNTER (OUTPATIENT)
Age: 71
End: 2021-02-22

## 2021-02-22 VITALS — OXYGEN SATURATION: 98 % | RESPIRATION RATE: 18 BRPM

## 2021-02-22 LAB
ANION GAP SERPL CALC-SCNC: 8 MMOL/L — SIGNIFICANT CHANGE UP (ref 5–17)
BUN SERPL-MCNC: 18 MG/DL — SIGNIFICANT CHANGE UP (ref 7–23)
CALCIUM SERPL-MCNC: 9.5 MG/DL — SIGNIFICANT CHANGE UP (ref 8.5–10.1)
CHLORIDE SERPL-SCNC: 100 MMOL/L — SIGNIFICANT CHANGE UP (ref 96–108)
CO2 SERPL-SCNC: 25 MMOL/L — SIGNIFICANT CHANGE UP (ref 22–31)
CREAT SERPL-MCNC: 0.68 MG/DL — SIGNIFICANT CHANGE UP (ref 0.5–1.3)
GLUCOSE SERPL-MCNC: 234 MG/DL — HIGH (ref 70–99)
POTASSIUM SERPL-MCNC: 4.6 MMOL/L — SIGNIFICANT CHANGE UP (ref 3.5–5.3)
POTASSIUM SERPL-SCNC: 4.6 MMOL/L — SIGNIFICANT CHANGE UP (ref 3.5–5.3)
SODIUM SERPL-SCNC: 133 MMOL/L — LOW (ref 135–145)

## 2021-02-22 PROCEDURE — 99239 HOSP IP/OBS DSCHRG MGMT >30: CPT

## 2021-02-22 RX ORDER — AMLODIPINE BESYLATE 2.5 MG/1
1 TABLET ORAL
Qty: 30 | Refills: 0
Start: 2021-02-22 | End: 2021-03-23

## 2021-02-22 RX ORDER — TAMSULOSIN HYDROCHLORIDE 0.4 MG/1
1 CAPSULE ORAL
Qty: 30 | Refills: 0
Start: 2021-02-22

## 2021-02-22 RX ORDER — SODIUM CHLORIDE 9 MG/ML
1 INJECTION INTRAMUSCULAR; INTRAVENOUS; SUBCUTANEOUS
Qty: 0 | Refills: 0 | DISCHARGE
Start: 2021-02-22

## 2021-02-22 RX ORDER — SODIUM CHLORIDE 9 MG/ML
1 INJECTION INTRAMUSCULAR; INTRAVENOUS; SUBCUTANEOUS
Qty: 0 | Refills: 0 | DISCHARGE

## 2021-02-22 RX ADMIN — LOSARTAN POTASSIUM 100 MILLIGRAM(S): 100 TABLET, FILM COATED ORAL at 10:12

## 2021-02-22 RX ADMIN — Medication 10 MILLIGRAM(S): at 05:25

## 2021-02-22 RX ADMIN — Medication 50 MILLIGRAM(S): at 10:12

## 2021-02-22 RX ADMIN — PANTOPRAZOLE SODIUM 40 MILLIGRAM(S): 20 TABLET, DELAYED RELEASE ORAL at 10:11

## 2021-02-22 RX ADMIN — Medication 10 MILLIGRAM(S): at 13:10

## 2021-02-22 RX ADMIN — GABAPENTIN 300 MILLIGRAM(S): 400 CAPSULE ORAL at 13:10

## 2021-02-22 RX ADMIN — Medication 1 TABLET(S): at 10:11

## 2021-02-22 RX ADMIN — ENOXAPARIN SODIUM 40 MILLIGRAM(S): 100 INJECTION SUBCUTANEOUS at 10:12

## 2021-02-22 RX ADMIN — POLYETHYLENE GLYCOL 3350 17 GRAM(S): 17 POWDER, FOR SOLUTION ORAL at 10:12

## 2021-02-22 RX ADMIN — Medication 81 MILLIGRAM(S): at 10:11

## 2021-02-22 RX ADMIN — Medication 4: at 13:09

## 2021-02-22 RX ADMIN — Medication 4: at 09:12

## 2021-02-22 RX ADMIN — GABAPENTIN 300 MILLIGRAM(S): 400 CAPSULE ORAL at 05:25

## 2021-02-22 RX ADMIN — SODIUM CHLORIDE 1 GRAM(S): 9 INJECTION INTRAMUSCULAR; INTRAVENOUS; SUBCUTANEOUS at 10:12

## 2021-02-22 NOTE — DISCHARGE NOTE PROVIDER - CARE PROVIDERS DIRECT ADDRESSES
2020         RE: Margaret Jenkins  [address] [id number]        Dear Colleague,    Thank you for referring your patient, Margaret Jenkins, to the [facility]. Please see a copy of my visit note below.                Follow Up Notes on Referred Patient    Date: 2021       Dr. Vivien Davidson MD  No address on file       RE: Margaret Jenkins  : 1970  [place]: 2020    Dear Dr. Vivien Davidson:    Margaret Jenknis is a 50 year old woman with a diagnosis of pelvic mass and recurrent pleural effusions. No new symptoms since l talked to her last.      Past Medical History:    Past Medical History:   Diagnosis Date     Gastroesophageal reflux disease with esophagitis      Pelvic mass      Pleural effusion      Positive DANIEL (antinuclear antibody)      Vegetarian          Past Surgical History:    Past Surgical History:   Procedure Laterality Date     NO HISTORY OF SURGERY           Health Maintenance Due   Topic Date Due     HIV SCREENING  1987     HEPATITIS C SCREENING  1990     DTAP/TDAP/TD IMMUNIZATION (1 - Tdap) 1997     LIPID  2017     PREVENTIVE CARE VISIT  10/23/2020       Current Medications:     Current Outpatient Medications   Medication Sig Dispense Refill     ibuprofen 200 MG capsule Take 200 mg by mouth every 4 hours as needed       omeprazole (PRILOSEC) 10 MG DR capsule Take 10 mg by mouth every morning        oxyCODONE (ROXICODONE) 5 MG tablet Take 1 tablet (5 mg) by mouth every 6 hours as needed for pain 20 tablet 0     acetaminophen (TYLENOL) 500 MG tablet Take 500-1,000 mg by mouth every 6 hours as needed for mild pain       predniSONE (DELTASONE) 5 MG tablet Take 5 mg by mouth every morning            Allergies:        Allergies   Allergen Reactions     Codeine      Heart palpitations, and chest pain        Social History:     Social History     Tobacco Use     Smoking status: Never Smoker     Smokeless tobacco: Never Used  "  Substance Use Topics     Alcohol use: Not Currently     Comment: no alcohol for 20 years       History   Drug Use No         Family History:       Family History   Problem Relation Age of Onset     Diabetes Mother      Cancer Father         adenocarcinoma     Thyroid Disease Sister      Diabetes Sister      Other - See Comments Sister         PONV     Thyroid Disease Sister      Deep Vein Thrombosis Maternal Grandfather      Bladder Cancer Paternal Uncle      Deep Vein Thrombosis Paternal Uncle          Physical Exam:     /76 (BP Location: Right arm, Patient Position: Sitting, Cuff Size: Adult Regular)   Pulse 104   Temp 97  F (36.1  C)   Resp 18   Ht 1.651 m (5' 5\")   Wt 68.6 kg (151 lb 4.8 oz)   SpO2 100%   BMI 25.18 kg/m    Body mass index is 25.18 kg/m .    General Appearance: healthy and alert, no distress     Musculoskeletal: extremities non tender and without edema    Neurological: normal gait, no gross defects     Psychiatric: appropriate mood and affect                               Hematological: normal cervical, supraclavicular and inguinal lymph nodes     Gastrointestinal:       abdomen soft, non-tender, non-distended, no organomegaly or masses    Genitourinary: External genitalia and urethral meatus appears normal.  Vagina is smooth without nodularity or masses.  Cervix appears normal and without lesions.  Bimanual exam reveals mobile solid pelvic mass.  Recto-vaginal exam confirms these findings.      Assessment:    Margaret Jenkins is a 50 year old woman with a diagnosis of pelvic mass and recurrent pleural effusions.     A total of 40 minutes was spent with the patient, 30 minutes of which were spent in counseling the patient and/or treatment planning.      1. Recurrent none malignant pleural effusions   2. Complex pelvic mass with mural nodules   3.  of 62.7   4. CEA of 3.1   5. Current use of oral steroids   6. 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 We will plan to proceed with a EXLAP, BSO, possible staging and debulking. She will see my colleagues in anesthesia for optimization. She was consented for the Precision Medicine Program. She does agree with the plan all questions were answered.       Risks, benefits and alternatives to proceed discussed in detail with the patient. Risks include but are not limited to bleeding, infection, possible injury to surrounding organs including bowel, bladder, ureter, need for second procedure/surgery related to complications from first procedure, postoperative medical complications such as cardiopulmonary events, lymphedema, lymphocyst, thromboembolic events. Consent for surgery, blood transfusion signed.  Will arrange appropriate preoperative blood work, CXR, EKG. Patient also advised on need for postoperative surveillance and/or adjuvant therapy. Questions answered.          Tanvir Nichole MD, MS      Department of Obstetrics and Gynecology   Division of Gynecologic Oncology   [facility]   Phone: [phone]       CC  Patient Care Team:  Dina Nayak NP as PCP - General (Nurse Practitioner - Family)  Dina Nayak NP as Assigned PCP  SELF, REFERRED      Again, thank you for allowing me to participate in the care of your patient.        Sincerely,        Tanvir Nichole MD     ,DirectAddress_Unknown,DirectAddress_Unknown,DirectAddress_Unknown

## 2021-02-22 NOTE — DISCHARGE NOTE PROVIDER - HOSPITAL COURSE
· Subjective and Objective:   71yo/F with PMH CVA about 8 years ago on Aspirin/statin, chronic hyponatremia, diabetes w/diabetic neuropathy presented for evaluation of worsening SOB. Patient unable to provide clear history, mainly obtained from daughter over the phone. Per daughter, she was OK yesterday, but woke up today SOB and they send her in to the hospital.  pt admitted with multifocal pna with acute hypoxic resp failure.  pt completed course of ceftriaxone and zithro for CAP.  no evidence of PE on imaging      #B/l pulmonary infiltrates- multifocal pneumonia  #Acute hypoxic resp failure  does not require home oxygen  Continue IV Rocephin#6/7+ completed Zithro  #5for CAP  plan to walk with PT in am with walker  and wean off o2 today and see if she can tolerate ambulation without O2   NEO call appreciated  Pulm eval DR Thompson appreciated  Albuterol MDI    acute on chronic anemia monitor   iron studies indicative of mixed inflammatory vs iron deficiency-retic count 2.8  no overt signs of bleed  monitor Hb for stability     Choledocholithiasis-asymptomatic  Discussed with Dr Lewis-follow up with him as an outpatient    AUR on admission  per urology pt to go home with bruce leg bag , f/u with dr heller as outpatient     #H/o CVA  Cont Aspirin, statin    #Diabetes resume home meds      #Chr hyponatremia  ON NaCl tab at home-  increased to BID and monitor BMP    #HTN  Resume home meds  will add norvasc   Adjust further if remains elevated    #COVID neg    #DVT proph- Lovenox. none upon discharge given patient was not covid positive only PUI       discharge home with home care , once weaned off O2 and if hb stable   I discussed with daughter   total time spent 35 mins

## 2021-02-22 NOTE — DISCHARGE NOTE PROVIDER - NSDCCPCAREPLAN_GEN_ALL_CORE_FT
PRINCIPAL DISCHARGE DIAGNOSIS  Diagnosis: Bilateral pneumonia  Assessment and Plan of Treatment: .You were found to have pneumonia which is an infection in one or both of the lungs. It causes the air sacs of the lungs to fill up with fluid or pus. It can range from mild to severe, depending on the type of germ causing the infection, your age, and your overall health.  you have already completed your course of antibiotics.   Continue to stay hydrated. **Monitor for the following signs/symptoms: Fever > 101, chills, cough with phlegm, shortness of breath and nausea and/or vomiting. If you experience these signs/symptoms please alert your primary care provider or if your signs/symptoms are severe please return to the ED**      SECONDARY DISCHARGE DIAGNOSES  Diagnosis: Urinary retention  Assessment and Plan of Treatment: see dr heller in his office for further follow up  they will remove the bruce catheter in the office to see if it can be safely removed.  if any severe pain or leakage please report to the emergency room.    Diagnosis: Gallstones without obstruction of gallbladder  Assessment and Plan of Treatment: follow up with dr samaniego your gastroenterologist for close follow up  you will need further worup once your shortness of breath is resolved and cleared by your doctor.  if right side pain and/or nausea vomiting pelase return to the emergency room     low fat diet recommended    Diagnosis: Suspected 2019-nCoV infection  Assessment and Plan of Treatment: your covid test was negative   close follow up with your primary doctor within 1 week

## 2021-02-22 NOTE — PROGRESS NOTE ADULT - ASSESSMENT
PROBLEMS;    Acute hypoxic resp failure  B/l pulmonary infiltrates- multifocal pneumonia- psoitive covid abx  UTI  H/o CVA  Diabetes  Hypercalcemia- on Ca supplement  Chr hyponatremia-ON NaCl tab at home  HTN      PLAN:    pulmonary stable-decd planning- fu in office  Titrate O2 to keep sat >94%  High suspicion for COVID-swab is negative  High D-dimer-CTA chest NEG  IV Rocephin+Zithro   ID eval   Albuterol MDI  Lantus +ISS-BGM-d/w staff  DVT PROPHYLASIX

## 2021-02-22 NOTE — PROGRESS NOTE ADULT - PROVIDER SPECIALTY LIST ADULT
Hospitalist
Pulmonology
[FreeTextEntry1] : Called pt f/u s/p hospitalization, no answer, left voice mail.
Hospitalist
Infectious Disease
Infectious Disease
Pulmonology

## 2021-02-22 NOTE — DISCHARGE NOTE PROVIDER - CARE PROVIDER_API CALL
dr sana arshad - your primary care,   Phone: (   )    -  Fax: (   )    -  Follow Up Time: 1 week    Tim Hoyt)  Gastroenterology; Internal Medicine  [address] [place] [address]  Phone: [phone]  Fax: [phone]  Follow Up Time:     Leo Thompson)  Critical Care Medicine; Internal Medicine; Pulmonary Disease; Sleep Medicine  [address] [place] [address]  Phone: [phone]  Fax: [phone]  Follow Up Time:

## 2021-02-22 NOTE — DISCHARGE NOTE PROVIDER - NSDCMRMEDTOKEN_GEN_ALL_CORE_FT
amLODIPine 5 mg oral tablet: 1 tab(s) orally once a day, As Needed   Aspirin Enteric Coated 81 mg oral delayed release tablet: 1 tab(s) orally once a day  atorvastatin 20 mg oral tablet: 1 tab(s) orally once a day  Caltrate 600 mg oral tablet: 1 tab(s) orally 2 times a day  cyanocobalamin 1000 mcg oral tablet: 2 tab(s) orally once a day  gabapentin 300 mg oral capsule: 1 cap(s) orally 3 times a day  Januvia 100 mg oral tablet: 1 tab(s) orally once a day  Levemir 100 units/mL subcutaneous solution: 68 unit(s) subcutaneously once a day (at bedtime)  losartan 100 mg oral tablet: 1 tab(s) orally once a day  metFORMIN 1000 mg oral tablet: 1 tab(s) orally 2 times a day  Multiple Vitamins oral tablet: 1 tab(s) orally once a day  pantoprazole 20 mg oral delayed release tablet: 1 tab(s) orally once a day  senna 8.6 mg oral tablet: 2 tab(s) orally once a day (at bedtime)  sodium chloride 1 g oral tablet: 1 tab(s) orally 2 times a day  solifenacin 10 mg oral tablet: 1 tab(s) orally once a day  tamsulosin 0.4 mg oral capsule: 1 cap(s) orally once a day (at bedtime)  Vitamin B1 100 mg oral tablet: 1 tab(s) orally once a day  Vitamin B6 50 mg oral tablet: 1 tab(s) orally once a day

## 2021-02-22 NOTE — DISCHARGE NOTE NURSING/CASE MANAGEMENT/SOCIAL WORK - PATIENT PORTAL LINK FT
You can access the FollowMyHealth Patient Portal offered by [facility] by registering at the following website: [email or web address]. By joining [facility]’s FollowMyHealth portal, you will also be able to view your health information using other applications (apps) compatible with our system.

## 2021-02-22 NOTE — PROGRESS NOTE ADULT - SUBJECTIVE AND OBJECTIVE BOX
Subjective:    pat better, sitting in chair, no respiratory complaint, wants to go home.    Home Medications:  Aspirin Enteric Coated 81 mg oral delayed release tablet: 1 tab(s) orally once a day (16 Feb 2021 15:32)  atorvastatin 20 mg oral tablet: 1 tab(s) orally once a day (16 Feb 2021 15:32)  Caltrate 600 mg oral tablet: 1 tab(s) orally 2 times a day (16 Feb 2021 15:32)  cyanocobalamin 1000 mcg oral tablet: 2 tab(s) orally once a day (16 Feb 2021 15:32)  gabapentin 300 mg oral capsule: 1 cap(s) orally 3 times a day (16 Feb 2021 15:32)  Januvia 100 mg oral tablet: 1 tab(s) orally once a day (16 Feb 2021 15:32)  Levemir 100 units/mL subcutaneous solution: 68 unit(s) subcutaneously once a day (at bedtime) (16 Feb 2021 15:32)  losartan 100 mg oral tablet: 1 tab(s) orally once a day (16 Feb 2021 15:32)  metFORMIN 1000 mg oral tablet: 1 tab(s) orally 2 times a day (16 Feb 2021 15:32)  Multiple Vitamins oral tablet: 1 tab(s) orally once a day (16 Feb 2021 15:32)  pantoprazole 20 mg oral delayed release tablet: 1 tab(s) orally once a day (16 Feb 2021 15:32)  senna 8.6 mg oral tablet: 2 tab(s) orally once a day (at bedtime) (16 Feb 2021 15:32)  sodium chloride 1 g oral tablet: 1 tab(s) orally 2 times a day (22 Feb 2021 15:07)  solifenacin 10 mg oral tablet: 1 tab(s) orally once a day (16 Feb 2021 15:32)  Vitamin B1 100 mg oral tablet: 1 tab(s) orally once a day (16 Feb 2021 15:32)  Vitamin B6 50 mg oral tablet: 1 tab(s) orally once a day (16 Feb 2021 15:32)    MEDICATIONS  (STANDING):  aspirin enteric coated 81 milliGRAM(s) Oral daily  atorvastatin 20 milliGRAM(s) Oral at bedtime  bethanechol 50 milliGRAM(s) Oral two times a day  dextrose 40% Gel 15 Gram(s) Oral once  dextrose 5%. 1000 milliLiter(s) (50 mL/Hr) IV Continuous <Continuous>  dextrose 5%. 1000 milliLiter(s) (100 mL/Hr) IV Continuous <Continuous>  dextrose 50% Injectable 25 Gram(s) IV Push once  dextrose 50% Injectable 12.5 Gram(s) IV Push once  dextrose 50% Injectable 25 Gram(s) IV Push once  enoxaparin Injectable 40 milliGRAM(s) SubCutaneous daily  gabapentin 300 milliGRAM(s) Oral three times a day  glucagon  Injectable 1 milliGRAM(s) IntraMuscular once  hydrALAZINE 10 milliGRAM(s) Oral every 8 hours  insulin glargine Injectable (LANTUS) 77 Unit(s) SubCutaneous at bedtime  insulin lispro (ADMELOG) corrective regimen sliding scale   SubCutaneous three times a day before meals  insulin lispro (ADMELOG) corrective regimen sliding scale   SubCutaneous at bedtime  losartan 100 milliGRAM(s) Oral daily  multivitamin 1 Tablet(s) Oral daily  pantoprazole    Tablet 40 milliGRAM(s) Oral daily  polyethylene glycol 3350 17 Gram(s) Oral every 12 hours  senna 2 Tablet(s) Oral at bedtime  sodium chloride 1 Gram(s) Oral two times a day  tamsulosin 0.4 milliGRAM(s) Oral at bedtime    MEDICATIONS  (PRN):  acetaminophen   Tablet .. 650 milliGRAM(s) Oral every 4 hours PRN Mild Pain (1 - 3)  aluminum hydroxide/magnesium hydroxide/simethicone Suspension 30 milliLiter(s) Oral every 4 hours PRN Dyspepsia  amLODIPine   Tablet 5 milliGRAM(s) Oral daily PRN for SBP>150  ondansetron Injectable 4 milliGRAM(s) IV Push every 6 hours PRN Nausea      Allergies    No Known Allergies    Intolerances        Vital Signs Last 24 Hrs  T(C): 36.8 (22 Feb 2021 08:25), Max: 36.8 (22 Feb 2021 08:25)  T(F): 98.2 (22 Feb 2021 08:25), Max: 98.2 (22 Feb 2021 08:25)  HR: 70 (22 Feb 2021 08:25) (70 - 72)  BP: 145/49 (22 Feb 2021 08:25) (123/55 - 145/49)  BP(mean): --  RR: 18 (22 Feb 2021 10:24) (16 - 18)  SpO2: 98% (22 Feb 2021 10:24) (97% - 100%)      PHYSICAL EXAMINATION:    NECK:  Supple. No lymphadenopathy. Jugular venous pressure not elevated. Carotids equal.   HEART:   The cardiac impulse has a normal quality. Reg., Nl S1 and S2.  There are no murmurs, rubs or gallops noted  CHEST:  Chest crackles to auscultation. Normal respiratory effort.  ABDOMEN:  Soft and nontender.   EXTREMITIES:  There is no edema.       LABS:                        11.0   6.47  )-----------( 166      ( 21 Feb 2021 07:59 )             33.8     02-22    133<L>  |  100  |  18  ----------------------------<  234<H>  4.6   |  25  |  0.68    Ca    9.5      22 Feb 2021 08:51

## 2021-02-22 NOTE — DISCHARGE NOTE PROVIDER - NSDCPNSUBOBJ_GEN_ALL_CORE
· Subjective and Objective:   71yo/F with PMH CVA about 8 years ago on Aspirin/statin, chronic hyponatremia, diabetes w/diabetic neuropathy presented for evaluation of worsening SOB. Patient unable to provide clear history, mainly obtained from daughter over the phone. Per daughter, she was OK yesterday, but woke up today SOB and they send her in to the hospital.    02/17/21: Patient seen and examined. Still with sob. Discussed with patient regarding management and d/c plan.   Discussed with her daughter and son in law regarding management plan.   02/18/21: Patient seen and examined. Feels better today. Discussed with daughter  2/19-has pain at bruce site , will try voiding trial no abd pain, feels better other wise , still has generalised weaknes  2/20 feels better today, sitting in chair   2/21  feels better , no sob , still on 2 l NC  2/22 - feeling better.  o2 sat on ambulation and transfer with RN was ~95% on RA.          Assessment and Plan:   · Assessment	  see hospital course for details.

## 2021-02-22 NOTE — DISCHARGE NOTE PROVIDER - PROVIDER TOKENS
FREE:[LAST:[dr sana arshad - your primary care],PHONE:[(   )    -],FAX:[(   )    -],FOLLOWUP:[1 week]],PROVIDER:[TOKEN:[[id number]]],PROVIDER:[TOKEN:[[id number]]]

## 2021-02-23 PROBLEM — I63.9 CEREBRAL INFARCTION, UNSPECIFIED: Chronic | Status: ACTIVE | Noted: 2021-02-16

## 2021-02-23 PROBLEM — E11.9 TYPE 2 DIABETES MELLITUS WITHOUT COMPLICATIONS: Chronic | Status: ACTIVE | Noted: 2021-02-16

## 2021-02-24 ENCOUNTER — APPOINTMENT (OUTPATIENT)
Dept: UROLOGY | Facility: CLINIC | Age: 71
End: 2021-02-24

## 2021-02-26 DIAGNOSIS — Z86.73 PERSONAL HISTORY OF TRANSIENT ISCHEMIC ATTACK (TIA), AND CEREBRAL INFARCTION WITHOUT RESIDUAL DEFICITS: ICD-10-CM

## 2021-02-26 DIAGNOSIS — E83.52 HYPERCALCEMIA: ICD-10-CM

## 2021-02-26 DIAGNOSIS — E87.1 HYPO-OSMOLALITY AND HYPONATREMIA: ICD-10-CM

## 2021-02-26 DIAGNOSIS — K80.50 CALCULUS OF BILE DUCT WITHOUT CHOLANGITIS OR CHOLECYSTITIS WITHOUT OBSTRUCTION: ICD-10-CM

## 2021-02-26 DIAGNOSIS — Z79.82 LONG TERM (CURRENT) USE OF ASPIRIN: ICD-10-CM

## 2021-02-26 DIAGNOSIS — I10 ESSENTIAL (PRIMARY) HYPERTENSION: ICD-10-CM

## 2021-02-26 DIAGNOSIS — N13.30 UNSPECIFIED HYDRONEPHROSIS: ICD-10-CM

## 2021-02-26 DIAGNOSIS — J96.01 ACUTE RESPIRATORY FAILURE WITH HYPOXIA: ICD-10-CM

## 2021-02-26 DIAGNOSIS — Z79.4 LONG TERM (CURRENT) USE OF INSULIN: ICD-10-CM

## 2021-02-26 DIAGNOSIS — D50.9 IRON DEFICIENCY ANEMIA, UNSPECIFIED: ICD-10-CM

## 2021-02-26 DIAGNOSIS — R33.9 RETENTION OF URINE, UNSPECIFIED: ICD-10-CM

## 2021-02-26 DIAGNOSIS — J18.9 PNEUMONIA, UNSPECIFIED ORGANISM: ICD-10-CM

## 2021-02-26 DIAGNOSIS — E11.40 TYPE 2 DIABETES MELLITUS WITH DIABETIC NEUROPATHY, UNSPECIFIED: ICD-10-CM

## 2021-03-16 ENCOUNTER — APPOINTMENT (OUTPATIENT)
Dept: NEUROLOGY | Facility: CLINIC | Age: 71
End: 2021-03-16

## 2021-06-24 ENCOUNTER — APPOINTMENT (OUTPATIENT)
Dept: NEUROLOGY | Facility: CLINIC | Age: 71
End: 2021-06-24

## 2021-07-02 NOTE — ED ADULT NURSE NOTE - CHIEF COMPLAINT QUOTE
pt presents to ED with complaints of weakness and SOB. per EMS, pt was hypoxic and placed on 8L O2 via NC prior to arrival, but EMS unaware of what O2 sat was. pt 86% on RA in triage. pt brought to room for EKG and placed on 2L O2 via NC.
no

## 2022-01-10 ENCOUNTER — APPOINTMENT (OUTPATIENT)
Dept: NEUROLOGY | Facility: CLINIC | Age: 72
End: 2022-01-10

## 2022-11-14 ENCOUNTER — INPATIENT (INPATIENT)
Facility: HOSPITAL | Age: 72
LOS: 2 days | Discharge: HOSPICE | DRG: 698 | End: 2022-11-17
Attending: INTERNAL MEDICINE | Admitting: HOSPITALIST
Payer: OTHER MISCELLANEOUS

## 2022-11-14 VITALS
OXYGEN SATURATION: 98 % | WEIGHT: 110.01 LBS | RESPIRATION RATE: 16 BRPM | DIASTOLIC BLOOD PRESSURE: 53 MMHG | HEART RATE: 85 BPM | TEMPERATURE: 98 F | SYSTOLIC BLOOD PRESSURE: 148 MMHG
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
## 2022-11-14 DIAGNOSIS — Z90.49 ACQUIRED ABSENCE OF OTHER SPECIFIED PARTS OF DIGESTIVE TRACT: Chronic | ICD-10-CM

## 2022-11-14 DIAGNOSIS — N39.0 URINARY TRACT INFECTION, SITE NOT SPECIFIED: ICD-10-CM

## 2022-11-14 LAB
ALBUMIN SERPL ELPH-MCNC: 3.4 G/DL — SIGNIFICANT CHANGE UP (ref 3.3–5)
ALP SERPL-CCNC: 85 U/L — SIGNIFICANT CHANGE UP (ref 40–120)
ALT FLD-CCNC: 20 U/L — SIGNIFICANT CHANGE UP (ref 12–78)
ANION GAP SERPL CALC-SCNC: 7 MMOL/L — SIGNIFICANT CHANGE UP (ref 5–17)
APPEARANCE UR: CLEAR — SIGNIFICANT CHANGE UP
APTT BLD: 32.6 SEC — SIGNIFICANT CHANGE UP (ref 27.5–35.5)
AST SERPL-CCNC: 19 U/L — SIGNIFICANT CHANGE UP (ref 15–37)
BASOPHILS # BLD AUTO: 0.02 K/UL — SIGNIFICANT CHANGE UP (ref 0–0.2)
BASOPHILS NFR BLD AUTO: 0.4 % — SIGNIFICANT CHANGE UP (ref 0–2)
BILIRUB SERPL-MCNC: 0.4 MG/DL — SIGNIFICANT CHANGE UP (ref 0.2–1.2)
BILIRUB UR-MCNC: NEGATIVE — SIGNIFICANT CHANGE UP
BUN SERPL-MCNC: 14 MG/DL — SIGNIFICANT CHANGE UP (ref 7–23)
CALCIUM SERPL-MCNC: 9.7 MG/DL — SIGNIFICANT CHANGE UP (ref 8.5–10.1)
CHLORIDE SERPL-SCNC: 94 MMOL/L — LOW (ref 96–108)
CO2 SERPL-SCNC: 28 MMOL/L — SIGNIFICANT CHANGE UP (ref 22–31)
COLOR SPEC: YELLOW — SIGNIFICANT CHANGE UP
CREAT SERPL-MCNC: 1.09 MG/DL — SIGNIFICANT CHANGE UP (ref 0.5–1.3)
DIFF PNL FLD: ABNORMAL
EGFR: 55 ML/MIN/1.73M2 — LOW
EOSINOPHIL # BLD AUTO: 0.06 K/UL — SIGNIFICANT CHANGE UP (ref 0–0.5)
EOSINOPHIL NFR BLD AUTO: 1.1 % — SIGNIFICANT CHANGE UP (ref 0–6)
GLUCOSE SERPL-MCNC: 182 MG/DL — HIGH (ref 70–99)
GLUCOSE UR QL: NEGATIVE — SIGNIFICANT CHANGE UP
HCT VFR BLD CALC: 30.7 % — LOW (ref 34.5–45)
HGB BLD-MCNC: 9.9 G/DL — LOW (ref 11.5–15.5)
HPIV4 RNA SPEC QL NAA+PROBE: DETECTED
IMM GRANULOCYTES NFR BLD AUTO: 0.2 % — SIGNIFICANT CHANGE UP (ref 0–0.9)
INR BLD: 1.14 RATIO — SIGNIFICANT CHANGE UP (ref 0.88–1.16)
KETONES UR-MCNC: NEGATIVE — SIGNIFICANT CHANGE UP
LACTATE SERPL-SCNC: 3.9 MMOL/L — HIGH (ref 0.7–2)
LEUKOCYTE ESTERASE UR-ACNC: ABNORMAL
LYMPHOCYTES # BLD AUTO: 1.04 K/UL — SIGNIFICANT CHANGE UP (ref 1–3.3)
LYMPHOCYTES # BLD AUTO: 18.9 % — SIGNIFICANT CHANGE UP (ref 13–44)
MCHC RBC-ENTMCNC: 27.7 PG — SIGNIFICANT CHANGE UP (ref 27–34)
MCHC RBC-ENTMCNC: 32.2 GM/DL — SIGNIFICANT CHANGE UP (ref 32–36)
MCV RBC AUTO: 86 FL — SIGNIFICANT CHANGE UP (ref 80–100)
MONOCYTES # BLD AUTO: 0.61 K/UL — SIGNIFICANT CHANGE UP (ref 0–0.9)
MONOCYTES NFR BLD AUTO: 11.1 % — SIGNIFICANT CHANGE UP (ref 2–14)
NEUTROPHILS # BLD AUTO: 3.75 K/UL — SIGNIFICANT CHANGE UP (ref 1.8–7.4)
NEUTROPHILS NFR BLD AUTO: 68.3 % — SIGNIFICANT CHANGE UP (ref 43–77)
NITRITE UR-MCNC: POSITIVE
PH UR: 6.5 — SIGNIFICANT CHANGE UP (ref 5–8)
PLATELET # BLD AUTO: 146 K/UL — LOW (ref 150–400)
POTASSIUM SERPL-MCNC: 4.2 MMOL/L — SIGNIFICANT CHANGE UP (ref 3.5–5.3)
POTASSIUM SERPL-SCNC: 4.2 MMOL/L — SIGNIFICANT CHANGE UP (ref 3.5–5.3)
PROT SERPL-MCNC: 7.4 GM/DL — SIGNIFICANT CHANGE UP (ref 6–8.3)
PROT UR-MCNC: NEGATIVE — SIGNIFICANT CHANGE UP
PROTHROM AB SERPL-ACNC: 13.2 SEC — SIGNIFICANT CHANGE UP (ref 10.5–13.4)
RAPID RVP RESULT: DETECTED
RBC # BLD: 3.57 M/UL — LOW (ref 3.8–5.2)
RBC # FLD: 14.2 % — SIGNIFICANT CHANGE UP (ref 10.3–14.5)
SARS-COV-2 RNA SPEC QL NAA+PROBE: SIGNIFICANT CHANGE UP
SODIUM SERPL-SCNC: 129 MMOL/L — LOW (ref 135–145)
SP GR SPEC: 1 — LOW (ref 1.01–1.02)
UROBILINOGEN FLD QL: NEGATIVE — SIGNIFICANT CHANGE UP
WBC # BLD: 5.49 K/UL — SIGNIFICANT CHANGE UP (ref 3.8–10.5)
WBC # FLD AUTO: 5.49 K/UL — SIGNIFICANT CHANGE UP (ref 3.8–10.5)

## 2022-11-14 PROCEDURE — 97162 PT EVAL MOD COMPLEX 30 MIN: CPT | Mod: GP

## 2022-11-14 PROCEDURE — 83605 ASSAY OF LACTIC ACID: CPT

## 2022-11-14 PROCEDURE — 74177 CT ABD & PELVIS W/CONTRAST: CPT | Mod: MA

## 2022-11-14 PROCEDURE — 97116 GAIT TRAINING THERAPY: CPT | Mod: GP

## 2022-11-14 PROCEDURE — 99223 1ST HOSP IP/OBS HIGH 75: CPT

## 2022-11-14 PROCEDURE — 83036 HEMOGLOBIN GLYCOSYLATED A1C: CPT

## 2022-11-14 PROCEDURE — 97530 THERAPEUTIC ACTIVITIES: CPT | Mod: GP

## 2022-11-14 PROCEDURE — 85027 COMPLETE CBC AUTOMATED: CPT

## 2022-11-14 PROCEDURE — 92610 EVALUATE SWALLOWING FUNCTION: CPT | Mod: GN

## 2022-11-14 PROCEDURE — 82962 GLUCOSE BLOOD TEST: CPT

## 2022-11-14 PROCEDURE — 80053 COMPREHEN METABOLIC PANEL: CPT

## 2022-11-14 PROCEDURE — 92523 SPEECH SOUND LANG COMPREHEN: CPT | Mod: GN

## 2022-11-14 PROCEDURE — 99285 EMERGENCY DEPT VISIT HI MDM: CPT

## 2022-11-14 PROCEDURE — 36415 COLL VENOUS BLD VENIPUNCTURE: CPT

## 2022-11-14 PROCEDURE — 99497 ADVNCD CARE PLAN 30 MIN: CPT | Mod: 25

## 2022-11-14 PROCEDURE — 71045 X-RAY EXAM CHEST 1 VIEW: CPT | Mod: 26

## 2022-11-14 PROCEDURE — 74177 CT ABD & PELVIS W/CONTRAST: CPT | Mod: 26

## 2022-11-14 RX ORDER — PYRIDOXINE HCL (VITAMIN B6) 100 MG
50 TABLET ORAL DAILY
Refills: 0 | Status: DISCONTINUED | OUTPATIENT
Start: 2022-11-14 | End: 2022-11-17

## 2022-11-14 RX ORDER — SODIUM CHLORIDE 9 MG/ML
1000 INJECTION INTRAMUSCULAR; INTRAVENOUS; SUBCUTANEOUS ONCE
Refills: 0 | Status: COMPLETED | OUTPATIENT
Start: 2022-11-14 | End: 2022-11-14

## 2022-11-14 RX ORDER — THIAMINE MONONITRATE (VIT B1) 100 MG
1 TABLET ORAL
Qty: 0 | Refills: 0 | DISCHARGE

## 2022-11-14 RX ORDER — MORPHINE SULFATE 50 MG/1
5 CAPSULE, EXTENDED RELEASE ORAL EVERY 6 HOURS
Refills: 0 | Status: DISCONTINUED | OUTPATIENT
Start: 2022-11-14 | End: 2022-11-17

## 2022-11-14 RX ORDER — DEXTROSE 50 % IN WATER 50 %
25 SYRINGE (ML) INTRAVENOUS ONCE
Refills: 0 | Status: DISCONTINUED | OUTPATIENT
Start: 2022-11-14 | End: 2022-11-17

## 2022-11-14 RX ORDER — AZITHROMYCIN 500 MG/1
500 TABLET, FILM COATED ORAL EVERY 24 HOURS
Refills: 0 | Status: DISCONTINUED | OUTPATIENT
Start: 2022-11-15 | End: 2022-11-17

## 2022-11-14 RX ORDER — DEXTROSE 50 % IN WATER 50 %
15 SYRINGE (ML) INTRAVENOUS ONCE
Refills: 0 | Status: DISCONTINUED | OUTPATIENT
Start: 2022-11-14 | End: 2022-11-17

## 2022-11-14 RX ORDER — CEFTRIAXONE 500 MG/1
1000 INJECTION, POWDER, FOR SOLUTION INTRAMUSCULAR; INTRAVENOUS ONCE
Refills: 0 | Status: COMPLETED | OUTPATIENT
Start: 2022-11-14 | End: 2022-11-14

## 2022-11-14 RX ORDER — SODIUM CHLORIDE 0.65 %
2 AEROSOL, SPRAY (ML) NASAL
Qty: 0 | Refills: 0 | DISCHARGE

## 2022-11-14 RX ORDER — FOLIC ACID 0.8 MG
1 TABLET ORAL
Qty: 0 | Refills: 0 | DISCHARGE

## 2022-11-14 RX ORDER — GABAPENTIN 400 MG/1
1 CAPSULE ORAL
Qty: 0 | Refills: 0 | DISCHARGE

## 2022-11-14 RX ORDER — LOSARTAN POTASSIUM 100 MG/1
1 TABLET, FILM COATED ORAL
Qty: 0 | Refills: 0 | DISCHARGE

## 2022-11-14 RX ORDER — OXYBUTYNIN CHLORIDE 5 MG
5 TABLET ORAL DAILY
Refills: 0 | Status: DISCONTINUED | OUTPATIENT
Start: 2022-11-14 | End: 2022-11-17

## 2022-11-14 RX ORDER — HYDRALAZINE HCL 50 MG
25 TABLET ORAL AT BEDTIME
Refills: 0 | Status: DISCONTINUED | OUTPATIENT
Start: 2022-11-14 | End: 2022-11-17

## 2022-11-14 RX ORDER — CEFTRIAXONE 500 MG/1
1000 INJECTION, POWDER, FOR SOLUTION INTRAMUSCULAR; INTRAVENOUS EVERY 24 HOURS
Refills: 0 | Status: DISCONTINUED | OUTPATIENT
Start: 2022-11-15 | End: 2022-11-17

## 2022-11-14 RX ORDER — SODIUM CHLORIDE 9 MG/ML
1000 INJECTION, SOLUTION INTRAVENOUS
Refills: 0 | Status: DISCONTINUED | OUTPATIENT
Start: 2022-11-14 | End: 2022-11-17

## 2022-11-14 RX ORDER — ATORVASTATIN CALCIUM 80 MG/1
1 TABLET, FILM COATED ORAL
Qty: 0 | Refills: 0 | DISCHARGE

## 2022-11-14 RX ORDER — ASPIRIN/CALCIUM CARB/MAGNESIUM 324 MG
81 TABLET ORAL DAILY
Refills: 0 | Status: DISCONTINUED | OUTPATIENT
Start: 2022-11-14 | End: 2022-11-17

## 2022-11-14 RX ORDER — INSULIN LISPRO 100/ML
VIAL (ML) SUBCUTANEOUS AT BEDTIME
Refills: 0 | Status: DISCONTINUED | OUTPATIENT
Start: 2022-11-14 | End: 2022-11-17

## 2022-11-14 RX ORDER — SODIUM CHLORIDE 0.65 %
1 AEROSOL, SPRAY (ML) NASAL
Refills: 0 | Status: DISCONTINUED | OUTPATIENT
Start: 2022-11-14 | End: 2022-11-17

## 2022-11-14 RX ORDER — VALSARTAN 80 MG/1
320 TABLET ORAL DAILY
Refills: 0 | Status: DISCONTINUED | OUTPATIENT
Start: 2022-11-14 | End: 2022-11-17

## 2022-11-14 RX ORDER — CHOLECALCIFEROL (VITAMIN D3) 125 MCG
2000 CAPSULE ORAL DAILY
Refills: 0 | Status: DISCONTINUED | OUTPATIENT
Start: 2022-11-14 | End: 2022-11-17

## 2022-11-14 RX ORDER — GABAPENTIN 400 MG/1
600 CAPSULE ORAL AT BEDTIME
Refills: 0 | Status: DISCONTINUED | OUTPATIENT
Start: 2022-11-14 | End: 2022-11-17

## 2022-11-14 RX ORDER — INFLUENZA VIRUS VACCINE 15; 15; 15; 15 UG/.5ML; UG/.5ML; UG/.5ML; UG/.5ML
0.7 SUSPENSION INTRAMUSCULAR ONCE
Refills: 0 | Status: DISCONTINUED | OUTPATIENT
Start: 2022-11-14 | End: 2022-11-17

## 2022-11-14 RX ORDER — CHOLECALCIFEROL (VITAMIN D3) 125 MCG
1 CAPSULE ORAL
Qty: 0 | Refills: 0 | DISCHARGE

## 2022-11-14 RX ORDER — INSULIN LISPRO 100/ML
VIAL (ML) SUBCUTANEOUS
Refills: 0 | Status: DISCONTINUED | OUTPATIENT
Start: 2022-11-14 | End: 2022-11-17

## 2022-11-14 RX ORDER — GABAPENTIN 400 MG/1
400 CAPSULE ORAL
Refills: 0 | Status: DISCONTINUED | OUTPATIENT
Start: 2022-11-15 | End: 2022-11-17

## 2022-11-14 RX ORDER — SITAGLIPTIN 50 MG/1
1 TABLET, FILM COATED ORAL
Qty: 0 | Refills: 0 | DISCHARGE

## 2022-11-14 RX ORDER — PREGABALIN 225 MG/1
2 CAPSULE ORAL
Qty: 0 | Refills: 0 | DISCHARGE

## 2022-11-14 RX ORDER — INSULIN DETEMIR 100/ML (3)
68 INSULIN PEN (ML) SUBCUTANEOUS
Qty: 0 | Refills: 0 | DISCHARGE

## 2022-11-14 RX ORDER — METFORMIN HYDROCHLORIDE 850 MG/1
1 TABLET ORAL
Qty: 0 | Refills: 0 | DISCHARGE

## 2022-11-14 RX ORDER — ENOXAPARIN SODIUM 100 MG/ML
40 INJECTION SUBCUTANEOUS EVERY 24 HOURS
Refills: 0 | Status: DISCONTINUED | OUTPATIENT
Start: 2022-11-14 | End: 2022-11-17

## 2022-11-14 RX ORDER — THIAMINE MONONITRATE (VIT B1) 100 MG
100 TABLET ORAL DAILY
Refills: 0 | Status: DISCONTINUED | OUTPATIENT
Start: 2022-11-14 | End: 2022-11-17

## 2022-11-14 RX ORDER — CEFTRIAXONE 500 MG/1
1000 INJECTION, POWDER, FOR SOLUTION INTRAMUSCULAR; INTRAVENOUS ONCE
Refills: 0 | Status: DISCONTINUED | OUTPATIENT
Start: 2022-11-14 | End: 2022-11-14

## 2022-11-14 RX ORDER — CALCIUM CARBONATE 500(1250)
1 TABLET ORAL
Refills: 0 | Status: DISCONTINUED | OUTPATIENT
Start: 2022-11-14 | End: 2022-11-17

## 2022-11-14 RX ORDER — SODIUM CHLORIDE 9 MG/ML
1 INJECTION INTRAMUSCULAR; INTRAVENOUS; SUBCUTANEOUS
Refills: 0 | Status: DISCONTINUED | OUTPATIENT
Start: 2022-11-14 | End: 2022-11-17

## 2022-11-14 RX ORDER — PANTOPRAZOLE SODIUM 20 MG/1
1 TABLET, DELAYED RELEASE ORAL
Qty: 0 | Refills: 0 | DISCHARGE

## 2022-11-14 RX ORDER — FERROUS SULFATE 325(65) MG
1 TABLET ORAL
Qty: 0 | Refills: 0 | DISCHARGE

## 2022-11-14 RX ORDER — GABAPENTIN 400 MG/1
400 CAPSULE ORAL DAILY
Refills: 0 | Status: DISCONTINUED | OUTPATIENT
Start: 2022-11-14 | End: 2022-11-17

## 2022-11-14 RX ORDER — GABAPENTIN 400 MG/1
2 CAPSULE ORAL
Qty: 0 | Refills: 0 | DISCHARGE

## 2022-11-14 RX ORDER — DEXTROSE 50 % IN WATER 50 %
12.5 SYRINGE (ML) INTRAVENOUS ONCE
Refills: 0 | Status: DISCONTINUED | OUTPATIENT
Start: 2022-11-14 | End: 2022-11-17

## 2022-11-14 RX ORDER — MORPHINE SULFATE 50 MG/1
5 CAPSULE, EXTENDED RELEASE ORAL
Qty: 0 | Refills: 0 | DISCHARGE

## 2022-11-14 RX ORDER — HYDRALAZINE HCL 50 MG
1 TABLET ORAL
Qty: 0 | Refills: 0 | DISCHARGE

## 2022-11-14 RX ORDER — ASPIRIN/CALCIUM CARB/MAGNESIUM 324 MG
1 TABLET ORAL
Qty: 0 | Refills: 0 | DISCHARGE

## 2022-11-14 RX ORDER — SENNA PLUS 8.6 MG/1
2 TABLET ORAL AT BEDTIME
Refills: 0 | Status: DISCONTINUED | OUTPATIENT
Start: 2022-11-14 | End: 2022-11-17

## 2022-11-14 RX ORDER — SOLIFENACIN SUCCINATE 10 MG/1
1 TABLET ORAL
Qty: 0 | Refills: 0 | DISCHARGE

## 2022-11-14 RX ORDER — GLUCAGON INJECTION, SOLUTION 0.5 MG/.1ML
1 INJECTION, SOLUTION SUBCUTANEOUS ONCE
Refills: 0 | Status: DISCONTINUED | OUTPATIENT
Start: 2022-11-14 | End: 2022-11-17

## 2022-11-14 RX ORDER — VALSARTAN 80 MG/1
1 TABLET ORAL
Qty: 0 | Refills: 0 | DISCHARGE

## 2022-11-14 RX ORDER — PREGABALIN 225 MG/1
2000 CAPSULE ORAL DAILY
Refills: 0 | Status: DISCONTINUED | OUTPATIENT
Start: 2022-11-14 | End: 2022-11-17

## 2022-11-14 RX ORDER — CALCIUM CARBONATE 500(1250)
1 TABLET ORAL
Qty: 0 | Refills: 0 | DISCHARGE

## 2022-11-14 RX ORDER — INSULIN GLARGINE 100 [IU]/ML
50 INJECTION, SOLUTION SUBCUTANEOUS AT BEDTIME
Refills: 0 | Status: DISCONTINUED | OUTPATIENT
Start: 2022-11-14 | End: 2022-11-17

## 2022-11-14 RX ORDER — AZITHROMYCIN 500 MG/1
500 TABLET, FILM COATED ORAL ONCE
Refills: 0 | Status: COMPLETED | OUTPATIENT
Start: 2022-11-14 | End: 2022-11-14

## 2022-11-14 RX ORDER — FERROUS SULFATE 325(65) MG
325 TABLET ORAL DAILY
Refills: 0 | Status: DISCONTINUED | OUTPATIENT
Start: 2022-11-14 | End: 2022-11-17

## 2022-11-14 RX ORDER — AZITHROMYCIN 500 MG/1
TABLET, FILM COATED ORAL
Refills: 0 | Status: DISCONTINUED | OUTPATIENT
Start: 2022-11-14 | End: 2022-11-17

## 2022-11-14 RX ORDER — TAMSULOSIN HYDROCHLORIDE 0.4 MG/1
0.4 CAPSULE ORAL AT BEDTIME
Refills: 0 | Status: DISCONTINUED | OUTPATIENT
Start: 2022-11-14 | End: 2022-11-17

## 2022-11-14 RX ORDER — GUAIFENESIN/DEXTROMETHORPHAN 600MG-30MG
10 TABLET, EXTENDED RELEASE 12 HR ORAL EVERY 4 HOURS
Refills: 0 | Status: DISCONTINUED | OUTPATIENT
Start: 2022-11-14 | End: 2022-11-17

## 2022-11-14 RX ORDER — FOLIC ACID 0.8 MG
1 TABLET ORAL DAILY
Refills: 0 | Status: DISCONTINUED | OUTPATIENT
Start: 2022-11-14 | End: 2022-11-17

## 2022-11-14 RX ORDER — PYRIDOXINE HCL (VITAMIN B6) 100 MG
1 TABLET ORAL
Qty: 0 | Refills: 0 | DISCHARGE

## 2022-11-14 RX ORDER — SENNA PLUS 8.6 MG/1
2 TABLET ORAL
Qty: 0 | Refills: 0 | DISCHARGE

## 2022-11-14 RX ORDER — PANTOPRAZOLE SODIUM 20 MG/1
40 TABLET, DELAYED RELEASE ORAL
Refills: 0 | Status: DISCONTINUED | OUTPATIENT
Start: 2022-11-14 | End: 2022-11-17

## 2022-11-14 RX ADMIN — SODIUM CHLORIDE 1000 MILLILITER(S): 9 INJECTION INTRAMUSCULAR; INTRAVENOUS; SUBCUTANEOUS at 14:25

## 2022-11-14 RX ADMIN — SODIUM CHLORIDE 1 GRAM(S): 9 INJECTION INTRAMUSCULAR; INTRAVENOUS; SUBCUTANEOUS at 22:01

## 2022-11-14 RX ADMIN — SODIUM CHLORIDE 1000 MILLILITER(S): 9 INJECTION INTRAMUSCULAR; INTRAVENOUS; SUBCUTANEOUS at 13:25

## 2022-11-14 RX ADMIN — CEFTRIAXONE 1000 MILLIGRAM(S): 500 INJECTION, POWDER, FOR SOLUTION INTRAMUSCULAR; INTRAVENOUS at 13:46

## 2022-11-14 RX ADMIN — Medication 1 TABLET(S): at 22:01

## 2022-11-14 RX ADMIN — GABAPENTIN 600 MILLIGRAM(S): 400 CAPSULE ORAL at 22:00

## 2022-11-14 RX ADMIN — ENOXAPARIN SODIUM 40 MILLIGRAM(S): 100 INJECTION SUBCUTANEOUS at 22:14

## 2022-11-14 RX ADMIN — SENNA PLUS 2 TABLET(S): 8.6 TABLET ORAL at 22:00

## 2022-11-14 RX ADMIN — MORPHINE SULFATE 5 MILLIGRAM(S): 50 CAPSULE, EXTENDED RELEASE ORAL at 23:51

## 2022-11-14 RX ADMIN — AZITHROMYCIN 500 MILLIGRAM(S): 500 TABLET, FILM COATED ORAL at 22:14

## 2022-11-14 RX ADMIN — Medication 1 DROP(S): at 23:10

## 2022-11-14 RX ADMIN — Medication 25 MILLIGRAM(S): at 22:00

## 2022-11-14 RX ADMIN — INSULIN GLARGINE 50 UNIT(S): 100 INJECTION, SOLUTION SUBCUTANEOUS at 23:39

## 2022-11-14 RX ADMIN — MORPHINE SULFATE 5 MILLIGRAM(S): 50 CAPSULE, EXTENDED RELEASE ORAL at 23:21

## 2022-11-14 RX ADMIN — TAMSULOSIN HYDROCHLORIDE 0.4 MILLIGRAM(S): 0.4 CAPSULE ORAL at 22:00

## 2022-11-14 NOTE — ED ADULT NURSE NOTE - NSIMPLEMENTINTERV_GEN_ALL_ED
Implemented All Fall Risk Interventions:  [place] to call system. Call bell, personal items and telephone within reach. Instruct patient to call for assistance. Room bathroom lighting operational. Non-slip footwear when patient is off stretcher. Physically safe environment: no spills, clutter or unnecessary equipment. Stretcher in lowest position, wheels locked, appropriate side rails in place. Provide visual cue, wrist band, yellow gown, etc. Monitor gait and stability. Monitor for mental status changes and reorient to person, place, and time. Review medications for side effects contributing to fall risk. Reinforce activity limits and safety measures with patient and family.

## 2022-11-14 NOTE — H&P ADULT - NSHPPHYSICALEXAM_GEN_ALL_CORE
ICU Vital Signs Last 24 Hrs  T(C): 37.2 (14 Nov 2022 13:41), Max: 37.2 (14 Nov 2022 13:41)  T(F): 99 (14 Nov 2022 13:41), Max: 99 (14 Nov 2022 13:41)  HR: 85 (14 Nov 2022 11:55) (85 - 85)  BP: 148/53 (14 Nov 2022 11:55) (148/53 - 148/53)    RR: 16 (14 Nov 2022 11:55) (16 - 16)   SpO2: 98% (14 Nov 2022 11:55) (98% - 98%)    O2 Parameters below as of 14 Nov 2022 11:55  Patient On (Oxygen Delivery Method): room air

## 2022-11-14 NOTE — ED ADULT NURSE NOTE - OBJECTIVE STATEMENT
pt awake and alert, following commands, as per triage note pt sent for increased weakness and cough xfew days and is a hospice pt , pt c/o abd pain and states "I have a UTI", resp and unlabored no distress noted, abd distended and pain with palpation, pt has chronic bruce and is attached to leg pain draining clear yellow urine

## 2022-11-14 NOTE — ED PROVIDER NOTE - OBJECTIVE STATEMENT
73 y/o female with PMHx of CVA, gall bladder CA and DM presents to the ED c/o worsening weakness x2 years and abd pain. Pt states that today the abd pain and weakness was worsening so she came in for evaluation and is concerned for possible infection.  Poor PO intake, no bowel movements. Pt is a poor historian. Denies fevers. Daughter reportedly en route to the ER  [place]  ID: [id number]

## 2022-11-14 NOTE — PATIENT PROFILE ADULT - FALL HARM RISK - HARM RISK INTERVENTIONS

## 2022-11-14 NOTE — ED ADULT TRIAGE NOTE - CHIEF COMPLAINT QUOTE
pt presents to ED brought in by ambulance from home primary language is DAT as per EMS pt is currently on Hospice and has been more weak with a cough over the last few days

## 2022-11-14 NOTE — PHARMACOTHERAPY INTERVENTION NOTE - COMMENTS
Medication reconciliation completed.  Patient was unable to provide medication information, spoke to timbo Blake at bedside and they provided current medication list; confirmed with Dr. [facility].

## 2022-11-14 NOTE — ED PROVIDER NOTE - PROGRESS NOTE DETAILS
Gregg Cervantes for Dr. Minor :  Spoke to daughter who says her mom has been increasingly weak over the last 5 days, poor PO intake. has a UTI treated by oral Abx, but still with foul smelling urine. Daughter spoke with hospice who then sent pt to the hospital.

## 2022-11-14 NOTE — H&P ADULT - ASSESSMENT
Pt is admitted w/    Weakness  UTI, Pyelonephritis  Community Acq Pna , R ML and Infr L lingula  Parainfluenza virus  Constipation  GB Ca not a candidate for chemotherapy or radiation, per daughter  Cirrhosis   Abn CT abd ( see report)  DM  - s/p 1 L NS in the ED  - s/p Rocephin, add Zithromax, cont  - isolation  - elevated lactate , likely due to cirrhosis  - blood and sputum cx  - Pulm consult  - swallow eval  - add Miralax  - ISS and lantus  - cont pain meds as needed  - Palliative cAre consult  - DVT proph: lovenox  - DNR, MOLST completed with pt's daughter who is the HCP

## 2022-11-14 NOTE — H&P ADULT - HISTORY OF PRESENT ILLNESS
Pt is a pleasant 71 y/o female with PMHx of CVA about 9 years ago on Aspirin/statin, chronic hyponatremia on salt tablets, Diabetes w/diabetic neuropathy, Gall bladder CA, Anemia of chronic disease,  multifocal Pna 2 /2021  who was previously on home hospice and now presents to [place]  ED c/o worsening weakness  and abd pain. Pt's daughter reported pt has been having foul smelling urine and a cough.  Pt also had poor PO intake, no bowel movements.     Pt is a poor historian. Denies fevers.     Pt is a pleasant 73 y/o female largely bed bound with PMHx of CVA about 9 years ago on Aspirin/statin, chronic hyponatremia on salt tablets, Diabetes w/diabetic neuropathy,  Anemia of chronic disease,  Multifocal Pna 2 /2021, Gall bladder CA, and abd pain who was previously on home hospice and now presents to [place]  ED c/o worsening weakness  and increasing abd pain.  Pt's daughter reported pt has been having foul smelling urine and a dry cough.   Pt also had poor PO intake, and no bowel movements for 5 days.  Her daughter reports she has been taking Bactrim for 5 days without improvement.  Pt previously responded well to Milk of Magnesia,  however lately it has not helped her move her bowels.   Sick contact : pt's son-in-law has recently had a cough.     Pt denies chest pain, no SOB, no v/d,  no calf pain.  Pt reports some dysuria.  No specific new weakness of the extremities.

## 2022-11-14 NOTE — H&P ADULT - CONVERSATION DETAILS
Pt's daughter is her HCP.  Pt is DNR,  no feeding tube.  Antibiotics and IVF are ok.    MOLST completed after discussion with pt and daughter.  PT also had a DNR at [facility].

## 2022-11-15 DIAGNOSIS — C22.9 MALIGNANT NEOPLASM OF LIVER, NOT SPECIFIED AS PRIMARY OR SECONDARY: ICD-10-CM

## 2022-11-15 LAB
A1C WITH ESTIMATED AVERAGE GLUCOSE RESULT: 6 % — HIGH (ref 4–5.6)
CULTURE RESULTS: SIGNIFICANT CHANGE UP
ESTIMATED AVERAGE GLUCOSE: 126 MG/DL — HIGH (ref 68–114)
SPECIMEN SOURCE: SIGNIFICANT CHANGE UP

## 2022-11-15 PROCEDURE — 99223 1ST HOSP IP/OBS HIGH 75: CPT

## 2022-11-15 PROCEDURE — 99232 SBSQ HOSP IP/OBS MODERATE 35: CPT

## 2022-11-15 PROCEDURE — 99497 ADVNCD CARE PLAN 30 MIN: CPT | Mod: 25

## 2022-11-15 RX ORDER — LACTULOSE 10 G/15ML
10 SOLUTION ORAL ONCE
Refills: 0 | Status: COMPLETED | OUTPATIENT
Start: 2022-11-15 | End: 2022-11-15

## 2022-11-15 RX ORDER — POLYETHYLENE GLYCOL 3350 17 G/17G
17 POWDER, FOR SOLUTION ORAL DAILY
Refills: 0 | Status: DISCONTINUED | OUTPATIENT
Start: 2022-11-15 | End: 2022-11-17

## 2022-11-15 RX ADMIN — CEFTRIAXONE 1000 MILLIGRAM(S): 500 INJECTION, POWDER, FOR SOLUTION INTRAMUSCULAR; INTRAVENOUS at 12:15

## 2022-11-15 RX ADMIN — Medication 50 MILLIGRAM(S): at 12:14

## 2022-11-15 RX ADMIN — PANTOPRAZOLE SODIUM 40 MILLIGRAM(S): 20 TABLET, DELAYED RELEASE ORAL at 05:15

## 2022-11-15 RX ADMIN — ENOXAPARIN SODIUM 40 MILLIGRAM(S): 100 INJECTION SUBCUTANEOUS at 22:32

## 2022-11-15 RX ADMIN — TAMSULOSIN HYDROCHLORIDE 0.4 MILLIGRAM(S): 0.4 CAPSULE ORAL at 22:33

## 2022-11-15 RX ADMIN — Medication 1 DROP(S): at 12:32

## 2022-11-15 RX ADMIN — Medication 1 DROP(S): at 17:50

## 2022-11-15 RX ADMIN — PREGABALIN 2000 MICROGRAM(S): 225 CAPSULE ORAL at 09:59

## 2022-11-15 RX ADMIN — Medication 1 TABLET(S): at 12:13

## 2022-11-15 RX ADMIN — Medication 1 DROP(S): at 05:15

## 2022-11-15 RX ADMIN — AZITHROMYCIN 255 MILLIGRAM(S): 500 TABLET, FILM COATED ORAL at 22:32

## 2022-11-15 RX ADMIN — GABAPENTIN 600 MILLIGRAM(S): 400 CAPSULE ORAL at 22:33

## 2022-11-15 RX ADMIN — Medication 10 MILLIGRAM(S): at 12:13

## 2022-11-15 RX ADMIN — SODIUM CHLORIDE 1 GRAM(S): 9 INJECTION INTRAMUSCULAR; INTRAVENOUS; SUBCUTANEOUS at 15:17

## 2022-11-15 RX ADMIN — Medication 25 MILLIGRAM(S): at 22:32

## 2022-11-15 RX ADMIN — Medication 2000 UNIT(S): at 09:59

## 2022-11-15 RX ADMIN — POLYETHYLENE GLYCOL 3350 17 GRAM(S): 17 POWDER, FOR SOLUTION ORAL at 10:00

## 2022-11-15 RX ADMIN — Medication 1 DROP(S): at 23:20

## 2022-11-15 RX ADMIN — INSULIN GLARGINE 50 UNIT(S): 100 INJECTION, SOLUTION SUBCUTANEOUS at 22:33

## 2022-11-15 RX ADMIN — VALSARTAN 320 MILLIGRAM(S): 80 TABLET ORAL at 12:14

## 2022-11-15 RX ADMIN — SODIUM CHLORIDE 1 GRAM(S): 9 INJECTION INTRAMUSCULAR; INTRAVENOUS; SUBCUTANEOUS at 22:33

## 2022-11-15 RX ADMIN — SENNA PLUS 2 TABLET(S): 8.6 TABLET ORAL at 22:32

## 2022-11-15 RX ADMIN — Medication 1 TABLET(S): at 22:33

## 2022-11-15 RX ADMIN — LACTULOSE 10 GRAM(S): 10 SOLUTION ORAL at 13:53

## 2022-11-15 RX ADMIN — Medication 5 MILLIGRAM(S): at 12:14

## 2022-11-15 RX ADMIN — Medication 1 TABLET(S): at 09:59

## 2022-11-15 RX ADMIN — Medication 325 MILLIGRAM(S): at 09:58

## 2022-11-15 RX ADMIN — GABAPENTIN 400 MILLIGRAM(S): 400 CAPSULE ORAL at 05:15

## 2022-11-15 RX ADMIN — Medication 100 MILLIGRAM(S): at 09:59

## 2022-11-15 RX ADMIN — Medication 1 MILLIGRAM(S): at 09:59

## 2022-11-15 RX ADMIN — GABAPENTIN 400 MILLIGRAM(S): 400 CAPSULE ORAL at 12:13

## 2022-11-15 RX ADMIN — Medication 81 MILLIGRAM(S): at 09:59

## 2022-11-15 NOTE — CONSULT NOTE ADULT - PROVIDER SPECIALTY LIST ADULT
Palliative Care
Pulmonology
Infectious Disease
Palliative Care
need for outpatient follow-up/lab results/radiology results/return to ED if symptoms worsen, persist or questions arise

## 2022-11-15 NOTE — CONSULT NOTE ADULT - SUBJECTIVE AND OBJECTIVE BOX
Patient is a 72y old  Female who presents with a chief complaint of Weakness  UTI, Pyelonephritis  Pna  Constipation (15 Nov 2022 14:14)    HPI:  Pt is a  71 y/o female largely bed bound with PMHx of CVA about 9 years ago on Aspirin/statin, chronic hyponatremia on salt tablets, Diabetes w/diabetic neuropathy,  Anemia of chronic disease,  Multifocal Pna , Gall bladder CA, and abd pain who was previously on home hospice and now presents to [place]  ED on  for evaluation of worsening weakness, dry cough, decreased oral intake; son in law is sick with a cough. History per medical record as patient is not providing details.         PMH: as above  PSH: as above  Meds: per reconciliation sheet, noted below  MEDICATIONS  (STANDING):  artificial  tears Solution 1 Drop(s) Both EYES four times a day  aspirin enteric coated 81 milliGRAM(s) Oral daily  azithromycin  IVPB      azithromycin  IVPB 500 milliGRAM(s) IV Intermittent every 24 hours  calcium carbonate    500 mG (Tums) Chewable 1 Tablet(s) Chew two times a day  cefTRIAXone Injectable. 1000 milliGRAM(s) IV Push every 24 hours  cholecalciferol 2000 Unit(s) Oral daily  cyanocobalamin 2000 MICROGram(s) Oral daily  dextrose 5%. 1000 milliLiter(s) (50 mL/Hr) IV Continuous <Continuous>  dextrose 5%. 1000 milliLiter(s) (100 mL/Hr) IV Continuous <Continuous>  dextrose 50% Injectable 25 Gram(s) IV Push once  dextrose 50% Injectable 12.5 Gram(s) IV Push once  dextrose 50% Injectable 25 Gram(s) IV Push once  enoxaparin Injectable 40 milliGRAM(s) SubCutaneous every 24 hours  ferrous    sulfate 325 milliGRAM(s) Oral daily  folic acid 1 milliGRAM(s) Oral daily  gabapentin 600 milliGRAM(s) Oral at bedtime  gabapentin 400 milliGRAM(s) Oral daily  gabapentin 400 milliGRAM(s) Oral <User Schedule>  glucagon  Injectable 1 milliGRAM(s) IntraMuscular once  hydrALAZINE 25 milliGRAM(s) Oral at bedtime  influenza  Vaccine (HIGH DOSE) 0.7 milliLiter(s) IntraMuscular once  insulin glargine Injectable (LANTUS) 50 Unit(s) SubCutaneous at bedtime  insulin lispro (ADMELOG) corrective regimen sliding scale   SubCutaneous three times a day before meals  insulin lispro (ADMELOG) corrective regimen sliding scale   SubCutaneous at bedtime  multivitamin 1 Tablet(s) Oral daily  oxybutynin 5 milliGRAM(s) Oral daily  pantoprazole    Tablet 40 milliGRAM(s) Oral before breakfast  polyethylene glycol 3350 17 Gram(s) Oral daily  pyridoxine 50 milliGRAM(s) Oral daily  senna 2 Tablet(s) Oral at bedtime  sodium chloride 1 Gram(s) Oral two times a day  tamsulosin 0.4 milliGRAM(s) Oral at bedtime  thiamine 100 milliGRAM(s) Oral daily  torsemide 10 milliGRAM(s) Oral daily  valsartan 320 milliGRAM(s) Oral daily    MEDICATIONS  (PRN):  dextrose Oral Gel 15 Gram(s) Oral once PRN Blood Glucose LESS THAN 70 milliGRAM(s)/deciliter  guaifenesin/dextromethorphan Oral Liquid 10 milliLiter(s) Oral every 4 hours PRN Cough  morphine   Solution 5 milliGRAM(s) Oral every 6 hours PRN Moderate Pain (4 - 6)  sodium chloride 0.65% Nasal 1 Spray(s) Both Nostrils every 2 hours PRN Nasal Congestion    Allergies    No Known Allergies    Intolerances      Social: no smoking, no alcohol, no illegal drugs; no recent travel, no exposure to TB  FAMILY HISTORY:  FH: diabetes mellitus       ROS unable to obtain secondary to patient medical condition     Vital Signs Last 24 Hrs  T(C): 37.1 (15 Nov 2022 16:30), Max: 37.2 (2022 19:31)  T(F): 98.7 (15 Nov 2022 16:30), Max: 99 (2022 20:21)  HR: 71 (15 Nov 2022 16:30) (71 - 88)  BP: 125/45 (15 Nov 2022 16:30) (122/47 - 152/52)  BP(mean): 80 (2022 20:21) (80 - 80)  RR: 18 (15 Nov 2022 16:30) (16 - 18)  SpO2: 91% (15 Nov 2022 16:30) (91% - 99%)    Parameters below as of 15 Nov 2022 16:30  Patient On (Oxygen Delivery Method): room air      Daily     Daily     PE:    Constitutional: frail looking  HEENT: NC/AT, EOMI, PERRLA, conjunctivae clear; ears and nose atraumatic; pharynx clear  Neck: supple; thyroid not palpable  Back: no tenderness  Respiratory: respiratory effort normal; clear to auscultation  Cardiovascular: S1S2 regular, no murmurs  Abdomen: soft, not tender, not distended, positive BS; no liver or spleen organomegaly  Genitourinary: no suprapubic tenderness  Musculoskeletal: no muscle tenderness, no joint swelling or tenderness  Neurological/ Psychiatric: moving all extremities  Skin: no rashes; no palpable lesions    Labs: all available labs reviewed                        9.9    5.49  )-----------( 146      ( 2022 12:15 )             30.7     11-14    129<L>  |  94<L>  |  14  ----------------------------<  182<H>  4.2   |  28  |  1.09    Ca    9.7      2022 12:15    TPro  7.4  /  Alb  3.4  /  TBili  0.4  /  DBili  x   /  AST  19  /  ALT  20  /  AlkPhos  85  11-14     LIVER FUNCTIONS - ( 2022 12:15 )  Alb: 3.4 g/dL / Pro: 7.4 gm/dL / ALK PHOS: 85 U/L / ALT: 20 U/L / AST: 19 U/L / GGT: x           Urinalysis Basic - ( 2022 12:50 )    Color: Yellow / Appearance: Clear / S.005 / pH: x  Gluc: x / Ketone: Negative  / Bili: Negative / Urobili: Negative   Blood: x / Protein: Negative / Nitrite: Positive   Leuk Esterase: Moderate / RBC: 3-5 /HPF / WBC 11-25   Sq Epi: x / Non Sq Epi: Occasional / Bacteria: Moderate      Respiratory Viral Panel with COVID-19 by [email or web address] (22 @ 12:15)    Rapid RVP Result: Detected    SARS-CoV-2: NotDetec: This Respiratory Panel uses polymerase chain reaction (PCR) to detect for  adenovirus; coronavirus (HKU1, NL63, 229E, OC43); human metapneumovirus  (hMPV); human enterovirus/rhinovirus (Entero/RV); influenza A; influenza  A/H1; influenza A/H3; influenza A/H1-2009; influenza B; parainfluenza  viruses 1, 2, 3, 4; respiratory syncytial virus; Mycoplasma pneumoniae;  Chlamydophila pneumoniae; and SARS-CoV-2.    Parainfluenza 4 (RapRVP): Detected      < from: Xray Chest 1 View- PORTABLE-Routine (Xray Chest 1 View- PORTABLE-Routine .) (11.14.22 @ 13:13) >  IMPRESSION:   Constellation of findings suggestive of CHF..    < end of copied text >          Radiology: all available radiological tests reviewed    Advanced directives addressed: full resuscitation

## 2022-11-15 NOTE — DIETITIAN INITIAL EVALUATION ADULT - NSFNSGIIOFT_GEN_A_CORE
I&O's Detail    14 Nov 2022 07:01  -  15 Nov 2022 07:00  --------------------------------------------------------  IN:  Total IN: 0 mL    OUT:    Indwelling Catheter - Urethral (mL): 950 mL  Total OUT: 950 mL    Total NET: -950 mL

## 2022-11-15 NOTE — SWALLOW BEDSIDE ASSESSMENT ADULT - SWALLOW EVAL: PROGNOSIS
2) The pt was alert. Her affect was flat. Pt was interactive but communicatively passive. Pt was able to verbalize during communicative probes in [facility]. Daughter served as . When pt did verbalize, her motor speech abilities were felt to be functional and her verbalizations were linguistically intact. Pt was able to verbalize needs in [facility] and daughter felt that pt is at usual speech-language state.

## 2022-11-15 NOTE — GOALS OF CARE CONVERSATION - ADVANCED CARE PLANNING - CONVERSATION DETAILS
HPI:  Pt is a pleasant 71 y/o female largely bed bound with PMHx of CVA about 9 years ago on Aspirin/statin, chronic hyponatremia on salt tablets, Diabetes w/diabetic neuropathy,  Anemia of chronic disease,  Multifocal Pna 2 /2021, Gall bladder CA, and abd pain who was previously on home hospice and now presents to [place]  ED c/o worsening weakness  and increasing abd pain.  Pt's daughter reported pt has been having foul smelling urine and a dry cough.   Pt also had poor PO intake, and no bowel movements for 5 days.  Her daughter reports she has been taking Bactrim for 5 days without improvement.  Pt previously responded well to Milk of Magnesia,  however lately it has not helped her move her bowels.   Sick contact : pt's son-in-law has recently had a cough.    (14 Nov 2022 17:12)      PERTINENT PMH REVIEWED:  [ X ] YES [ ] NO           Primary Contact:  keely Martellr, surrogate, [phone]    HCP [  ] Surrogate [ X  ] Guardian [   ]    Mental Status: [X ] Alert  [X  ] Oriented [  ] Confused [  ] Lethargic [  ] -defers to daughter  Concerns of Depression [  ] -not identified.  Anxiety [   ]  -not identified.  Baseline ADLs (prior to admission):  Independent [ ] moderately [ ] fully   Dependent   [ ] moderately [ X]fully    Family Meeting attendees: GOC held with daughter via telephone 11/15.    Anticipated Grief: Patient[ X ] Family [ X ]    Caregiver [place] Assessed: Yes [X  ] No [  ]    Jewish: Unknown.    Spiritual Concerns: Not identified,  available for support.    Goals of Care: Comfort [X  ] Rehabilitation [  ] Curative [  ] Life Prolonging [  ]    Previous Services: Hospice at home through [facility]    ADVANCE DIRECTIVES:    -Pt. has capacity  -Pt. defers to daughter for decisions  -MOLST : DNR/DNI    Anticipated D/C Plan: Return home with hospice through [facility]                     Summary:  Palliative team spoke with daughter via telephone to discuss GOC, assist with planning and provide supportive counseling.  Palliative role explained.  Emotional support provided.  Pt. deferred to daughter for decisions.  Pt. resides at home with daughter, daughter state she is weak and bedbound.  Receives assistance with all ADLs.  Daughters feelings explored.  Support provided. HPI:  Pt is a pleasant 71 y/o female largely bed bound with PMHx of CVA about 9 years ago on Aspirin/statin, chronic hyponatremia on salt tablets, Diabetes w/diabetic neuropathy,  Anemia of chronic disease,  Multifocal Pna 2 /2021, Gall bladder CA, and abd pain who was previously on home hospice and now presents to [place]  ED c/o worsening weakness  and increasing abd pain.  Pt's daughter reported pt has been having foul smelling urine and a dry cough.   Pt also had poor PO intake, and no bowel movements for 5 days.  Her daughter reports she has been taking Bactrim for 5 days without improvement.  Pt previously responded well to Milk of Magnesia,  however lately it has not helped her move her bowels.   Sick contact : pt's son-in-law has recently had a cough.    (14 Nov 2022 17:12)      PERTINENT PMH REVIEWED:  [ X ] YES [ ] NO           Primary Contact:  keely Martellr, surrogate, [phone]    HCP [  ] Surrogate [ X  ] Guardian [   ]    Mental Status: [X ] Alert  [X  ] Oriented [  ] Confused [  ] Lethargic [  ] -defers to daughter  Concerns of Depression [  ] -not identified.  Anxiety [   ]  -not identified.  Baseline ADLs (prior to admission):  Independent [ ] moderately [ ] fully   Dependent   [ ] moderately [ X]fully    Family Meeting attendees: GOC held with daughter via telephone 11/15.    Anticipated Grief: Patient[ X ] Family [ X ]    Caregiver [employer] Assessed: Yes [X  ] No [  ]    Evangelical: Unknown.    Spiritual Concerns: Not identified,  available for support.    Goals of Care: Comfort [X  ] Rehabilitation [  ] Curative [  ] Life Prolonging [  ]    Previous Services: Hospice at home through [facility]    ADVANCE DIRECTIVES:    -Pt. has capacity  -Pt. defers to daughter for decisions  -MOLST : DNR/DNI    Anticipated D/C Plan: Return home with hospice through [facility]                     Summary:  Palliative team spoke with daughter via telephone to discuss GOC, assist with planning and provide supportive counseling.  Palliative role explained.  Emotional support provided.  Pt. deferred to daughter for decisions.  Pt. resides at home with daughter, daughter state she is very weak and has been declining.  Daughter states Pt is bedbound, receives assistance with all ADLs.  Daughters feelings explored.  Support provided.    Daughter shared Pts journey thus far with her illness.  We discussed Pts current medical condition including dx and prognosis.  Daughter shared that Pt has been getting UTIs about every 2 months recently and has ended up at [facility] each time for IV medications.  Daughter shared with us that Pt. is at home with the support of hospice through [facility].  Daughter aware of the philosophy of hospice and desires to focus on her moms comfort.  She explained that oral medications were not working at home for Pts current UTI therefore hospice stated to go to [facility] as Pt was complaining of discomfort.      Advance directives reviewed.  Pt. deferred to daughter to assist with decisions.  MOLST on file to reflect DNR/DNI.      Plan to return home with hospice through [facility].  Emotional support provided.  Our team to continue to follow.

## 2022-11-15 NOTE — DIETITIAN INITIAL EVALUATION ADULT - ORAL INTAKE PTA/DIET HISTORY
Pt on home hospice & living w/ daughter and son; hard to understand pt & unable to obtain any other information. Poor po intake doc'd in EMR x ? time frame 2/2 weakness, fatigue

## 2022-11-15 NOTE — DIETITIAN INITIAL EVALUATION ADULT - PERTINENT MEDS FT
MEDICATIONS  (STANDING):  artificial  tears Solution 1 Drop(s) Both EYES four times a day  aspirin enteric coated 81 milliGRAM(s) Oral daily  azithromycin  IVPB      azithromycin  IVPB 500 milliGRAM(s) IV Intermittent every 24 hours  calcium carbonate    500 mG (Tums) Chewable 1 Tablet(s) Chew two times a day  cefTRIAXone Injectable. 1000 milliGRAM(s) IV Push every 24 hours  cholecalciferol 2000 Unit(s) Oral daily  cyanocobalamin 2000 MICROGram(s) Oral daily  dextrose 5%. 1000 milliLiter(s) (50 mL/Hr) IV Continuous <Continuous>  dextrose 5%. 1000 milliLiter(s) (100 mL/Hr) IV Continuous <Continuous>  dextrose 50% Injectable 25 Gram(s) IV Push once  dextrose 50% Injectable 12.5 Gram(s) IV Push once  dextrose 50% Injectable 25 Gram(s) IV Push once  enoxaparin Injectable 40 milliGRAM(s) SubCutaneous every 24 hours  ferrous    sulfate 325 milliGRAM(s) Oral daily  folic acid 1 milliGRAM(s) Oral daily  gabapentin 600 milliGRAM(s) Oral at bedtime  gabapentin 400 milliGRAM(s) Oral daily  gabapentin 400 milliGRAM(s) Oral <User Schedule>  glucagon  Injectable 1 milliGRAM(s) IntraMuscular once  hydrALAZINE 25 milliGRAM(s) Oral at bedtime  influenza  Vaccine (HIGH DOSE) 0.7 milliLiter(s) IntraMuscular once  insulin glargine Injectable (LANTUS) 50 Unit(s) SubCutaneous at bedtime  insulin lispro (ADMELOG) corrective regimen sliding scale   SubCutaneous three times a day before meals  insulin lispro (ADMELOG) corrective regimen sliding scale   SubCutaneous at bedtime  multivitamin 1 Tablet(s) Oral daily  oxybutynin 5 milliGRAM(s) Oral daily  pantoprazole    Tablet 40 milliGRAM(s) Oral before breakfast  polyethylene glycol 3350 17 Gram(s) Oral daily  pyridoxine 50 milliGRAM(s) Oral daily  senna 2 Tablet(s) Oral at bedtime  sodium chloride 1 Gram(s) Oral two times a day  tamsulosin 0.4 milliGRAM(s) Oral at bedtime  thiamine 100 milliGRAM(s) Oral daily  torsemide 10 milliGRAM(s) Oral daily  valsartan 320 milliGRAM(s) Oral daily    MEDICATIONS  (PRN):  dextrose Oral Gel 15 Gram(s) Oral once PRN Blood Glucose LESS THAN 70 milliGRAM(s)/deciliter  guaifenesin/dextromethorphan Oral Liquid 10 milliLiter(s) Oral every 4 hours PRN Cough  morphine   Solution 5 milliGRAM(s) Oral every 6 hours PRN Moderate Pain (4 - 6)  sodium chloride 0.65% Nasal 1 Spray(s) Both Nostrils every 2 hours PRN Nasal Congestion

## 2022-11-15 NOTE — PROGRESS NOTE ADULT - ASSESSMENT
71 y/o female w/ pmhx of CVA, DM 2, recent dx of GB Ca present from home with home hospice for eval of progressive weakness and decreased PO intake as per daughter.       Weakness and failure to thrive multifactorial due to UTI / Pyelonephritis/ parainfluenza and community acquired PNA   - s/p 1 L NS in the ED  - cont w/ Rocephin, add Zithromax   - droplet isolation  - elevated lactate , likely due to cirrhosis - not septic   - blood and sputum cx pending   - antitussives prn   - Pulm consult  - swallow eval  - Palliative care consult    Chronic constipation   - CT scan with generalized colonic fecal retention.   - add Miralax and lactulose     Chronic Hyponatremia   - was 129 on prior labs since Feb 2021   - cont w/ salt tabs  - trend in AM labs     Diabetes mellitus type 2   - ISS and lantus  - on regular diet     Hx of gallbladder CA / liver cirrhosis   - acc to daughter no chemo or radiation therapies available.   - on home hospice care   - supportive care  - analgesics and antiemetics PRN       - DVT proph: lovenox  - DNR, MOLST completed with pt's daughter who is the HCP         73 y/o female w/ pmhx of CVA, DM 2, recent dx of GB Ca present from home with home hospice for eval of progressive weakness and decreased PO intake as per daughter.       Weakness and failure to thrive multifactorial due to UTI / Pyelonephritis/ parainfluenza and community acquired PNA   - UTI associated with bruce catheter   - s/p 1 L NS in the ED  - cont w/ Rocephin, add Zithromax   - droplet isolation  - elevated lactate , likely due to cirrhosis - not septic   - blood and sputum cx pending   - antitussives prn   - Pulm consult  - swallow eval  - Palliative care consult    Chronic constipation   - CT scan with generalized colonic fecal retention.   - add Miralax and lactulose     Chronic Hyponatremia   - was 129 on prior labs since Feb 2021   - cont w/ salt tabs  - trend in AM labs     Diabetes mellitus type 2   - ISS and lantus  - on regular diet     Hx of gallbladder CA / liver cirrhosis   - acc to daughter no chemo or radiation therapies available.   - on home hospice care   - supportive care  - analgesics and antiemetics PRN       - DVT proph: lovenox  - DNR, MOLST completed with pt's daughter who is the HCP

## 2022-11-15 NOTE — SWALLOW BEDSIDE ASSESSMENT ADULT - SWALLOW EVAL: CRITERIA FOR SKILLED INTERVENTION MET
DO NOT FEEL THAT ACUTE SPEECH PATHOLOGY FOLLOW UP WOULD CHANGE CLINICAL MANAGEMENT/OUTCOME IN HOSPITAL. PT'S SPEECH-LANGUAGE ABILITIES AND OROPHARYNGEAL SWALLOWING ABILITIES ARE FUNCTIONAL/AT USUAL STATE. COMMUNICATIVE AND SWALLOWING INTEGRITY ARE FELT TO BE AT BASELINE. GIVEN ABOVE, THIS SERVICE WILL NOT ACTIVELY FOLLOW. RECONSULT PRN SHOULD STATUS CHANGE AND CONDITION WARRANT.

## 2022-11-15 NOTE — CONSULT NOTE ADULT - CONSULT REASON
complex goals of care and symptom management
pneumonia
complex goals of care and symptom management
sob

## 2022-11-15 NOTE — PROVIDER CONTACT NOTE (OTHER) - SITUATION
notified Dr Garcia's office of admission please fax over discharge paperwork once patient is discharged to [phone]

## 2022-11-15 NOTE — DIETITIAN INITIAL EVALUATION ADULT - ADD RECOMMEND
1) Continue w/ a regular diet as tolerated  2) Add ensure + HP shake TID  3) Monitor bowel movements, if no BM for >3 days, consider implementing STRONGER bowel regimen.  4) MVI w/ minerals daily to ensure 100% RDA met  5) Consider adding thiamine 200 mg daily 2/2 poor PO intake/ malnutrition  6) Encourage protein-rich foods, maximize food preferences  7) Meal encouragement; tray set-up to optimize po intake  8) Confirm goals of care regarding nutrition support***  RD will continue to monitor PO intake, labs, hydration, and wt prn.

## 2022-11-15 NOTE — CONSULT NOTE ADULT - ASSESSMENT
Process of Care  --Reviewed dx/treatment problems and alignment with Goals of Care    complicated UTI - pyelonephritis parainfluenza w/ possible CAP -PNA  c/w abx as per primary  pt on home hospice     Chronic constipation  Miralax and Lactulose    Hypnoatremia  c/w management      Diabetes mellitus type 2   c/w current managment    Hx of gallbladder CA / liver cirrhosis   cw current management        Physical Aspects of Care  --Pain  patient denies at this time  c/w current management    --Bowel Regimen  denies constipation  risk for constipation d/t immobility  daily dulcolax    --Dyspnea  No SOB at this time  comfortable and in NAD    --Nausea Vomiting  denies    --Weakness  PT as tolerated     Psychological and Psychiatric Aspects of Care:   Grief Bereavement emotional support provided  --Hx of psychiatric dx: none  -Pastoral Care Available PRN     Social Aspects of Care  -SW involved     Cultural Aspects  -Primary Language: English    Goals of Care:     We discussed Palliative Care team being a supportive team when a patient has ongoing illnesses.  We also discussed that it is not an end of life care service, but can help navigate symptoms and emotional support througout their hospital stay here.    Hospice was explained as well  as an end of life care philosophy.  When a disease cannot be cured, or family/patient decide the treatment burdens out weigh the risk and one choses to change focus of treatment from cure to quality/comfort.   please refer to above GOC section for further input      Prognosis: Death can occur at anytime, but if disease continues to progress naturally patient likely has days to weeks.    Ethical and Legal Aspects:   NA        Capacity: pt w/ capacity defers to daughter  HCP/Surrogate: Lou Gimenez  Code Status: dnr dni  MOLST: dnr dni   Dispo Plan: return to home hospice    Discussed With: Case coordinated with attending and SW and RN     Time Spent: 75 minutes including the care, coordination and counseling of this patient, 50% of which was spent coordinating and counseling.

## 2022-11-15 NOTE — SWALLOW BEDSIDE ASSESSMENT ADULT - SWALLOW EVAL: RECOMMENDED DIET
SUGGEST A REGULAR TEXTURE DIET WITH THIN LIQUID CONSISTENCIES AS THE PATIENT APPEARED CLINICALLY TOLERANT OF THESE FOOD CONSISTENCIES FROM AN OROPHARYNGEAL SWALLOWING STANCE ON CLINICAL EXAM.

## 2022-11-15 NOTE — CONSULT NOTE ADULT - SUBJECTIVE AND OBJECTIVE BOX
HPI:    71 y/o female largely bed bound with PMHx of CVA about 9 years ago on Aspirin/statin, chronic hyponatremia on salt tablets, Diabetes w/diabetic neuropathy,  Anemia of chronic disease,  Multifocal Pna , Gall bladder CA, and abd pain who was previously on home hospice and now presents to [place]  ED c/o worsening weakness  and increasing abd pain.  Pt's daughter reported pt has been having foul smelling urine and a dry cough.   Pt also had poor PO intake, and no bowel movements for 5 days.  Her daughter reports she has been taking Bactrim for 5 days without improvement.  Pt previously responded well to Milk of Magnesia,  however lately it has not helped her move her bowels.   Sick contact : pt's son-in-law has recently had a cough. Pt denies chest pain, no SOB, no v/d,  no calf pain.  Pt reports some dysuria.  No specific new weakness of the extremities. pat is positive parainfluenza, seen for pulmonary eval.      PAST MEDICAL & SURGICAL HISTORY:  CVA (cerebral vascular accident)      Diabetes mellitus      History of cholecystectomy          Home Medications:  Artificial Tears ophthalmic solution: 1 drop(s) to each affected eye 4 times a day, As Needed (2022 21:40)  Aspirin Enteric Coated 81 mg oral delayed release tablet: 1 tab(s) orally once a day (2022 21:40)  Caltrate 600 mg oral tablet: 1 tab(s) orally 2 times a day (2022 21:40)  cyanocobalamin 1000 mcg oral tablet: 2 tab(s) orally once a day (2022 21:40)  ferrous sulfate 325 mg (65 mg elemental iron) oral tablet: 1 tab(s) orally once a day (2022 21:40)  folic acid 1 mg oral tablet: 1 tab(s) orally once a day (2022 21:40)  gabapentin 300 mg oral capsule: 2 cap(s) orally once a day (at bedtime) (2022 21:40)  gabapentin 400 mg oral capsule: 1 cap(s) orally 2 times a day in the morning and with lunch (2022 21:40)  hydrALAZINE 25 mg oral tablet: 1 tab(s) orally once a day (at bedtime) (2022 21:40)  Januvia 100 mg oral tablet: 1 tab(s) orally once a day (2022 21:40)  Levemir 100 units/mL subcutaneous solution: 68 unit(s) subcutaneously once a day (at bedtime) (2022 21:40)  metFORMIN 1000 mg oral tablet: 1 tab(s) orally once a day (2022 21:40)  morphine: 5 milligram(s) orally every 6 hours, As Needed (2022 21:40)  Multiple Vitamins oral tablet: 1 tab(s) orally once a day (2022 21:40)  pantoprazole 20 mg oral delayed release tablet: 1 tab(s) orally once a day (2022 21:40)  Saline Nasal Mist 0.65% nasal solution: 2 spray(s) nasal every 2 hours, As Needed (2022 21:40)  senna 8.6 mg oral tablet: 2 tab(s) orally once a day (at bedtime) (2022 21:40)  sodium chloride 1 g oral tablet: 1 tab(s) orally 2 times a day (2022 21:40)  solifenacin 10 mg oral tablet: 1 tab(s) orally once a day (2022 21:40)  valsartan 320 mg oral tablet: 1 tab(s) orally once a day (2022 21:40)  Vitamin B1 100 mg oral tablet: 1 tab(s) orally once a day (2022 21:40)  Vitamin B6 50 mg oral tablet: 1 tab(s) orally once a day (2022 21:40)  Vitamin D3 25 mcg (1000 intl units) oral tablet: 1 tab(s) orally once a day (2022 21:40)      MEDICATIONS  (STANDING):  artificial  tears Solution 1 Drop(s) Both EYES four times a day  aspirin enteric coated 81 milliGRAM(s) Oral daily  azithromycin  IVPB      azithromycin  IVPB 500 milliGRAM(s) IV Intermittent every 24 hours  calcium carbonate    500 mG (Tums) Chewable 1 Tablet(s) Chew two times a day  cefTRIAXone Injectable. 1000 milliGRAM(s) IV Push every 24 hours  cholecalciferol 2000 Unit(s) Oral daily  cyanocobalamin 2000 MICROGram(s) Oral daily  dextrose 5%. 1000 milliLiter(s) (50 mL/Hr) IV Continuous <Continuous>  dextrose 5%. 1000 milliLiter(s) (100 mL/Hr) IV Continuous <Continuous>  dextrose 50% Injectable 25 Gram(s) IV Push once  dextrose 50% Injectable 12.5 Gram(s) IV Push once  dextrose 50% Injectable 25 Gram(s) IV Push once  enoxaparin Injectable 40 milliGRAM(s) SubCutaneous every 24 hours  ferrous    sulfate 325 milliGRAM(s) Oral daily  folic acid 1 milliGRAM(s) Oral daily  gabapentin 600 milliGRAM(s) Oral at bedtime  gabapentin 400 milliGRAM(s) Oral daily  gabapentin 400 milliGRAM(s) Oral <User Schedule>  glucagon  Injectable 1 milliGRAM(s) IntraMuscular once  hydrALAZINE 25 milliGRAM(s) Oral at bedtime  influenza  Vaccine (HIGH DOSE) 0.7 milliLiter(s) IntraMuscular once  insulin glargine Injectable (LANTUS) 50 Unit(s) SubCutaneous at bedtime  insulin lispro (ADMELOG) corrective regimen sliding scale   SubCutaneous three times a day before meals  insulin lispro (ADMELOG) corrective regimen sliding scale   SubCutaneous at bedtime  multivitamin 1 Tablet(s) Oral daily  oxybutynin 5 milliGRAM(s) Oral daily  pantoprazole    Tablet 40 milliGRAM(s) Oral before breakfast  polyethylene glycol 3350 17 Gram(s) Oral daily  pyridoxine 50 milliGRAM(s) Oral daily  senna 2 Tablet(s) Oral at bedtime  sodium chloride 1 Gram(s) Oral two times a day  tamsulosin 0.4 milliGRAM(s) Oral at bedtime  thiamine 100 milliGRAM(s) Oral daily  torsemide 10 milliGRAM(s) Oral daily  valsartan 320 milliGRAM(s) Oral daily    MEDICATIONS  (PRN):  dextrose Oral Gel 15 Gram(s) Oral once PRN Blood Glucose LESS THAN 70 milliGRAM(s)/deciliter  guaifenesin/dextromethorphan Oral Liquid 10 milliLiter(s) Oral every 4 hours PRN Cough  morphine   Solution 5 milliGRAM(s) Oral every 6 hours PRN Moderate Pain (4 - 6)  sodium chloride 0.65% Nasal 1 Spray(s) Both Nostrils every 2 hours PRN Nasal Congestion      Allergies    No Known Allergies    Intolerances        SOCIAL HISTORY: Denies tobacco, etoh abuse or illicit drug use    FAMILY HISTORY:  FH: diabetes mellitus        Vital Signs Last 24 Hrs  T(C): 37.2 (2022 20:21), Max: 37.2 (2022 13:41)  T(F): 99 (2022 20:21), Max: 99 (2022 13:41)  HR: 76 (2022 20:21) (76 - 88)  BP: 152/52 (2022 20:21) (131/81 - 152/52)  BP(mean): 80 (:21) (80 - 80)  RR: 18 (2022 20:21) (16 - 18)  SpO2: 95% (2022 20:21) (95% - 99%)    Parameters below as of :  Patient On (Oxygen Delivery Method): room air            REVIEW OF SYSTEMS:    CONSTITUTIONAL:  As per HPI.  HEENT:  Eyes:  No diplopia or blurred vision. ENT:  No earache, sore throat or runny nose.  CARDIOVASCULAR:  No pressure, squeezing, tightness, heaviness or aching about the chest, neck, axilla or epigastrium.  RESPIRATORY:  No cough, shortness of breath, PND or orthopnea.  GASTROINTESTINAL:  No nausea, vomiting or diarrhea.  GENITOURINARY:  No dysuria, frequency or urgency.  MUSCULOSKELETAL:  As per HPI.  SKIN:  No change in skin, hair or nails.  NEUROLOGIC:  No paresthesias, fasciculations, seizures or weakness.  PSYCHIATRIC:  No disorder of thought or mood.  ENDOCRINE:  No heat or cold intolerance, polyuria or polydipsia.  HEMATOLOGICAL:  No easy bruising or bleedings:  .     PHYSICAL EXAMINATION:    GENERAL APPEARANCE:  Pt. is not currently dyspneic, in no distress. Pt. is alert, oriented, and pleasant.  HEENT:  Pupils are normal and react normally. No icterus. Mucous membranes well colored.  NECK:  Supple. No lymphadenopathy. Jugular venous pressure not elevated. Carotids equal.   HEART:   The cardiac impulse has a normal quality. Regular. Normal S1 and S2. There are no murmurs, rubs or gallops noted  CHEST:  Chest is clear to auscultation. Normal respiratory effort.  ABDOMEN:  Soft and nontender.   EXTREMITIES:  There is no cyanosis, clubbing or edema.   SKIN:  No rash or significant lesions are noted.    LABS:                        9.9    5.49  )-----------( 146      ( 2022 12:15 )             30.7     11-14    129<L>  |  94<L>  |  14  ----------------------------<  182<H>  4.2   |  28  |  1.09    Ca    9.7      2022 12:15    TPro  7.4  /  Alb  3.4  /  TBili  0.4  /  DBili  x   /  AST  19  /  ALT  20  /  AlkPhos  85  11-14    LIVER FUNCTIONS - ( 2022 12:15 )  Alb: 3.4 g/dL / Pro: 7.4 gm/dL / ALK PHOS: 85 U/L / ALT: 20 U/L / AST: 19 U/L / GGT: x           PT/INR - ( 2022 12:15 )   PT: 13.2 sec;   INR: 1.14 ratio         PTT - ( 2022 12:15 )  PTT:32.6 sec      Urinalysis Basic - ( 2022 12:50 )    Color: Yellow / Appearance: Clear / S.005 / pH: x  Gluc: x / Ketone: Negative  / Bili: Negative / Urobili: Negative   Blood: x / Protein: Negative / Nitrite: Positive   Leuk Esterase: Moderate / RBC: 3-5 /HPF / WBC 11-25   Sq Epi: x / Non Sq Epi: Occasional / Bacteria: Moderate      RADIOLOGY & ADDITIONAL STUDIES:     CT Abdomen and Pelvis w/ IV Cont (22 @ 15:13) >  IMPRESSION:    Bilateral pleural effusions.  Small focus ofair in the pleural space of the right posterior   costophrenic angle.  Septal thickening, could reflect interstitial edema.  Partially imaged airspace consolidation right middle left lingula lobe,   may reflect infection/pneumonia.  Right paracardiac/epiphrenic lymph nodes, stable.    Recommend further evaluation with CT scan of the chest.    Possible cirrhotic morphology.  Poorly defined low-attenuation posterior right hepatic lobe.  Recommend further evaluation with MRI if there are no clinical  contraindications, to exclude discrete hepatic mass.    Mild splenomegaly.    Generalized colonic fecal retention.    Patchy decreased enhancement midpole right kidney may be associated with   infection/pyelonephritis.    Heterogeneous appearance of the uterus which is deviated to the right.  Recommend further evaluation with pelvic ultrasonography.         HPI:    73 y/o female largely bed bound with PMHx of CVA about 9 years ago on Aspirin/statin, chronic hyponatremia on salt tablets, Diabetes w/diabetic neuropathy,  Anemia of chronic disease,  Multifocal Pna , Gall bladder CA, and abd pain who was previously on home hospice and now presents to [place]  ED c/o worsening weakness  and increasing abd pain.  Pt's daughter reported pt has been having foul smelling urine and a dry cough.   Pt also had poor PO intake, and no bowel movements for 5 days.  Her daughter reports she has been taking Bactrim for 5 days without improvement.  Pt previously responded well to Milk of Magnesia,  however lately it has not helped her move her bowels.   Sick contact : pt's son-in-law has recently had a cough. Pt denies chest pain, no SOB, no v/d,  no calf pain.  Pt reports some dysuria.  No specific new weakness of the extremities. pat is positive parainfluenza, seen for pulmonary eval. In isolation.      PAST MEDICAL & SURGICAL HISTORY:  CVA (cerebral vascular accident)      Diabetes mellitus      History of cholecystectomy          Home Medications:  Artificial Tears ophthalmic solution: 1 drop(s) to each affected eye 4 times a day, As Needed (2022 21:40)  Aspirin Enteric Coated 81 mg oral delayed release tablet: 1 tab(s) orally once a day (2022 21:40)  Caltrate 600 mg oral tablet: 1 tab(s) orally 2 times a day (2022 21:40)  cyanocobalamin 1000 mcg oral tablet: 2 tab(s) orally once a day (2022 21:40)  ferrous sulfate 325 mg (65 mg elemental iron) oral tablet: 1 tab(s) orally once a day (2022 21:40)  folic acid 1 mg oral tablet: 1 tab(s) orally once a day (2022 21:40)  gabapentin 300 mg oral capsule: 2 cap(s) orally once a day (at bedtime) (2022 21:40)  gabapentin 400 mg oral capsule: 1 cap(s) orally 2 times a day in the morning and with lunch (2022 21:40)  hydrALAZINE 25 mg oral tablet: 1 tab(s) orally once a day (at bedtime) (2022 21:40)  Januvia 100 mg oral tablet: 1 tab(s) orally once a day (2022 21:40)  Levemir 100 units/mL subcutaneous solution: 68 unit(s) subcutaneously once a day (at bedtime) (2022 21:40)  metFORMIN 1000 mg oral tablet: 1 tab(s) orally once a day (2022 21:40)  morphine: 5 milligram(s) orally every 6 hours, As Needed (2022 21:40)  Multiple Vitamins oral tablet: 1 tab(s) orally once a day (2022 21:40)  pantoprazole 20 mg oral delayed release tablet: 1 tab(s) orally once a day (2022 21:40)  Saline Nasal Mist 0.65% nasal solution: 2 spray(s) nasal every 2 hours, As Needed (2022 21:40)  senna 8.6 mg oral tablet: 2 tab(s) orally once a day (at bedtime) (2022 21:40)  sodium chloride 1 g oral tablet: 1 tab(s) orally 2 times a day (2022 21:40)  solifenacin 10 mg oral tablet: 1 tab(s) orally once a day (2022 21:40)  valsartan 320 mg oral tablet: 1 tab(s) orally once a day (2022 21:40)  Vitamin B1 100 mg oral tablet: 1 tab(s) orally once a day (2022 21:40)  Vitamin B6 50 mg oral tablet: 1 tab(s) orally once a day (2022 21:40)  Vitamin D3 25 mcg (1000 intl units) oral tablet: 1 tab(s) orally once a day (2022 21:40)      MEDICATIONS  (STANDING):  artificial  tears Solution 1 Drop(s) Both EYES four times a day  aspirin enteric coated 81 milliGRAM(s) Oral daily  azithromycin  IVPB      azithromycin  IVPB 500 milliGRAM(s) IV Intermittent every 24 hours  calcium carbonate    500 mG (Tums) Chewable 1 Tablet(s) Chew two times a day  cefTRIAXone Injectable. 1000 milliGRAM(s) IV Push every 24 hours  cholecalciferol 2000 Unit(s) Oral daily  cyanocobalamin 2000 MICROGram(s) Oral daily  dextrose 5%. 1000 milliLiter(s) (50 mL/Hr) IV Continuous <Continuous>  dextrose 5%. 1000 milliLiter(s) (100 mL/Hr) IV Continuous <Continuous>  dextrose 50% Injectable 25 Gram(s) IV Push once  dextrose 50% Injectable 12.5 Gram(s) IV Push once  dextrose 50% Injectable 25 Gram(s) IV Push once  enoxaparin Injectable 40 milliGRAM(s) SubCutaneous every 24 hours  ferrous    sulfate 325 milliGRAM(s) Oral daily  folic acid 1 milliGRAM(s) Oral daily  gabapentin 600 milliGRAM(s) Oral at bedtime  gabapentin 400 milliGRAM(s) Oral daily  gabapentin 400 milliGRAM(s) Oral <User Schedule>  glucagon  Injectable 1 milliGRAM(s) IntraMuscular once  hydrALAZINE 25 milliGRAM(s) Oral at bedtime  influenza  Vaccine (HIGH DOSE) 0.7 milliLiter(s) IntraMuscular once  insulin glargine Injectable (LANTUS) 50 Unit(s) SubCutaneous at bedtime  insulin lispro (ADMELOG) corrective regimen sliding scale   SubCutaneous three times a day before meals  insulin lispro (ADMELOG) corrective regimen sliding scale   SubCutaneous at bedtime  multivitamin 1 Tablet(s) Oral daily  oxybutynin 5 milliGRAM(s) Oral daily  pantoprazole    Tablet 40 milliGRAM(s) Oral before breakfast  polyethylene glycol 3350 17 Gram(s) Oral daily  pyridoxine 50 milliGRAM(s) Oral daily  senna 2 Tablet(s) Oral at bedtime  sodium chloride 1 Gram(s) Oral two times a day  tamsulosin 0.4 milliGRAM(s) Oral at bedtime  thiamine 100 milliGRAM(s) Oral daily  torsemide 10 milliGRAM(s) Oral daily  valsartan 320 milliGRAM(s) Oral daily    MEDICATIONS  (PRN):  dextrose Oral Gel 15 Gram(s) Oral once PRN Blood Glucose LESS THAN 70 milliGRAM(s)/deciliter  guaifenesin/dextromethorphan Oral Liquid 10 milliLiter(s) Oral every 4 hours PRN Cough  morphine   Solution 5 milliGRAM(s) Oral every 6 hours PRN Moderate Pain (4 - 6)  sodium chloride 0.65% Nasal 1 Spray(s) Both Nostrils every 2 hours PRN Nasal Congestion      Allergies    No Known Allergies    Intolerances        SOCIAL HISTORY: Denies tobacco, etoh abuse or illicit drug use    FAMILY HISTORY:  FH: diabetes mellitus        Vital Signs Last 24 Hrs  T(C): 37.2 (2022 20:21), Max: 37.2 (2022 13:41)  T(F): 99 (2022 20:21), Max: 99 (2022 13:41)  HR: 76 (2022 20:21) (76 - 88)  BP: 152/52 (2022 20:21) (131/81 - 152/52)  BP(mean): 80 (2022 20:21) (80 - 80)  RR: 18 (:21) (16 - 18)  SpO2: 95% (:21) (95% - 99%)    Parameters below as of :  Patient On (Oxygen Delivery Method): room air            REVIEW OF SYSTEMS:    CONSTITUTIONAL:  As per HPI.  HEENT:  Eyes:  No diplopia or blurred vision. ENT:  No earache, sore throat or runny nose.  CARDIOVASCULAR:  No pressure, squeezing, tightness, heaviness or aching about the chest, neck, axilla or epigastrium.  RESPIRATORY:  No cough, +shortness of breath, PND or orthopnea.  GASTROINTESTINAL:  No nausea, vomiting or diarrhea.  GENITOURINARY:  No dysuria, frequency or urgency.  MUSCULOSKELETAL:  As per HPI.  SKIN:  No change in skin, hair or nails.  NEUROLOGIC:  No paresthesias, fasciculations, seizures or weakness.  PSYCHIATRIC:  No disorder of thought or mood.  ENDOCRINE:  No heat or cold intolerance, polyuria or polydipsia.  HEMATOLOGICAL:  No easy bruising or bleedings:  .     PHYSICAL EXAMINATION:    GENERAL APPEARANCE:  Pt. is not currently dyspneic, in no distress. Pt. is alert, oriented, and pleasant.  HEENT:  Pupils are normal and react normally. No icterus. Mucous membranes well colored.  NECK:  Supple. No lymphadenopathy. Jugular venous pressure not elevated. Carotids equal.   HEART:   The cardiac impulse has a normal quality. Regular. Normal S1 and S2. There are no murmurs, rubs or gallops noted  CHEST:  Chest crackles to auscultation. Normal respiratory effort.  ABDOMEN:  Soft and nontender.   EXTREMITIES:  There is no cyanosis, clubbing or edema.   SKIN:  No rash or significant lesions are noted.    LABS:                        9.9    5.49  )-----------( 146      ( 2022 12:15 )             30.7     11-14    129<L>  |  94<L>  |  14  ----------------------------<  182<H>  4.2   |  28  |  1.09    Ca    9.7      2022 12:15    TPro  7.4  /  Alb  3.4  /  TBili  0.4  /  DBili  x   /  AST  19  /  ALT  20  /  AlkPhos  85  11-14    LIVER FUNCTIONS - ( 2022 12:15 )  Alb: 3.4 g/dL / Pro: 7.4 gm/dL / ALK PHOS: 85 U/L / ALT: 20 U/L / AST: 19 U/L / GGT: x           PT/INR - ( 2022 12:15 )   PT: 13.2 sec;   INR: 1.14 ratio         PTT - ( 2022 12:15 )  PTT:32.6 sec      Urinalysis Basic - ( 2022 12:50 )    Color: Yellow / Appearance: Clear / S.005 / pH: x  Gluc: x / Ketone: Negative  / Bili: Negative / Urobili: Negative   Blood: x / Protein: Negative / Nitrite: Positive   Leuk Esterase: Moderate / RBC: 3-5 /HPF / WBC 11-25   Sq Epi: x / Non Sq Epi: Occasional / Bacteria: Moderate      RADIOLOGY & ADDITIONAL STUDIES:     CT Abdomen and Pelvis w/ IV Cont (22 @ 15:13) >  IMPRESSION:    Bilateral pleural effusions.  Small focus of air in the pleural space of the right posterior   costophrenic angle.  Septal thickening, could reflect interstitial edema.  Partially imaged airspace consolidation right middle left lingula lobe,   may reflect infection/pneumonia.  Right paracardiac/epiphrenic lymph nodes, stable.    Recommend further evaluation with CT scan of the chest.    Possible cirrhotic morphology.  Poorly defined low-attenuation posterior right hepatic lobe.  Recommend further evaluation with MRI if there are no clinical  contraindications, to exclude discrete hepatic mass.    Mild splenomegaly.    Generalized colonic fecal retention.    Patchy decreased enhancement midpole right kidney may be associated with   infection/pyelonephritis.    Heterogeneous appearance of the uterus which is deviated to the right.  Recommend further evaluation with pelvic ultrasonography.

## 2022-11-15 NOTE — SWALLOW BEDSIDE ASSESSMENT ADULT - NS SPL SWALLOW CLINIC TRIAL FT
Pt c/o an abdominal bloating sensation which is present both non nutritively as well as when eating. This profile is superimposed upon Oropharyngeal Swallowing abilities which subjectively appeared to be grossly within functional parameters for age. Bolus formation/transfer were felt to be mechanically functional for age and laryngeal lift on palpation during swallowing trials was felt to be functional for age as well NO behavioral aspiration signs exhibited. Odynophagia denied. No change in O2 sats noted. I do not feel that pt's c/o perceiving abdominal bloating is related to an observable oropharyngeal swallowing pathology.

## 2022-11-15 NOTE — CONSULT NOTE ADULT - ASSESSMENT
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, per daughter  Cirrhosis   DM    PLAN:    S/p 1 L NS in the ED  IV Rocephin, add Zithromax  Isolation  Blood and sputum cx  Swallow eval  ISS and lantus  Palliative CARE  DVT proph: lovenox  DNR, MOLST completed with pt's daughter who is the HCP       PROBLEMS:    UTI/Pyelonephritis  Community Acq Pna , R ML and Infr L lingula  Parainfluenza virus Positive  H/O GB Ca not a candidate for chemotherapy or radiation, per daughter  Cirrhosis   DM    PLAN:    S/p 1 L NS in the ED  IV Rocephin+ Zithromax  Isolation  Blood and sputum cx  Swallow eval  ISS and lantus  Palliative CARE  DVT proph: lovenox  DNR-MOLST completed+pt's daughter who is the HCP

## 2022-11-15 NOTE — SWALLOW BEDSIDE ASSESSMENT ADULT - SWALLOW EVAL: DIAGNOSIS
1) Pt c/o an abdominal bloating sensation which is present both non nutritively as well as when eating. This profile is superimposed upon Oropharyngeal Swallowing abilities which subjectively appeared to be grossly within functional parameters for age. NO behavioral aspiration signs exhibited. Odynophagia denied. No change in O2 sats noted. I do not feel that pt's c/o perceiving abdominal bloating is related to an observable oropharyngeal swallowing pathology.

## 2022-11-15 NOTE — CONSULT NOTE ADULT - ASSESSMENT
Pt is a  73 y/o female largely bed bound with PMHx of CVA about 9 years ago on Aspirin/statin, chronic hyponatremia on salt tablets, Diabetes w/diabetic neuropathy,  Anemia of chronic disease,  Multifocal Pna 2 /2021, Gall bladder CA, and abd pain who was previously on home hospice and now presents to [place]  ED on 11/14 for evaluation of worsening weakness, dry cough, decreased oral intake; son in law is sick with a cough. History per medical record as patient is not providing details.     1. Patient admitted with viral infection secondary to Human Parainfluenza #4 virus, possibly with superimposed pneumonia, versus chf exacerbation, as the the cause of her respiratory symptoms  - follow up cultures   - serial cbc and monitor temperature   - oxygen and nebs as needed   - iv hydration and supportive care   - reviewed prior medical records to evaluate for resistant or atypical pathogens   - will treat for community acquired pneumonia with ceftriaxone and zithromax for about 5 days  - continue droplet and contact isolation  2. other issues; per medicine

## 2022-11-15 NOTE — SWALLOW BEDSIDE ASSESSMENT ADULT - COMMENTS
The pt was admitted to  with generalized weakness, foul smelling urine, and cough. Hospital course is notable for UTI, pyelonephritis, PNA. parainfluenza, constipation and abdominal discomfort. Palliative Care is following. MOLST stated NO feeding tubes. This profile is superimposed upon a history of DM type 2, neuropathy, anemia, chronic hyponatremia, gallbladder cancer NOS and past CVA.

## 2022-11-15 NOTE — CONSULT NOTE ADULT - REASON FOR ADMISSION
Weakness  UTI, Pyelonephritis  Pna  Constipation

## 2022-11-15 NOTE — SWALLOW BEDSIDE ASSESSMENT ADULT - ADDITIONAL RECOMMENDATIONS
1) Palliative Care and Hospitalist follow up.  Pt c/o an abdominal bloating sensation which is present both non nutritively as well as when eating. This profile is superimposed upon Oropharyngeal Swallowing abilities which subjectively appeared to be grossly within functional parameters for age. Bolus formation/transfer were felt to be mechanically functional for age and laryngeal lift on palpation during swallowing trials was felt to be functional for age as well NO behavioral aspiration signs exhibited. Odynophagia denied. No change in O2 sats noted. I do not feel that pt's c/o perceiving abdominal bloating is related to an observable oropharyngeal swallowing pathology.    2) Nutrition f/u    3) Primary language is Lao. Use  services as needed if family not available.

## 2022-11-15 NOTE — PROGRESS NOTE ADULT - SUBJECTIVE AND OBJECTIVE BOX
Patient is a 72y old  Female who presents with a chief complaint of Weakness  UTI, Pyelonephritis  Pna  Constipation (15 Nov 2022 09:43)      SUBJECTIVE:   HPI:  Pt is a pleasant 73 y/o female largely bed bound with PMHx of CVA about 9 years ago on Aspirin/statin, chronic hyponatremia on salt tablets, Diabetes w/diabetic neuropathy,  Anemia of chronic disease,  Multifocal Pna 2 /2021, Gall bladder CA, and abd pain who was previously on home hospice and now presents to [place]  ED c/o worsening weakness  and increasing abd pain.  Pt's daughter reported pt has been having foul smelling urine and a dry cough.   Pt also had poor PO intake, and no bowel movements for 5 days.  Her daughter reports she has been taking Bactrim for 5 days without improvement.  Pt previously responded well to Milk of Magnesia,  however lately it has not helped her move her bowels.   Sick contact : pt's son-in-law has recently had a cough.     Pt denies chest pain, no SOB, no v/d,  no calf pain.  Pt reports some dysuria.  No specific new weakness of the extremities.      (14 Nov 2022 17:12)      11/15: laying in bed, c/o feeling weak and having some abd pain. otherwise no issues.       REVIEW OF SYSTEMS:    CONSTITUTIONAL: + weakness, no fevers or chills  EYES/ENT: No visual changes;  No vertigo or throat pain   NECK: No pain or stiffness  RESPIRATORY: No cough, wheezing, hemoptysis; No shortness of breath  CARDIOVASCULAR: No chest pain or palpitations  GASTROINTESTINAL: No abdominal or epigastric pain. No nausea, vomiting, or hematemesis; No diarrhea + constipation. No melena or hematochezia.  GENITOURINARY: No dysuria, frequency or hematuria  NEUROLOGICAL: No numbness or weakness  SKIN: No itching, burning, rashes, or lesions   All other review of systems is negative unless indicated above      Weight (kg): 50 (11-14 @ 20:21)    Vital Signs Last 24 Hrs  T(C): 37.2 (15 Nov 2022 08:16), Max: 37.2 (14 Nov 2022 13:41)  T(F): 99 (15 Nov 2022 08:16), Max: 99 (14 Nov 2022 13:41)  HR: 72 (15 Nov 2022 08:16) (72 - 88)  BP: 122/47 (15 Nov 2022 08:16) (122/47 - 152/52)  BP(mean): 80 (14 Nov 2022 20:21) (80 - 80)  RR: 18 (15 Nov 2022 08:16) (16 - 18)  SpO2: 95% (15 Nov 2022 08:16) (95% - 99%)    Parameters below as of 15 Nov 2022 08:16  Patient On (Oxygen Delivery Method): room air        PHYSICAL EXAM:    Constitutional: NAD, awake and alert, well-developed  HEENT: PERR, EOMI, Normal Hearing, MMM  Neck: Soft and supple, No LAD, No JVD  Respiratory: Breath sounds are clear bilaterally, No wheezing, rales or rhonchi  Cardiovascular: S1 and S2, regular rate and rhythm, no Murmurs, gallops or rubs  Gastrointestinal: Bowel Sounds present, soft, nontender, nondistended, no guarding, no rebound  Extremities: No peripheral edema  Vascular: 2+ peripheral pulses  Neurological: A/O x 3, no focal deficits  Musculoskeletal: 5/5 strength b/l upper and lower extremities  Skin: No rashes    MEDICATIONS:  MEDICATIONS  (STANDING):  artificial  tears Solution 1 Drop(s) Both EYES four times a day  aspirin enteric coated 81 milliGRAM(s) Oral daily  azithromycin  IVPB      azithromycin  IVPB 500 milliGRAM(s) IV Intermittent every 24 hours  calcium carbonate    500 mG (Tums) Chewable 1 Tablet(s) Chew two times a day  cefTRIAXone Injectable. 1000 milliGRAM(s) IV Push every 24 hours  cholecalciferol 2000 Unit(s) Oral daily  cyanocobalamin 2000 MICROGram(s) Oral daily  dextrose 5%. 1000 milliLiter(s) (50 mL/Hr) IV Continuous <Continuous>  dextrose 5%. 1000 milliLiter(s) (100 mL/Hr) IV Continuous <Continuous>  dextrose 50% Injectable 25 Gram(s) IV Push once  dextrose 50% Injectable 12.5 Gram(s) IV Push once  dextrose 50% Injectable 25 Gram(s) IV Push once  enoxaparin Injectable 40 milliGRAM(s) SubCutaneous every 24 hours  ferrous    sulfate 325 milliGRAM(s) Oral daily  folic acid 1 milliGRAM(s) Oral daily  gabapentin 600 milliGRAM(s) Oral at bedtime  gabapentin 400 milliGRAM(s) Oral daily  gabapentin 400 milliGRAM(s) Oral <User Schedule>  glucagon  Injectable 1 milliGRAM(s) IntraMuscular once  hydrALAZINE 25 milliGRAM(s) Oral at bedtime  influenza  Vaccine (HIGH DOSE) 0.7 milliLiter(s) IntraMuscular once  insulin glargine Injectable (LANTUS) 50 Unit(s) SubCutaneous at bedtime  insulin lispro (ADMELOG) corrective regimen sliding scale   SubCutaneous three times a day before meals  insulin lispro (ADMELOG) corrective regimen sliding scale   SubCutaneous at bedtime  multivitamin 1 Tablet(s) Oral daily  oxybutynin 5 milliGRAM(s) Oral daily  pantoprazole    Tablet 40 milliGRAM(s) Oral before breakfast  polyethylene glycol 3350 17 Gram(s) Oral daily  pyridoxine 50 milliGRAM(s) Oral daily  senna 2 Tablet(s) Oral at bedtime  sodium chloride 1 Gram(s) Oral two times a day  tamsulosin 0.4 milliGRAM(s) Oral at bedtime  thiamine 100 milliGRAM(s) Oral daily  torsemide 10 milliGRAM(s) Oral daily  valsartan 320 milliGRAM(s) Oral daily      LABS: All Labs Reviewed:                        9.9    5.49  )-----------( 146      ( 14 Nov 2022 12:15 )             30.7     11-14    129<L>  |  94<L>  |  14  ----------------------------<  182<H>  4.2   |  28  |  1.09    Ca    9.7      14 Nov 2022 12:15    TPro  7.4  /  Alb  3.4  /  TBili  0.4  /  DBili  x   /  AST  19  /  ALT  20  /  AlkPhos  85  11-14    PT/INR - ( 14 Nov 2022 12:15 )   PT: 13.2 sec;   INR: 1.14 ratio   PTT - ( 14 Nov 2022 12:15 )  PTT:32.6 sec      RADIOLOGY/EKG:    < from: CT Abdomen and Pelvis w/ IV Cont (11.14.22 @ 15:13) >    IMPRESSION:    Bilateral pleural effusions.  Small focus ofair in the pleural space of the right posterior   costophrenic angle.  Septal thickening, could reflect interstitial edema.  Partially imaged airspace consolidation right middle left lingula lobe,   may reflect infection/pneumonia.  Right paracardiac/epiphrenic lymph nodes, stable.    Recommend further evaluation with CT scan of the chest.    Possible cirrhotic morphology.  Poorly defined low-attenuation posterior right hepatic lobe.  Recommend further evaluation with MRI if there are no clinical  contraindications, to exclude discrete hepatic mass.    Mild splenomegaly.    Generalized colonic fecal retention.    Patchy decreased enhancement midpole right kidney may be associated with   infection/pyelonephritis.    Heterogeneous appearance of the uterus which is deviated to the right.  Recommend further evaluation with pelvic ultrasonography.    Other findings as discussed above.      --- End of Report ---    HERBER MOORE MD; Attending Radiologist  This document hasbeen electronically signed. Nov 14 2022  4:45PM    < end of copied text >           Patient is a 72y old  Female who presents with a chief complaint of Weakness  UTI, Pyelonephritis  Pna  Constipation (15 Nov 2022 09:43)      SUBJECTIVE:   HPI:  Pt is a pleasant 71 y/o female largely bed bound with PMHx of CVA about 9 years ago on Aspirin/statin, chronic hyponatremia on salt tablets, Diabetes w/diabetic neuropathy,  Anemia of chronic disease,  Multifocal Pna 2 /2021, Gall bladder CA, and abd pain who was previously on home hospice and now presents to [place]  ED c/o worsening weakness  and increasing abd pain.  Pt's daughter reported pt has been having foul smelling urine and a dry cough.   Pt also had poor PO intake, and no bowel movements for 5 days.  Her daughter reports she has been taking Bactrim for 5 days without improvement.  Pt previously responded well to Milk of Magnesia,  however lately it has not helped her move her bowels.   Sick contact : pt's son-in-law has recently had a cough.     Pt denies chest pain, no SOB, no v/d,  no calf pain.  Pt reports some dysuria.  No specific new weakness of the extremities.      (14 Nov 2022 17:12)      11/15: laying in bed, c/o feeling weak and having some abd pain. otherwise no issues. daughter at bedside and updated on management/ plan of care       REVIEW OF SYSTEMS:    CONSTITUTIONAL: + weakness, no fevers or chills  EYES/ENT: No visual changes;  No vertigo or throat pain   NECK: No pain or stiffness  RESPIRATORY: No cough, wheezing, hemoptysis; No shortness of breath  CARDIOVASCULAR: No chest pain or palpitations  GASTROINTESTINAL: No abdominal or epigastric pain. No nausea, vomiting, or hematemesis; No diarrhea + constipation. No melena or hematochezia.  GENITOURINARY: No dysuria, frequency or hematuria  NEUROLOGICAL: No numbness or weakness  SKIN: No itching, burning, rashes, or lesions   All other review of systems is negative unless indicated above      Weight (kg): 50 (11-14 @ 20:21)    Vital Signs Last 24 Hrs  T(C): 37.2 (15 Nov 2022 08:16), Max: 37.2 (14 Nov 2022 13:41)  T(F): 99 (15 Nov 2022 08:16), Max: 99 (14 Nov 2022 13:41)  HR: 72 (15 Nov 2022 08:16) (72 - 88)  BP: 122/47 (15 Nov 2022 08:16) (122/47 - 152/52)  BP(mean): 80 (14 Nov 2022 20:21) (80 - 80)  RR: 18 (15 Nov 2022 08:16) (16 - 18)  SpO2: 95% (15 Nov 2022 08:16) (95% - 99%)    Parameters below as of 15 Nov 2022 08:16  Patient On (Oxygen Delivery Method): room air        PHYSICAL EXAM:    Constitutional: NAD, awake and alert, well-developed  HEENT: PERR, EOMI, Normal Hearing, MMM  Neck: Soft and supple, No LAD, No JVD  Respiratory: Breath sounds are clear bilaterally, No wheezing, rales or rhonchi  Cardiovascular: S1 and S2, regular rate and rhythm, no Murmurs, gallops or rubs  Gastrointestinal: Bowel Sounds present, soft, nontender, nondistended, no guarding, no rebound  : bruce cath in place   Extremities: No peripheral edema  Vascular: 2+ peripheral pulses  Neurological: A/O x 3, no focal deficits  Musculoskeletal: 5/5 strength b/l upper and lower extremities  Skin: No rashes    MEDICATIONS:  MEDICATIONS  (STANDING):  artificial  tears Solution 1 Drop(s) Both EYES four times a day  aspirin enteric coated 81 milliGRAM(s) Oral daily  azithromycin  IVPB      azithromycin  IVPB 500 milliGRAM(s) IV Intermittent every 24 hours  calcium carbonate    500 mG (Tums) Chewable 1 Tablet(s) Chew two times a day  cefTRIAXone Injectable. 1000 milliGRAM(s) IV Push every 24 hours  cholecalciferol 2000 Unit(s) Oral daily  cyanocobalamin 2000 MICROGram(s) Oral daily  dextrose 5%. 1000 milliLiter(s) (50 mL/Hr) IV Continuous <Continuous>  dextrose 5%. 1000 milliLiter(s) (100 mL/Hr) IV Continuous <Continuous>  dextrose 50% Injectable 25 Gram(s) IV Push once  dextrose 50% Injectable 12.5 Gram(s) IV Push once  dextrose 50% Injectable 25 Gram(s) IV Push once  enoxaparin Injectable 40 milliGRAM(s) SubCutaneous every 24 hours  ferrous    sulfate 325 milliGRAM(s) Oral daily  folic acid 1 milliGRAM(s) Oral daily  gabapentin 600 milliGRAM(s) Oral at bedtime  gabapentin 400 milliGRAM(s) Oral daily  gabapentin 400 milliGRAM(s) Oral <User Schedule>  glucagon  Injectable 1 milliGRAM(s) IntraMuscular once  hydrALAZINE 25 milliGRAM(s) Oral at bedtime  influenza  Vaccine (HIGH DOSE) 0.7 milliLiter(s) IntraMuscular once  insulin glargine Injectable (LANTUS) 50 Unit(s) SubCutaneous at bedtime  insulin lispro (ADMELOG) corrective regimen sliding scale   SubCutaneous three times a day before meals  insulin lispro (ADMELOG) corrective regimen sliding scale   SubCutaneous at bedtime  multivitamin 1 Tablet(s) Oral daily  oxybutynin 5 milliGRAM(s) Oral daily  pantoprazole    Tablet 40 milliGRAM(s) Oral before breakfast  polyethylene glycol 3350 17 Gram(s) Oral daily  pyridoxine 50 milliGRAM(s) Oral daily  senna 2 Tablet(s) Oral at bedtime  sodium chloride 1 Gram(s) Oral two times a day  tamsulosin 0.4 milliGRAM(s) Oral at bedtime  thiamine 100 milliGRAM(s) Oral daily  torsemide 10 milliGRAM(s) Oral daily  valsartan 320 milliGRAM(s) Oral daily      LABS: All Labs Reviewed:                        9.9    5.49  )-----------( 146      ( 14 Nov 2022 12:15 )             30.7     11-14    129<L>  |  94<L>  |  14  ----------------------------<  182<H>  4.2   |  28  |  1.09    Ca    9.7      14 Nov 2022 12:15    TPro  7.4  /  Alb  3.4  /  TBili  0.4  /  DBili  x   /  AST  19  /  ALT  20  /  AlkPhos  85  11-14    PT/INR - ( 14 Nov 2022 12:15 )   PT: 13.2 sec;   INR: 1.14 ratio   PTT - ( 14 Nov 2022 12:15 )  PTT:32.6 sec      RADIOLOGY/EKG:    < from: CT Abdomen and Pelvis w/ IV Cont (11.14.22 @ 15:13) >    IMPRESSION:    Bilateral pleural effusions.  Small focus ofair in the pleural space of the right posterior   costophrenic angle.  Septal thickening, could reflect interstitial edema.  Partially imaged airspace consolidation right middle left lingula lobe,   may reflect infection/pneumonia.  Right paracardiac/epiphrenic lymph nodes, stable.    Recommend further evaluation with CT scan of the chest.    Possible cirrhotic morphology.  Poorly defined low-attenuation posterior right hepatic lobe.  Recommend further evaluation with MRI if there are no clinical  contraindications, to exclude discrete hepatic mass.    Mild splenomegaly.    Generalized colonic fecal retention.    Patchy decreased enhancement midpole right kidney may be associated with   infection/pyelonephritis.    Heterogeneous appearance of the uterus which is deviated to the right.  Recommend further evaluation with pelvic ultrasonography.    Other findings as discussed above.      --- End of Report ---    HERBER MOORE MD; Attending Radiologist  This document hasbeen electronically signed. Nov 14 2022  4:45PM    < end of copied text >

## 2022-11-15 NOTE — CONSULT NOTE ADULT - CONVERSATION DETAILS
Return home with hospice through [facility]                     Palliative team spoke with daughter via telephone to discuss GOC, assist with planning and provide supportive counseling.  Palliative role explained.  Emotional support provided.  Pt. deferred to daughter for decisions.  Pt. resides at home with daughter, daughter state she is very weak and has been declining.  Daughter states Pt is bedbound, receives assistance with all ADLs.  Daughters feelings explored.  Support provided.    Daughter shared Pts journey thus far with her illness.  We discussed Pts current medical condition including dx and prognosis.  Daughter shared that Pt has been getting UTIs about every 2 months recently and has ended up at [facility] each time for IV medications.  Daughter shared with us that Pt. is at home with the support of hospice through [facility].  Daughter aware of the philosophy of hospice and desires to focus on her moms comfort.  She explained that oral medications were not working at home for Pts current UTI therefore hospice stated to go to [facility] as Pt was complaining of discomfort.      Advance directives reviewed.  Pt. deferred to daughter to assist with decisions.  MOLST on file to reflect DNR/DNI.      Plan to return home with hospice through [facility].  Emotional support provided.  Our team to continue to follow.

## 2022-11-15 NOTE — CONSULT NOTE ADULT - SUBJECTIVE AND OBJECTIVE BOX
HPI:     Pt is a 72y old Female with hx of       PAIN: ( )Yes   ( )No  Level:  Location:  Intensity:    /10  Quality:  Aggravating Factors:  Alleviating Factors:  Radiation:  Duration/Timing:  Impact on ADLs:    DYSPNEA: ( ) Yes  ( ) No  Level:    PAST MEDICAL & SURGICAL HISTORY:  CVA (cerebral vascular accident)      Diabetes mellitus      History of cholecystectomy          SOCIAL HX:    Hx opiate tolerance ( )YES  ( )NO    Baseline ADLs  (Prior to Admission)  ( ) Independent   ( )Dependent    FAMILY HISTORY:  FH: diabetes mellitus        Review of Systems:    Anxiety-  Depression-  Physical Discomfort-  Dyspnea-  Constipation-  Diarrhea-  Nausea-  Vomiting-  Anorexia-  Weight Loss-   Cough-  Secretions-  Fatigue-  Weakness-  Delirium-    All other systems reviewed and negative  Unable to obtain/Limited due to:      PHYSICAL EXAM:    Vital Signs Last 24 Hrs  T(C): 37.2 (15 Nov 2022 08:16), Max: 37.2 (14 Nov 2022 13:41)  T(F): 99 (15 Nov 2022 08:16), Max: 99 (14 Nov 2022 13:41)  HR: 72 (15 Nov 2022 08:16) (72 - 88)  BP: 122/47 (15 Nov 2022 08:16) (122/47 - 152/52)  BP(mean): 80 (14 Nov 2022 20:21) (80 - 80)  RR: 18 (15 Nov 2022 08:16) (16 - 18)  SpO2: 95% (15 Nov 2022 08:16) (95% - 99%)    Parameters below as of 15 Nov 2022 08:16  Patient On (Oxygen Delivery Method): room air      Daily     Daily     PPSV2:   %  FAST:    General:  Mental Status:  HEENT:  Lungs:  Cardiac:  GI:  :  Ext:  Neuro:      LABS:                        9.9    5.49  )-----------( 146      ( 14 Nov 2022 12:15 )             30.7     11-14    129<L>  |  94<L>  |  14  ----------------------------<  182<H>  4.2   |  28  |  1.09    Ca    9.7      14 Nov 2022 12:15    TPro  7.4  /  Alb  3.4  /  TBili  0.4  /  DBili  x   /  AST  19  /  ALT  20  /  AlkPhos  85  11-14    PT/INR - ( 14 Nov 2022 12:15 )   PT: 13.2 sec;   INR: 1.14 ratio         PTT - ( 14 Nov 2022 12:15 )  PTT:32.6 sec  Albumin: Albumin, Serum: 3.4 g/dL (11-14 @ 12:15)      Allergies    No Known Allergies    Intolerances      MEDICATIONS  (STANDING):  artificial  tears Solution 1 Drop(s) Both EYES four times a day  aspirin enteric coated 81 milliGRAM(s) Oral daily  azithromycin  IVPB      azithromycin  IVPB 500 milliGRAM(s) IV Intermittent every 24 hours  calcium carbonate    500 mG (Tums) Chewable 1 Tablet(s) Chew two times a day  cefTRIAXone Injectable. 1000 milliGRAM(s) IV Push every 24 hours  cholecalciferol 2000 Unit(s) Oral daily  cyanocobalamin 2000 MICROGram(s) Oral daily  dextrose 5%. 1000 milliLiter(s) (50 mL/Hr) IV Continuous <Continuous>  dextrose 5%. 1000 milliLiter(s) (100 mL/Hr) IV Continuous <Continuous>  dextrose 50% Injectable 25 Gram(s) IV Push once  dextrose 50% Injectable 12.5 Gram(s) IV Push once  dextrose 50% Injectable 25 Gram(s) IV Push once  enoxaparin Injectable 40 milliGRAM(s) SubCutaneous every 24 hours  ferrous    sulfate 325 milliGRAM(s) Oral daily  folic acid 1 milliGRAM(s) Oral daily  gabapentin 600 milliGRAM(s) Oral at bedtime  gabapentin 400 milliGRAM(s) Oral daily  gabapentin 400 milliGRAM(s) Oral <User Schedule>  glucagon  Injectable 1 milliGRAM(s) IntraMuscular once  hydrALAZINE 25 milliGRAM(s) Oral at bedtime  influenza  Vaccine (HIGH DOSE) 0.7 milliLiter(s) IntraMuscular once  insulin glargine Injectable (LANTUS) 50 Unit(s) SubCutaneous at bedtime  insulin lispro (ADMELOG) corrective regimen sliding scale   SubCutaneous three times a day before meals  insulin lispro (ADMELOG) corrective regimen sliding scale   SubCutaneous at bedtime  multivitamin 1 Tablet(s) Oral daily  oxybutynin 5 milliGRAM(s) Oral daily  pantoprazole    Tablet 40 milliGRAM(s) Oral before breakfast  polyethylene glycol 3350 17 Gram(s) Oral daily  pyridoxine 50 milliGRAM(s) Oral daily  senna 2 Tablet(s) Oral at bedtime  sodium chloride 1 Gram(s) Oral two times a day  tamsulosin 0.4 milliGRAM(s) Oral at bedtime  thiamine 100 milliGRAM(s) Oral daily  torsemide 10 milliGRAM(s) Oral daily  valsartan 320 milliGRAM(s) Oral daily    MEDICATIONS  (PRN):  dextrose Oral Gel 15 Gram(s) Oral once PRN Blood Glucose LESS THAN 70 milliGRAM(s)/deciliter  guaifenesin/dextromethorphan Oral Liquid 10 milliLiter(s) Oral every 4 hours PRN Cough  morphine   Solution 5 milliGRAM(s) Oral every 6 hours PRN Moderate Pain (4 - 6)  sodium chloride 0.65% Nasal 1 Spray(s) Both Nostrils every 2 hours PRN Nasal Congestion      RADIOLOGY/ADDITIONAL STUDIES:

## 2022-11-15 NOTE — DIETITIAN INITIAL EVALUATION ADULT - PERTINENT LABORATORY DATA
11-14    129<L>  |  94<L>  |  14  ----------------------------<  182<H>  4.2   |  28  |  1.09    Ca    9.7      14 Nov 2022 12:15    TPro  7.4  /  Alb  3.4  /  TBili  0.4  /  DBili  x   /  AST  19  /  ALT  20  /  AlkPhos  85  11-14  POCT Blood Glucose.: 100 mg/dL (11-15-22 @ 08:05)

## 2022-11-15 NOTE — CONSULT NOTE ADULT - SUBJECTIVE AND OBJECTIVE BOX
HPI:    "71 y/o female largely bed bound with PMHx of CVA about 9 years ago on Aspirin/statin, chronic hyponatremia on salt tablets, Diabetes w/diabetic neuropathy,  Anemia of chronic disease,  Multifocal Pna 2 /2021, Gall bladder CA, and abd pain who was previously on home hospice and now presents to [facility]  [facility] c/o worsening weakness  and increasing abd pain.  Pt's daughter reported pt has been having foul smelling urine and a dry cough.   Pt also had poor PO intake, and no bowel movements for 5 days.  Her daughter reports she has been taking Bactrim for 5 days without improvement.  Pt previously responded well to Milk of Magnesia,  however lately it has not helped her move her bowels.   Sick contact : pt's son-in-law has recently had a cough. ""    11/15    Patient was seen and examined.  Denies nausea, vomiting, shortness of breath, chest pain, headaches, abdominal pain, constipation .   Shes very fatigued reffers to decisions to her daughter speaks english but prefers Zoroastrian, Bangali or Urdi.       PAIN: ( )Yes   (x )No  Level:   DYSPNEA: ( ) Yes  (x ) No  Level:    PAST MEDICAL & SURGICAL HISTORY:  CVA (cerebral vascular accident)      Diabetes mellitus      History of cholecystectomy          SOCIAL HX:    Hx opiate tolerance ( )YES  ( x)NO    Baseline ADLs  (Prior to Admission)  ( ) Independent   (xx )Dependent    FAMILY HISTORY:  FH: diabetes mellitus      Review of Systems:    Anxiety- denies  Depression-denies  Physical Discomfort- in NAD  Dyspnea-denies  Constipation-denies  Diarrhea-denies  Nausea-denies  Vomiting-denies  Anorexia- +  Weight Loss- ++  Cough-denies  Secretions-denies  Fatigue-++  Weakness-++  Delirium-denies    All other systems reviewed and negative       PHYSICAL EXAM:    Vital Signs Last 24 Hrs  T(C): 37.2 (15 Nov 2022 08:16), Max: 37.2 (14 Nov 2022 19:31)  T(F): 99 (15 Nov 2022 08:16), Max: 99 (14 Nov 2022 20:21)  HR: 72 (15 Nov 2022 08:16) (72 - 88)  BP: 122/47 (15 Nov 2022 08:16) (122/47 - 152/52)  BP(mean): 80 (14 Nov 2022 20:21) (80 - 80)  RR: 18 (15 Nov 2022 08:16) (16 - 18)  SpO2: 95% (15 Nov 2022 08:16) (95% - 99%)    Parameters below as of 15 Nov 2022 08:16  Patient On (Oxygen Delivery Method): room air      Daily     Daily     PPSV2: 30  %  FAST:    General: calm in NAD  Mental Status: awake alert oriented    HEENT: eomi, perrl  Lungs: ctabl b/l bs  Cardiac: s1s2 no mgr  GI: soft nontender +BS  : voids  Ext: moves all 4 extremities spontaneously  Neuro: bedbound follows commands       LABS:                        9.9    5.49  )-----------( 146      ( 14 Nov 2022 12:15 )             30.7     11-14    129<L>  |  94<L>  |  14  ----------------------------<  182<H>  4.2   |  28  |  1.09    Ca    9.7      14 Nov 2022 12:15    TPro  7.4  /  Alb  3.4  /  TBili  0.4  /  DBili  x   /  AST  19  /  ALT  20  /  AlkPhos  85  11-14    PT/INR - ( 14 Nov 2022 12:15 )   PT: 13.2 sec;   INR: 1.14 ratio         PTT - ( 14 Nov 2022 12:15 )  PTT:32.6 sec  Albumin: Albumin, Serum: 3.4 g/dL (11-14 @ 12:15)      Allergies    No Known Allergies    Intolerances      MEDICATIONS  (STANDING):  artificial  tears Solution 1 Drop(s) Both EYES four times a day  aspirin enteric coated 81 milliGRAM(s) Oral daily  azithromycin  IVPB      azithromycin  IVPB 500 milliGRAM(s) IV Intermittent every 24 hours  calcium carbonate    500 mG (Tums) Chewable 1 Tablet(s) Chew two times a day  cefTRIAXone Injectable. 1000 milliGRAM(s) IV Push every 24 hours  cholecalciferol 2000 Unit(s) Oral daily  cyanocobalamin 2000 MICROGram(s) Oral daily  dextrose 5%. 1000 milliLiter(s) (50 mL/Hr) IV Continuous <Continuous>  dextrose 5%. 1000 milliLiter(s) (100 mL/Hr) IV Continuous <Continuous>  dextrose 50% Injectable 25 Gram(s) IV Push once  dextrose 50% Injectable 12.5 Gram(s) IV Push once  dextrose 50% Injectable 25 Gram(s) IV Push once  enoxaparin Injectable 40 milliGRAM(s) SubCutaneous every 24 hours  ferrous    sulfate 325 milliGRAM(s) Oral daily  folic acid 1 milliGRAM(s) Oral daily  gabapentin 600 milliGRAM(s) Oral at bedtime  gabapentin 400 milliGRAM(s) Oral daily  gabapentin 400 milliGRAM(s) Oral <User Schedule>  glucagon  Injectable 1 milliGRAM(s) IntraMuscular once  hydrALAZINE 25 milliGRAM(s) Oral at bedtime  influenza  Vaccine (HIGH DOSE) 0.7 milliLiter(s) IntraMuscular once  insulin glargine Injectable (LANTUS) 50 Unit(s) SubCutaneous at bedtime  insulin lispro (ADMELOG) corrective regimen sliding scale   SubCutaneous three times a day before meals  insulin lispro (ADMELOG) corrective regimen sliding scale   SubCutaneous at bedtime  multivitamin 1 Tablet(s) Oral daily  oxybutynin 5 milliGRAM(s) Oral daily  pantoprazole    Tablet 40 milliGRAM(s) Oral before breakfast  polyethylene glycol 3350 17 Gram(s) Oral daily  pyridoxine 50 milliGRAM(s) Oral daily  senna 2 Tablet(s) Oral at bedtime  sodium chloride 1 Gram(s) Oral two times a day  tamsulosin 0.4 milliGRAM(s) Oral at bedtime  thiamine 100 milliGRAM(s) Oral daily  torsemide 10 milliGRAM(s) Oral daily  valsartan 320 milliGRAM(s) Oral daily    MEDICATIONS  (PRN):  dextrose Oral Gel 15 Gram(s) Oral once PRN Blood Glucose LESS THAN 70 milliGRAM(s)/deciliter  guaifenesin/dextromethorphan Oral Liquid 10 milliLiter(s) Oral every 4 hours PRN Cough  morphine   Solution 5 milliGRAM(s) Oral every 6 hours PRN Moderate Pain (4 - 6)  sodium chloride 0.65% Nasal 1 Spray(s) Both Nostrils every 2 hours PRN Nasal Congestion      RADIOLOGY/ADDITIONAL STUDIES:

## 2022-11-15 NOTE — DIETITIAN INITIAL EVALUATION ADULT - OTHER INFO
t is a pleasant 73 y/o female largely bed bound with PMHx of CVA about 9 years ago on Aspirin/statin, chronic hyponatremia on salt tablets, Diabetes w/diabetic neuropathy,  Anemia of chronic disease,  Multifocal Pna 2 /2021, Gall bladder CA, and abd pain who was previously on home hospice and now presents to [facility]  [facility] c/o worsening weakness  and increasing abd pain.  Pt's daughter reported pt has been having foul smelling urine and a dry cough.  Pt also had poor PO intake, and no bowel movements for 5 days.  Her daughter reports she has been taking Bactrim for 5 days without improvement.  Pt previously responded well to Milk of Magnesia,  however lately it has not helped her move her bowels  Admit for weakness, UTI, Pyelonephritis, community Acq Pna , R ML and Infr L lingula, gallbladder cancer - not a candidate for chemo as per daughter, cirrhosis     Unable to obtain diet / wt hx 2/2 hard to understand pt. Swallow eval ordered; palliative pending at this time - is DNR/DNI.  Bed scale wt of  109.5# taken by RD on 11/15/22. Unable to identify UBW & unable to confirm any wt changes. On miralax to help w/ constipation. Continue w/ regular diet as tolerated; will add ensure + HP shake TID - pt is receptive. See below for other recommendations.       t is a pleasant 71 y/o female largely bed bound with PMHx of CVA about 9 years ago on Aspirin/statin, chronic hyponatremia on salt tablets, Diabetes w/diabetic neuropathy,  Anemia of chronic disease,  Multifocal Pna 2 /2021, Gall bladder CA, and abd pain who was previously on home hospice and now presents to [facility]  [facility] c/o worsening weakness  and increasing abd pain.  Pt's daughter reported pt has been having foul smelling urine and a dry cough.  Pt also had poor PO intake, and no bowel movements for 5 days.  Her daughter reports she has been taking Bactrim for 5 days without improvement.  Pt previously responded well to Milk of Magnesia,  however lately it has not helped her move her bowels  Admit for weakness, UTI, Pyelonephritis, community Acq Pna , R ML and Infr L lingula, gallbladder cancer - not a candidate for chemo as per daughter, cirrhosis     Unable to obtain diet / wt hx 2/2 hard to understand pt. Swallow eval ordered; palliative pending at this time - is DNR/DNI.  Bed scale wt of  109.5# taken by RD on 11/15/22. Unable to identify UBW & unable to confirm any wt changes. On miralax to help w/ constipation. NFPE reveals moderate-severe muscle/ fat wasting - edema could be masking losses. Continue w/ regular diet as tolerated; will add ensure + HP shake TID - pt is receptive. See below for other recommendations.

## 2022-11-15 NOTE — SWALLOW BEDSIDE ASSESSMENT ADULT - SLP GENERAL OBSERVATIONS
The pt was alert. Her affect was flat. Pt was interactive but communicatively passive. Pt was able to verbalize during communicative probes in [facility]. Daughter served as . When pt did verbalize, her motor speech abilities were felt to be functional and her verbalizations were linguistically intact. Pt was able to verbalize needs in [facility] and daughter felt that pt is at usual speech-language state

## 2022-11-16 LAB
ALBUMIN SERPL ELPH-MCNC: 3.2 G/DL — LOW (ref 3.3–5)
ALP SERPL-CCNC: 82 U/L — SIGNIFICANT CHANGE UP (ref 40–120)
ALT FLD-CCNC: 18 U/L — SIGNIFICANT CHANGE UP (ref 12–78)
ANION GAP SERPL CALC-SCNC: 6 MMOL/L — SIGNIFICANT CHANGE UP (ref 5–17)
AST SERPL-CCNC: 22 U/L — SIGNIFICANT CHANGE UP (ref 15–37)
BILIRUB SERPL-MCNC: 0.4 MG/DL — SIGNIFICANT CHANGE UP (ref 0.2–1.2)
BUN SERPL-MCNC: 12 MG/DL — SIGNIFICANT CHANGE UP (ref 7–23)
CALCIUM SERPL-MCNC: 9.8 MG/DL — SIGNIFICANT CHANGE UP (ref 8.5–10.1)
CHLORIDE SERPL-SCNC: 97 MMOL/L — SIGNIFICANT CHANGE UP (ref 96–108)
CO2 SERPL-SCNC: 27 MMOL/L — SIGNIFICANT CHANGE UP (ref 22–31)
CREAT SERPL-MCNC: 0.94 MG/DL — SIGNIFICANT CHANGE UP (ref 0.5–1.3)
EGFR: 65 ML/MIN/1.73M2 — SIGNIFICANT CHANGE UP
GLUCOSE SERPL-MCNC: 157 MG/DL — HIGH (ref 70–99)
HCT VFR BLD CALC: 31.4 % — LOW (ref 34.5–45)
HGB BLD-MCNC: 10.3 G/DL — LOW (ref 11.5–15.5)
MCHC RBC-ENTMCNC: 27.7 PG — SIGNIFICANT CHANGE UP (ref 27–34)
MCHC RBC-ENTMCNC: 32.8 GM/DL — SIGNIFICANT CHANGE UP (ref 32–36)
MCV RBC AUTO: 84.4 FL — SIGNIFICANT CHANGE UP (ref 80–100)
PLATELET # BLD AUTO: 155 K/UL — SIGNIFICANT CHANGE UP (ref 150–400)
POTASSIUM SERPL-MCNC: 4.2 MMOL/L — SIGNIFICANT CHANGE UP (ref 3.5–5.3)
POTASSIUM SERPL-SCNC: 4.2 MMOL/L — SIGNIFICANT CHANGE UP (ref 3.5–5.3)
PROT SERPL-MCNC: 7.3 GM/DL — SIGNIFICANT CHANGE UP (ref 6–8.3)
RBC # BLD: 3.72 M/UL — LOW (ref 3.8–5.2)
RBC # FLD: 14.4 % — SIGNIFICANT CHANGE UP (ref 10.3–14.5)
SODIUM SERPL-SCNC: 130 MMOL/L — LOW (ref 135–145)
WBC # BLD: 5.25 K/UL — SIGNIFICANT CHANGE UP (ref 3.8–10.5)
WBC # FLD AUTO: 5.25 K/UL — SIGNIFICANT CHANGE UP (ref 3.8–10.5)

## 2022-11-16 PROCEDURE — 99232 SBSQ HOSP IP/OBS MODERATE 35: CPT

## 2022-11-16 RX ADMIN — ENOXAPARIN SODIUM 40 MILLIGRAM(S): 100 INJECTION SUBCUTANEOUS at 21:32

## 2022-11-16 RX ADMIN — Medication 325 MILLIGRAM(S): at 09:01

## 2022-11-16 RX ADMIN — Medication 1 MILLIGRAM(S): at 09:04

## 2022-11-16 RX ADMIN — Medication 1 TABLET(S): at 09:03

## 2022-11-16 RX ADMIN — INSULIN GLARGINE 50 UNIT(S): 100 INJECTION, SOLUTION SUBCUTANEOUS at 21:32

## 2022-11-16 RX ADMIN — Medication 1 DROP(S): at 06:01

## 2022-11-16 RX ADMIN — Medication 1 TABLET(S): at 21:33

## 2022-11-16 RX ADMIN — SODIUM CHLORIDE 1 GRAM(S): 9 INJECTION INTRAMUSCULAR; INTRAVENOUS; SUBCUTANEOUS at 21:33

## 2022-11-16 RX ADMIN — SODIUM CHLORIDE 1 GRAM(S): 9 INJECTION INTRAMUSCULAR; INTRAVENOUS; SUBCUTANEOUS at 09:03

## 2022-11-16 RX ADMIN — Medication 5 MILLIGRAM(S): at 09:01

## 2022-11-16 RX ADMIN — PREGABALIN 2000 MICROGRAM(S): 225 CAPSULE ORAL at 09:02

## 2022-11-16 RX ADMIN — PANTOPRAZOLE SODIUM 40 MILLIGRAM(S): 20 TABLET, DELAYED RELEASE ORAL at 06:01

## 2022-11-16 RX ADMIN — Medication 1: at 09:05

## 2022-11-16 RX ADMIN — SENNA PLUS 2 TABLET(S): 8.6 TABLET ORAL at 21:33

## 2022-11-16 RX ADMIN — Medication 1: at 16:23

## 2022-11-16 RX ADMIN — CEFTRIAXONE 1000 MILLIGRAM(S): 500 INJECTION, POWDER, FOR SOLUTION INTRAMUSCULAR; INTRAVENOUS at 09:08

## 2022-11-16 RX ADMIN — POLYETHYLENE GLYCOL 3350 17 GRAM(S): 17 POWDER, FOR SOLUTION ORAL at 09:00

## 2022-11-16 RX ADMIN — VALSARTAN 320 MILLIGRAM(S): 80 TABLET ORAL at 09:02

## 2022-11-16 RX ADMIN — Medication 100 MILLIGRAM(S): at 16:23

## 2022-11-16 RX ADMIN — AZITHROMYCIN 255 MILLIGRAM(S): 500 TABLET, FILM COATED ORAL at 21:33

## 2022-11-16 RX ADMIN — Medication 10 MILLIGRAM(S): at 09:04

## 2022-11-16 RX ADMIN — GABAPENTIN 400 MILLIGRAM(S): 400 CAPSULE ORAL at 06:01

## 2022-11-16 RX ADMIN — Medication 2000 UNIT(S): at 09:03

## 2022-11-16 RX ADMIN — GABAPENTIN 400 MILLIGRAM(S): 400 CAPSULE ORAL at 12:07

## 2022-11-16 RX ADMIN — Medication 10 MILLILITER(S): at 09:04

## 2022-11-16 RX ADMIN — Medication 25 MILLIGRAM(S): at 21:33

## 2022-11-16 RX ADMIN — Medication 1 DROP(S): at 12:08

## 2022-11-16 RX ADMIN — Medication 50 MILLIGRAM(S): at 09:02

## 2022-11-16 RX ADMIN — TAMSULOSIN HYDROCHLORIDE 0.4 MILLIGRAM(S): 0.4 CAPSULE ORAL at 21:33

## 2022-11-16 RX ADMIN — GABAPENTIN 600 MILLIGRAM(S): 400 CAPSULE ORAL at 21:33

## 2022-11-16 RX ADMIN — Medication 1 DROP(S): at 16:26

## 2022-11-16 RX ADMIN — Medication 1 TABLET(S): at 09:02

## 2022-11-16 RX ADMIN — Medication 81 MILLIGRAM(S): at 09:01

## 2022-11-16 NOTE — PHYSICAL THERAPY INITIAL EVALUATION ADULT - PERTINENT HX OF CURRENT PROBLEM, REHAB EVAL
71 y/o female largely bed bound with PMHx of CVA about 9 years ago on Aspirin/statin, chronic hyponatremia on salt tablets, Diabetes w/diabetic neuropathy,  Anemia of chronic disease,  Multifocal Pna 2 /2021, Gall bladder CA, and abd pain who was previously on home hospice and now presents to [place]  [facility] c/o worsening weakness  and increasing abd pain.  Pt's daughter reported pt has been having foul smelling urine and a dry cough.   Pt also had poor PO intake, and no bowel movements for 5 days.  Her daughter reports she has been taking Bactrim for 5 days without improvement.  Pt previously responded well to Milk of Magnesia,  however lately it has not helped her move her bowels.   Sick contact : pt's son-in-law has recently had a cough.

## 2022-11-16 NOTE — PROGRESS NOTE ADULT - NUTRITIONAL ASSESSMENT
This patient has been assessed with a concern for Malnutrition and has been determined to have a diagnosis/diagnoses of Severe protein-calorie malnutrition.    This patient is being managed with:   Diet Regular-  Entered: Nov 14 2022  4:00PM    
This patient has been assessed with a concern for Malnutrition and has been determined to have a diagnosis/diagnoses of Severe protein-calorie malnutrition.    This patient is being managed with:   Diet Regular-  Entered: Nov 14 2022  4:00PM

## 2022-11-16 NOTE — PHYSICAL THERAPY INITIAL EVALUATION ADULT - ORIENTATION, REHAB EVAL
89 y/o female s/p ileocolic bypass 3/11. NGT replaced overnight. WBC 26 (21). Hypokalemia.     -NPO, NGT   -IVF  -IV Abx  -Pain control PRN   -Monitor/replete electrolytes   -DVT ppx/OOB/IS  -Discussed with Dr. Cleveland  oriented to person, place, time and situation

## 2022-11-16 NOTE — PROGRESS NOTE ADULT - ASSESSMENT
71 y/o female w/ pmhx of CVA, DM 2, recent dx of GB Ca present from home with home hospice for eval of progressive weakness and decreased PO intake as per daughter.       Weakness and failure to thrive multifactorial due to UTI / Pyelonephritis/ parainfluenza and community acquired PNA   - UTI associated with bruce catheter   - s/p 1 L NS in the ED  - cont w/ Rocephin, add Zithromax   - droplet isolation  - elevated lactate , likely due to cirrhosis - not septic   - blood cx negative. urine cx 3+ organisms.   - antitussives prn   - Pulm consult  - swallow eval  - Palliative care consult    Chronic constipation   - CT scan with generalized colonic fecal retention.   - add Miralax and lactulose     Chronic Hyponatremia   - was 129 on prior labs since Feb 2021   - cont w/ salt tabs  - trend in AM labs     Diabetes mellitus type 2   - ISS and lantus  - on regular diet     Hx of gallbladder CA / liver cirrhosis   - acc to daughter no chemo or radiation therapies available.   - on home hospice care   - supportive care  - analgesics and antiemetics PRN       - DVT proph: lovenox  - DNR, MOLST completed with pt's daughter who is the HCP    daughter at bedside and updated on plan of care

## 2022-11-16 NOTE — PHYSICAL THERAPY INITIAL EVALUATION ADULT - SITTING BALANCE: STATIC
Call Reason: to reschedule 11/15/21 appointment  Visit Type: follow up  When appointment should be scheduled:  next available  Provider: Dr. Diaz    Left voicemail in [facility]     [facility]

## 2022-11-16 NOTE — PROGRESS NOTE ADULT - SUBJECTIVE AND OBJECTIVE BOX
Patient is a 72y old  Female who presents with a chief complaint of Weakness  UTI, Pyelonephritis  Pna  Constipation (15 Nov 2022 09:43)      SUBJECTIVE:   HPI:  Pt is a pleasant 71 y/o female largely bed bound with PMHx of CVA about 9 years ago on Aspirin/statin, chronic hyponatremia on salt tablets, Diabetes w/diabetic neuropathy,  Anemia of chronic disease,  Multifocal Pna 2 /2021, Gall bladder CA, and abd pain who was previously on home hospice and now presents to [place]  ED c/o worsening weakness  and increasing abd pain.  Pt's daughter reported pt has been having foul smelling urine and a dry cough.   Pt also had poor PO intake, and no bowel movements for 5 days.  Her daughter reports she has been taking Bactrim for 5 days without improvement.  Pt previously responded well to Milk of Magnesia,  however lately it has not helped her move her bowels.   Sick contact : pt's son-in-law has recently had a cough.     Pt denies chest pain, no SOB, no v/d,  no calf pain.  Pt reports some dysuria.  No specific new weakness of the extremities.      (14 Nov 2022 17:12)      11/15: laying in bed, c/o feeling weak and having some abd pain. otherwise no issues. daughter at bedside and updated on management/ plan of care   11/16     REVIEW OF SYSTEMS:    CONSTITUTIONAL: + weakness, no fevers or chills  EYES/ENT: No visual changes;  No vertigo or throat pain   NECK: No pain or stiffness  RESPIRATORY: No cough, wheezing, hemoptysis; No shortness of breath  CARDIOVASCULAR: No chest pain or palpitations  GASTROINTESTINAL: No abdominal or epigastric pain. No nausea, vomiting, or hematemesis; No diarrhea + constipation. No melena or hematochezia.  GENITOURINARY: No dysuria, frequency or hematuria  NEUROLOGICAL: No numbness or weakness  SKIN: No itching, burning, rashes, or lesions   All other review of systems is negative unless indicated above      Vital Signs Last 24 Hrs  T(C): 37.1 (16 Nov 2022 08:03), Max: 37.2 (15 Nov 2022 21:16)  T(F): 98.7 (16 Nov 2022 08:03), Max: 99 (15 Nov 2022 21:16)  HR: 66 (16 Nov 2022 08:03) (66 - 73)  BP: 118/45 (16 Nov 2022 08:03) (118/45 - 148/50)  BP(mean): 74 (15 Nov 2022 21:16) (74 - 74)  RR: 18 (16 Nov 2022 08:03) (18 - 18)  SpO2: 94% (16 Nov 2022 08:03) (91% - 94%)    Parameters below as of 16 Nov 2022 08:03  Patient On (Oxygen Delivery Method): room air        PHYSICAL EXAM:    Constitutional: NAD, awake and alert, well-developed  HEENT: PERR, EOMI, Normal Hearing, MMM  Neck: Soft and supple, No LAD, No JVD  Respiratory: Breath sounds are clear bilaterally, No wheezing, rales or rhonchi  Cardiovascular: S1 and S2, regular rate and rhythm, no Murmurs, gallops or rubs  Gastrointestinal: Bowel Sounds present, soft, nontender, nondistended, no guarding, no rebound  : bruce cath in place   Extremities: No peripheral edema  Vascular: 2+ peripheral pulses  Neurological: A/O x 3, no focal deficits  Musculoskeletal: 5/5 strength b/l upper and lower extremities  Skin: No rashes    MEDICATIONS:  MEDICATIONS  (STANDING):  artificial  tears Solution 1 Drop(s) Both EYES four times a day  aspirin enteric coated 81 milliGRAM(s) Oral daily  azithromycin  IVPB      azithromycin  IVPB 500 milliGRAM(s) IV Intermittent every 24 hours  calcium carbonate    500 mG (Tums) Chewable 1 Tablet(s) Chew two times a day  cefTRIAXone Injectable. 1000 milliGRAM(s) IV Push every 24 hours  cholecalciferol 2000 Unit(s) Oral daily  cyanocobalamin 2000 MICROGram(s) Oral daily  dextrose 5%. 1000 milliLiter(s) (50 mL/Hr) IV Continuous <Continuous>  dextrose 5%. 1000 milliLiter(s) (100 mL/Hr) IV Continuous <Continuous>  dextrose 50% Injectable 25 Gram(s) IV Push once  dextrose 50% Injectable 12.5 Gram(s) IV Push once  dextrose 50% Injectable 25 Gram(s) IV Push once  enoxaparin Injectable 40 milliGRAM(s) SubCutaneous every 24 hours  ferrous    sulfate 325 milliGRAM(s) Oral daily  folic acid 1 milliGRAM(s) Oral daily  gabapentin 600 milliGRAM(s) Oral at bedtime  gabapentin 400 milliGRAM(s) Oral daily  gabapentin 400 milliGRAM(s) Oral <User Schedule>  glucagon  Injectable 1 milliGRAM(s) IntraMuscular once  hydrALAZINE 25 milliGRAM(s) Oral at bedtime  influenza  Vaccine (HIGH DOSE) 0.7 milliLiter(s) IntraMuscular once  insulin glargine Injectable (LANTUS) 50 Unit(s) SubCutaneous at bedtime  insulin lispro (ADMELOG) corrective regimen sliding scale   SubCutaneous three times a day before meals  insulin lispro (ADMELOG) corrective regimen sliding scale   SubCutaneous at bedtime  multivitamin 1 Tablet(s) Oral daily  oxybutynin 5 milliGRAM(s) Oral daily  pantoprazole    Tablet 40 milliGRAM(s) Oral before breakfast  polyethylene glycol 3350 17 Gram(s) Oral daily  pyridoxine 50 milliGRAM(s) Oral daily  senna 2 Tablet(s) Oral at bedtime  sodium chloride 1 Gram(s) Oral two times a day  tamsulosin 0.4 milliGRAM(s) Oral at bedtime  thiamine 100 milliGRAM(s) Oral daily  torsemide 10 milliGRAM(s) Oral daily  valsartan 320 milliGRAM(s) Oral daily      LABS: All Labs Reviewed:                        9.9    5.49  )-----------( 146      ( 14 Nov 2022 12:15 )             30.7     11-14    129<L>  |  94<L>  |  14  ----------------------------<  182<H>  4.2   |  28  |  1.09    Ca    9.7      14 Nov 2022 12:15    TPro  7.4  /  Alb  3.4  /  TBili  0.4  /  DBili  x   /  AST  19  /  ALT  20  /  AlkPhos  85  11-14    PT/INR - ( 14 Nov 2022 12:15 )   PT: 13.2 sec;   INR: 1.14 ratio   PTT - ( 14 Nov 2022 12:15 )  PTT:32.6 sec      RADIOLOGY/EKG:    < from: CT Abdomen and Pelvis w/ IV Cont (11.14.22 @ 15:13) >    IMPRESSION:    Bilateral pleural effusions.  Small focus ofair in the pleural space of the right posterior   costophrenic angle.  Septal thickening, could reflect interstitial edema.  Partially imaged airspace consolidation right middle left lingula lobe,   may reflect infection/pneumonia.  Right paracardiac/epiphrenic lymph nodes, stable.    Recommend further evaluation with CT scan of the chest.    Possible cirrhotic morphology.  Poorly defined low-attenuation posterior right hepatic lobe.  Recommend further evaluation with MRI if there are no clinical  contraindications, to exclude discrete hepatic mass.    Mild splenomegaly.    Generalized colonic fecal retention.    Patchy decreased enhancement midpole right kidney may be associated with   infection/pyelonephritis.    Heterogeneous appearance of the uterus which is deviated to the right.  Recommend further evaluation with pelvic ultrasonography.    Other findings as discussed above.      --- End of Report ---    HERBER MOORE MD; Attending Radiologist  This document hasbeen electronically signed. Nov 14 2022  4:45PM    < end of copied text >           Patient is a 72y old  Female who presents with a chief complaint of Weakness  UTI, Pyelonephritis  Pna  Constipation (15 Nov 2022 09:43)      SUBJECTIVE:   HPI:  Pt is a pleasant 71 y/o female largely bed bound with PMHx of CVA about 9 years ago on Aspirin/statin, chronic hyponatremia on salt tablets, Diabetes w/diabetic neuropathy,  Anemia of chronic disease,  Multifocal Pna 2 /2021, Gall bladder CA, and abd pain who was previously on home hospice and now presents to [place]  ED c/o worsening weakness  and increasing abd pain.  Pt's daughter reported pt has been having foul smelling urine and a dry cough.   Pt also had poor PO intake, and no bowel movements for 5 days.  Her daughter reports she has been taking Bactrim for 5 days without improvement.  Pt previously responded well to Milk of Magnesia,  however lately it has not helped her move her bowels.   Sick contact : pt's son-in-law has recently had a cough.     Pt denies chest pain, no SOB, no v/d,  no calf pain.  Pt reports some dysuria.  No specific new weakness of the extremities.      (14 Nov 2022 17:12)      11/15: laying in bed, c/o feeling weak and having some abd pain. otherwise no issues. daughter at bedside and updated on management/ plan of care   11/16: sitting up in chair. feeling weak but better today. no other issues.      REVIEW OF SYSTEMS:    CONSTITUTIONAL: + weakness, no fevers or chills  EYES/ENT: No visual changes;  No vertigo or throat pain   NECK: No pain or stiffness  RESPIRATORY: No cough, wheezing, hemoptysis; No shortness of breath  CARDIOVASCULAR: No chest pain or palpitations  GASTROINTESTINAL: No abdominal or epigastric pain. No nausea, vomiting, or hematemesis; No diarrhea + constipation. No melena or hematochezia.  GENITOURINARY: No dysuria, frequency or hematuria  NEUROLOGICAL: No numbness or weakness  SKIN: No itching, burning, rashes, or lesions   All other review of systems is negative unless indicated above      Vital Signs Last 24 Hrs  T(C): 37.1 (16 Nov 2022 08:03), Max: 37.2 (15 Nov 2022 21:16)  T(F): 98.7 (16 Nov 2022 08:03), Max: 99 (15 Nov 2022 21:16)  HR: 66 (16 Nov 2022 08:03) (66 - 73)  BP: 118/45 (16 Nov 2022 08:03) (118/45 - 148/50)  BP(mean): 74 (15 Nov 2022 21:16) (74 - 74)  RR: 18 (16 Nov 2022 08:03) (18 - 18)  SpO2: 94% (16 Nov 2022 08:03) (91% - 94%)    Parameters below as of 16 Nov 2022 08:03  Patient On (Oxygen Delivery Method): room air        PHYSICAL EXAM:    Constitutional: NAD, awake and alert, well-developed  HEENT: PERR, EOMI, Normal Hearing, MMM  Neck: Soft and supple, No LAD, No JVD  Respiratory: Breath sounds are clear bilaterally, No wheezing, rales or rhonchi  Cardiovascular: S1 and S2, regular rate and rhythm, no Murmurs, gallops or rubs  Gastrointestinal: Bowel Sounds present, soft, nontender, nondistended, no guarding, no rebound  : bruce cath in place   Extremities: No peripheral edema  Vascular: 2+ peripheral pulses  Neurological: A/O x 3, no focal deficits  Musculoskeletal: 5/5 strength b/l upper and lower extremities  Skin: No rashes    MEDICATIONS:  MEDICATIONS  (STANDING):  artificial  tears Solution 1 Drop(s) Both EYES four times a day  aspirin enteric coated 81 milliGRAM(s) Oral daily  azithromycin  IVPB      azithromycin  IVPB 500 milliGRAM(s) IV Intermittent every 24 hours  calcium carbonate    500 mG (Tums) Chewable 1 Tablet(s) Chew two times a day  cefTRIAXone Injectable. 1000 milliGRAM(s) IV Push every 24 hours  cholecalciferol 2000 Unit(s) Oral daily  cyanocobalamin 2000 MICROGram(s) Oral daily  dextrose 5%. 1000 milliLiter(s) (50 mL/Hr) IV Continuous <Continuous>  dextrose 5%. 1000 milliLiter(s) (100 mL/Hr) IV Continuous <Continuous>  dextrose 50% Injectable 25 Gram(s) IV Push once  dextrose 50% Injectable 12.5 Gram(s) IV Push once  dextrose 50% Injectable 25 Gram(s) IV Push once  enoxaparin Injectable 40 milliGRAM(s) SubCutaneous every 24 hours  ferrous    sulfate 325 milliGRAM(s) Oral daily  folic acid 1 milliGRAM(s) Oral daily  gabapentin 600 milliGRAM(s) Oral at bedtime  gabapentin 400 milliGRAM(s) Oral daily  gabapentin 400 milliGRAM(s) Oral <User Schedule>  glucagon  Injectable 1 milliGRAM(s) IntraMuscular once  hydrALAZINE 25 milliGRAM(s) Oral at bedtime  influenza  Vaccine (HIGH DOSE) 0.7 milliLiter(s) IntraMuscular once  insulin glargine Injectable (LANTUS) 50 Unit(s) SubCutaneous at bedtime  insulin lispro (ADMELOG) corrective regimen sliding scale   SubCutaneous three times a day before meals  insulin lispro (ADMELOG) corrective regimen sliding scale   SubCutaneous at bedtime  multivitamin 1 Tablet(s) Oral daily  oxybutynin 5 milliGRAM(s) Oral daily  pantoprazole    Tablet 40 milliGRAM(s) Oral before breakfast  polyethylene glycol 3350 17 Gram(s) Oral daily  pyridoxine 50 milliGRAM(s) Oral daily  senna 2 Tablet(s) Oral at bedtime  sodium chloride 1 Gram(s) Oral two times a day  tamsulosin 0.4 milliGRAM(s) Oral at bedtime  thiamine 100 milliGRAM(s) Oral daily  torsemide 10 milliGRAM(s) Oral daily  valsartan 320 milliGRAM(s) Oral daily      LABS: All Labs Reviewed:                        9.9    5.49  )-----------( 146      ( 14 Nov 2022 12:15 )             30.7     11-14    129<L>  |  94<L>  |  14  ----------------------------<  182<H>  4.2   |  28  |  1.09    Ca    9.7      14 Nov 2022 12:15    TPro  7.4  /  Alb  3.4  /  TBili  0.4  /  DBili  x   /  AST  19  /  ALT  20  /  AlkPhos  85  11-14    PT/INR - ( 14 Nov 2022 12:15 )   PT: 13.2 sec;   INR: 1.14 ratio   PTT - ( 14 Nov 2022 12:15 )  PTT:32.6 sec      RADIOLOGY/EKG:    < from: CT Abdomen and Pelvis w/ IV Cont (11.14.22 @ 15:13) >    IMPRESSION:    Bilateral pleural effusions.  Small focus ofair in the pleural space of the right posterior   costophrenic angle.  Septal thickening, could reflect interstitial edema.  Partially imaged airspace consolidation right middle left lingula lobe,   may reflect infection/pneumonia.  Right paracardiac/epiphrenic lymph nodes, stable.    Recommend further evaluation with CT scan of the chest.    Possible cirrhotic morphology.  Poorly defined low-attenuation posterior right hepatic lobe.  Recommend further evaluation with MRI if there are no clinical  contraindications, to exclude discrete hepatic mass.    Mild splenomegaly.    Generalized colonic fecal retention.    Patchy decreased enhancement midpole right kidney may be associated with   infection/pyelonephritis.    Heterogeneous appearance of the uterus which is deviated to the right.  Recommend further evaluation with pelvic ultrasonography.    Other findings as discussed above.      --- End of Report ---    HERBER MOORE MD; Attending Radiologist  This document hasbeen electronically signed. Nov 14 2022  4:45PM    < end of copied text >

## 2022-11-16 NOTE — PROGRESS NOTE ADULT - ASSESSMENT
PROBLEMS:    UTI/Pyelonephritis  Community Acq Pna , R ML and Infr L lingula  Parainfluenza virus Positive  H/O GB Ca not a candidate for chemotherapy or radiation, per daughter  Cirrhosis   DM    PLAN:    pulmonary stable  IV Rocephin+ Zithromax  Isolation  Blood and sputum cx  Swallow eval  ISS and lantus  Palliative CARE  DVT proph: lovenox  DNR-MOLST completed+pt's daughter who is the HCP

## 2022-11-16 NOTE — PHYSICAL THERAPY INITIAL EVALUATION ADULT - DIAGNOSIS, PT EVAL
Weakness and failure to thrive multifactorial due to UTI / Pyelonephritis/ parainfluenza and community acquired PNA

## 2022-11-16 NOTE — PROGRESS NOTE ADULT - SUBJECTIVE AND OBJECTIVE BOX
Subjective:    pat better, lying in bed, no respiratory complaint.    Home Medications:  Artificial Tears ophthalmic solution: 1 drop(s) to each affected eye 4 times a day, As Needed (2022 21:40)  Aspirin Enteric Coated 81 mg oral delayed release tablet: 1 tab(s) orally once a day (2022 21:40)  Caltrate 600 mg oral tablet: 1 tab(s) orally 2 times a day (2022 21:40)  cyanocobalamin 1000 mcg oral tablet: 2 tab(s) orally once a day (2022 21:40)  ferrous sulfate 325 mg (65 mg elemental iron) oral tablet: 1 tab(s) orally once a day (2022 21:40)  folic acid 1 mg oral tablet: 1 tab(s) orally once a day (2022 21:40)  gabapentin 300 mg oral capsule: 2 cap(s) orally once a day (at bedtime) (2022 21:40)  gabapentin 400 mg oral capsule: 1 cap(s) orally 2 times a day in the morning and with lunch (:40)  hydrALAZINE 25 mg oral tablet: 1 tab(s) orally once a day (at bedtime) (2022 21:40)  Januvia 100 mg oral tablet: 1 tab(s) orally once a day (2022 21:40)  Levemir 100 units/mL subcutaneous solution: 68 unit(s) subcutaneously once a day (at bedtime) (2022 21:40)  metFORMIN 1000 mg oral tablet: 1 tab(s) orally once a day (2022 21:40)  morphine: 5 milligram(s) orally every 6 hours, As Needed (2022 21:40)  Multiple Vitamins oral tablet: 1 tab(s) orally once a day (2022 21:40)  pantoprazole 20 mg oral delayed release tablet: 1 tab(s) orally once a day (2022 21:40)  Saline Nasal Mist 0.65% nasal solution: 2 spray(s) nasal every 2 hours, As Needed (2022 21:40)  senna 8.6 mg oral tablet: 2 tab(s) orally once a day (at bedtime) (2022 21:40)  sodium chloride 1 g oral tablet: 1 tab(s) orally 2 times a day (2022 21:40)  solifenacin 10 mg oral tablet: 1 tab(s) orally once a day (2022 21:40)  valsartan 320 mg oral tablet: 1 tab(s) orally once a day (2022 21:40)  Vitamin B1 100 mg oral tablet: 1 tab(s) orally once a day (2022 21:40)  Vitamin B6 50 mg oral tablet: 1 tab(s) orally once a day (2022 21:40)  Vitamin D3 25 mcg (1000 intl units) oral tablet: 1 tab(s) orally once a day (2022 21:40)    MEDICATIONS  (STANDING):  artificial  tears Solution 1 Drop(s) Both EYES four times a day  aspirin enteric coated 81 milliGRAM(s) Oral daily  azithromycin  IVPB      azithromycin  IVPB 500 milliGRAM(s) IV Intermittent every 24 hours  calcium carbonate    500 mG (Tums) Chewable 1 Tablet(s) Chew two times a day  cefTRIAXone Injectable. 1000 milliGRAM(s) IV Push every 24 hours  cholecalciferol 2000 Unit(s) Oral daily  cyanocobalamin 2000 MICROGram(s) Oral daily  dextrose 5%. 1000 milliLiter(s) (50 mL/Hr) IV Continuous <Continuous>  dextrose 5%. 1000 milliLiter(s) (100 mL/Hr) IV Continuous <Continuous>  dextrose 50% Injectable 25 Gram(s) IV Push once  dextrose 50% Injectable 12.5 Gram(s) IV Push once  dextrose 50% Injectable 25 Gram(s) IV Push once  enoxaparin Injectable 40 milliGRAM(s) SubCutaneous every 24 hours  ferrous    sulfate 325 milliGRAM(s) Oral daily  folic acid 1 milliGRAM(s) Oral daily  gabapentin 600 milliGRAM(s) Oral at bedtime  gabapentin 400 milliGRAM(s) Oral daily  gabapentin 400 milliGRAM(s) Oral <User Schedule>  glucagon  Injectable 1 milliGRAM(s) IntraMuscular once  hydrALAZINE 25 milliGRAM(s) Oral at bedtime  influenza  Vaccine (HIGH DOSE) 0.7 milliLiter(s) IntraMuscular once  insulin glargine Injectable (LANTUS) 50 Unit(s) SubCutaneous at bedtime  insulin lispro (ADMELOG) corrective regimen sliding scale   SubCutaneous three times a day before meals  insulin lispro (ADMELOG) corrective regimen sliding scale   SubCutaneous at bedtime  multivitamin 1 Tablet(s) Oral daily  oxybutynin 5 milliGRAM(s) Oral daily  pantoprazole    Tablet 40 milliGRAM(s) Oral before breakfast  polyethylene glycol 3350 17 Gram(s) Oral daily  pyridoxine 50 milliGRAM(s) Oral daily  senna 2 Tablet(s) Oral at bedtime  sodium chloride 1 Gram(s) Oral two times a day  tamsulosin 0.4 milliGRAM(s) Oral at bedtime  thiamine 100 milliGRAM(s) Oral daily  torsemide 10 milliGRAM(s) Oral daily  valsartan 320 milliGRAM(s) Oral daily    MEDICATIONS  (PRN):  dextrose Oral Gel 15 Gram(s) Oral once PRN Blood Glucose LESS THAN 70 milliGRAM(s)/deciliter  guaifenesin/dextromethorphan Oral Liquid 10 milliLiter(s) Oral every 4 hours PRN Cough  morphine   Solution 5 milliGRAM(s) Oral every 6 hours PRN Moderate Pain (4 - 6)  sodium chloride 0.65% Nasal 1 Spray(s) Both Nostrils every 2 hours PRN Nasal Congestion      Allergies    No Known Allergies    Intolerances        Vital Signs Last 24 Hrs  T(C): 37.1 (2022 08:03), Max: 37.2 (15 Nov 2022 21:16)  T(F): 98.7 (2022 08:03), Max: 99 (15 Nov 2022 21:16)  HR: 66 (2022 08:03) (66 - 73)  BP: 118/45 (2022 08:03) (118/45 - 148/50)  BP(mean): 74 (15 Nov 2022 21:16) (74 - 74)  RR: 18 (2022 08:03) (18 - 18)  SpO2: 94% (2022 08:03) (91% - 94%)    Parameters below as of 2022 08:03  Patient On (Oxygen Delivery Method): room air          PHYSICAL EXAMINATION:    NECK:  Supple. No lymphadenopathy. Jugular venous pressure not elevated. Carotids equal.   HEART:   The cardiac impulse has a normal quality. Reg., Nl S1 and S2.  There are no murmurs, rubs or gallops noted  CHEST:  Chest crackles to auscultation. Normal respiratory effort.  ABDOMEN:  Soft and nontender.   EXTREMITIES:  There is no edema.       LABS:                        10.3   5.25  )-----------( 155      ( 2022 08:46 )             31.4     11-16    130<L>  |  97  |  12  ----------------------------<  157<H>  4.2   |  27  |  0.94    Ca    9.8      2022 08:46    TPro  7.3  /  Alb  3.2<L>  /  TBili  0.4  /  DBili  x   /  AST  22  /  ALT  18  /  AlkPhos  82  11-16      Urinalysis Basic - ( 2022 12:50 )    Color: Yellow / Appearance: Clear / S.005 / pH: x  Gluc: x / Ketone: Negative  / Bili: Negative / Urobili: Negative   Blood: x / Protein: Negative / Nitrite: Positive   Leuk Esterase: Moderate / RBC: 3-5 /HPF / WBC 11-25   Sq Epi: x / Non Sq Epi: Occasional / Bacteria: Moderate

## 2022-11-17 ENCOUNTER — TRANSCRIPTION ENCOUNTER (OUTPATIENT)
Age: 72
End: 2022-11-17

## 2022-11-17 VITALS
DIASTOLIC BLOOD PRESSURE: 40 MMHG | SYSTOLIC BLOOD PRESSURE: 120 MMHG | OXYGEN SATURATION: 96 % | TEMPERATURE: 99 F | RESPIRATION RATE: 18 BRPM | HEART RATE: 65 BPM

## 2022-11-17 PROCEDURE — 99239 HOSP IP/OBS DSCHRG MGMT >30: CPT

## 2022-11-17 PROCEDURE — 99232 SBSQ HOSP IP/OBS MODERATE 35: CPT

## 2022-11-17 RX ORDER — CEFUROXIME AXETIL 250 MG
1 TABLET ORAL
Qty: 1 | Refills: 0
Start: 2022-11-17 | End: 2022-11-17

## 2022-11-17 RX ORDER — POLYETHYLENE GLYCOL 3350 17 G/17G
17 POWDER, FOR SOLUTION ORAL
Qty: 510 | Refills: 0
Start: 2022-11-17 | End: 2022-12-16

## 2022-11-17 RX ORDER — AZITHROMYCIN 500 MG/1
1 TABLET, FILM COATED ORAL
Qty: 1 | Refills: 0
Start: 2022-11-17 | End: 2022-11-17

## 2022-11-17 RX ADMIN — GABAPENTIN 400 MILLIGRAM(S): 400 CAPSULE ORAL at 05:16

## 2022-11-17 RX ADMIN — Medication 81 MILLIGRAM(S): at 10:49

## 2022-11-17 RX ADMIN — Medication 1 TABLET(S): at 10:51

## 2022-11-17 RX ADMIN — Medication 1 TABLET(S): at 10:47

## 2022-11-17 RX ADMIN — Medication 10 MILLIGRAM(S): at 10:49

## 2022-11-17 RX ADMIN — GABAPENTIN 400 MILLIGRAM(S): 400 CAPSULE ORAL at 10:51

## 2022-11-17 RX ADMIN — SODIUM CHLORIDE 1 GRAM(S): 9 INJECTION INTRAMUSCULAR; INTRAVENOUS; SUBCUTANEOUS at 10:49

## 2022-11-17 RX ADMIN — PANTOPRAZOLE SODIUM 40 MILLIGRAM(S): 20 TABLET, DELAYED RELEASE ORAL at 05:16

## 2022-11-17 RX ADMIN — CEFTRIAXONE 1000 MILLIGRAM(S): 500 INJECTION, POWDER, FOR SOLUTION INTRAMUSCULAR; INTRAVENOUS at 10:45

## 2022-11-17 RX ADMIN — Medication 1: at 07:45

## 2022-11-17 RX ADMIN — Medication 100 MILLIGRAM(S): at 10:47

## 2022-11-17 RX ADMIN — VALSARTAN 320 MILLIGRAM(S): 80 TABLET ORAL at 10:49

## 2022-11-17 RX ADMIN — Medication 2: at 11:54

## 2022-11-17 RX ADMIN — Medication 5 MILLIGRAM(S): at 10:50

## 2022-11-17 RX ADMIN — Medication 50 MILLIGRAM(S): at 10:49

## 2022-11-17 RX ADMIN — Medication 1 DROP(S): at 05:17

## 2022-11-17 RX ADMIN — Medication 325 MILLIGRAM(S): at 10:47

## 2022-11-17 RX ADMIN — POLYETHYLENE GLYCOL 3350 17 GRAM(S): 17 POWDER, FOR SOLUTION ORAL at 10:45

## 2022-11-17 RX ADMIN — PREGABALIN 2000 MICROGRAM(S): 225 CAPSULE ORAL at 10:48

## 2022-11-17 RX ADMIN — Medication 1 MILLIGRAM(S): at 10:47

## 2022-11-17 RX ADMIN — Medication 2000 UNIT(S): at 10:46

## 2022-11-17 RX ADMIN — Medication 1 DROP(S): at 10:56

## 2022-11-17 NOTE — PROGRESS NOTE ADULT - ASSESSMENT
Pt is a  71 y/o female largely bed bound with PMHx of CVA about 9 years ago on Aspirin/statin, chronic hyponatremia on salt tablets, Diabetes w/diabetic neuropathy,  Anemia of chronic disease,  Multifocal Pna 2 /2021, Gall bladder CA, and abd pain who was previously on home hospice and now presents to [place]  ED on 11/14 for evaluation of worsening weakness, dry cough, decreased oral intake; son in law is sick with a cough. History per medical record as patient is not providing details.     1. Patient admitted with viral infection secondary to Human Parainfluenza #4 virus, possibly with superimposed pneumonia, versus chf exacerbation, as the the cause of her respiratory symptoms  - follow up cultures   - serial cbc and monitor temperature   - oxygen and nebs as needed   - iv hydration and supportive care   - reviewed prior medical records to evaluate for resistant or atypical pathogens   - will treat for community acquired pneumonia with ceftriaxone and zithromax for about 5 days, day #3-4  - will stop antibiotics after doses of 11/19  - continue droplet and contact isolation  2. other issues; per medicine

## 2022-11-17 NOTE — DISCHARGE NOTE PROVIDER - NSDCCPCAREPLAN_GEN_ALL_CORE_FT
PRINCIPAL DISCHARGE DIAGNOSIS  Diagnosis: Acute UTI  Assessment and Plan of Treatment: A urinary tract infection (UTI) is caused by bacteria that get inside your urinary tract. Your urinary tract includes your kidneys, ureters, bladder, and urethra. Continue to stay hydrated. To prevent urinary tract infections – wear cotton underwear or loose clothing, women should wipe front to back after urinating or having a bowel movement, drink cranberry juice or use cranberry supplements may help prevent UTIs. **Monitor for the following signs/symptoms: Fever > 101, chills, pain or burning with urination, foul smelling or cloudy urine, confusion, weakness or fatigue. If you experience these signs/symptoms please alert your primary care provider or if your signs/symptoms are severe please return to the ED**   ~*~*~ continue with antibiotics for one more day ~*~*      SECONDARY DISCHARGE DIAGNOSES  Diagnosis: Weakness  Assessment and Plan of Treatment:

## 2022-11-17 NOTE — DISCHARGE NOTE PROVIDER - NSDCMRMEDTOKEN_GEN_ALL_CORE_FT
Artificial Tears ophthalmic solution: 1 drop(s) to each affected eye 4 times a day, As Needed  Aspirin Enteric Coated 81 mg oral delayed release tablet: 1 tab(s) orally once a day  azithromycin 500 mg oral tablet: 1 tab(s) orally once a day  Caltrate 600 mg oral tablet: 1 tab(s) orally 2 times a day  cefuroxime 500 mg oral tablet: 1 tab(s) orally once a day   cyanocobalamin 1000 mcg oral tablet: 2 tab(s) orally once a day  ferrous sulfate 325 mg (65 mg elemental iron) oral tablet: 1 tab(s) orally once a day  folic acid 1 mg oral tablet: 1 tab(s) orally once a day  gabapentin 300 mg oral capsule: 2 cap(s) orally once a day (at bedtime)  gabapentin 400 mg oral capsule: 1 cap(s) orally 2 times a day in the morning and with lunch  hydrALAZINE 25 mg oral tablet: 1 tab(s) orally once a day (at bedtime)  Januvia 100 mg oral tablet: 1 tab(s) orally once a day  Levemir 100 units/mL subcutaneous solution: 68 unit(s) subcutaneously once a day (at bedtime)  metFORMIN 1000 mg oral tablet: 1 tab(s) orally once a day  morphine: 5 milligram(s) orally every 6 hours, As Needed  Multiple Vitamins oral tablet: 1 tab(s) orally once a day  pantoprazole 20 mg oral delayed release tablet: 1 tab(s) orally once a day  polyethylene glycol 3350 oral powder for reconstitution: 17 gram(s) orally once a day  Saline Nasal Mist 0.65% nasal solution: 2 spray(s) nasal every 2 hours, As Needed  senna 8.6 mg oral tablet: 2 tab(s) orally once a day (at bedtime)  sodium chloride 1 g oral tablet: 1 tab(s) orally 2 times a day  solifenacin 10 mg oral tablet: 1 tab(s) orally once a day  tamsulosin 0.4 mg oral capsule: 1 cap(s) orally once a day (at bedtime)  valsartan 320 mg oral tablet: 1 tab(s) orally once a day  Vitamin B1 100 mg oral tablet: 1 tab(s) orally once a day  Vitamin B6 50 mg oral tablet: 1 tab(s) orally once a day  Vitamin D3 25 mcg (1000 intl units) oral tablet: 1 tab(s) orally once a day

## 2022-11-17 NOTE — PROGRESS NOTE ADULT - SUBJECTIVE AND OBJECTIVE BOX
Subjective:    pat better, lying in bed, no new complaint.    Home Medications:  Artificial Tears ophthalmic solution: 1 drop(s) to each affected eye 4 times a day, As Needed (14 Nov 2022 21:40)  Aspirin Enteric Coated 81 mg oral delayed release tablet: 1 tab(s) orally once a day (14 Nov 2022 21:40)  Caltrate 600 mg oral tablet: 1 tab(s) orally 2 times a day (14 Nov 2022 21:40)  cyanocobalamin 1000 mcg oral tablet: 2 tab(s) orally once a day (14 Nov 2022 21:40)  ferrous sulfate 325 mg (65 mg elemental iron) oral tablet: 1 tab(s) orally once a day (14 Nov 2022 21:40)  folic acid 1 mg oral tablet: 1 tab(s) orally once a day (14 Nov 2022 21:40)  gabapentin 300 mg oral capsule: 2 cap(s) orally once a day (at bedtime) (14 Nov 2022 21:40)  gabapentin 400 mg oral capsule: 1 cap(s) orally 2 times a day in the morning and with lunch (14 Nov 2022 21:40)  hydrALAZINE 25 mg oral tablet: 1 tab(s) orally once a day (at bedtime) (14 Nov 2022 21:40)  Januvia 100 mg oral tablet: 1 tab(s) orally once a day (14 Nov 2022 21:40)  Levemir 100 units/mL subcutaneous solution: 68 unit(s) subcutaneously once a day (at bedtime) (14 Nov 2022 21:40)  metFORMIN 1000 mg oral tablet: 1 tab(s) orally once a day (14 Nov 2022 21:40)  morphine: 5 milligram(s) orally every 6 hours, As Needed (14 Nov 2022 21:40)  Multiple Vitamins oral tablet: 1 tab(s) orally once a day (14 Nov 2022 21:40)  pantoprazole 20 mg oral delayed release tablet: 1 tab(s) orally once a day (14 Nov 2022 21:40)  Saline Nasal Mist 0.65% nasal solution: 2 spray(s) nasal every 2 hours, As Needed (14 Nov 2022 21:40)  senna 8.6 mg oral tablet: 2 tab(s) orally once a day (at bedtime) (14 Nov 2022 21:40)  sodium chloride 1 g oral tablet: 1 tab(s) orally 2 times a day (14 Nov 2022 21:40)  solifenacin 10 mg oral tablet: 1 tab(s) orally once a day (14 Nov 2022 21:40)  valsartan 320 mg oral tablet: 1 tab(s) orally once a day (14 Nov 2022 21:40)  Vitamin B1 100 mg oral tablet: 1 tab(s) orally once a day (14 Nov 2022 21:40)  Vitamin B6 50 mg oral tablet: 1 tab(s) orally once a day (14 Nov 2022 21:40)  Vitamin D3 25 mcg (1000 intl units) oral tablet: 1 tab(s) orally once a day (14 Nov 2022 21:40)    MEDICATIONS  (STANDING):  artificial  tears Solution 1 Drop(s) Both EYES four times a day  aspirin enteric coated 81 milliGRAM(s) Oral daily  azithromycin  IVPB      azithromycin  IVPB 500 milliGRAM(s) IV Intermittent every 24 hours  calcium carbonate    500 mG (Tums) Chewable 1 Tablet(s) Chew two times a day  cefTRIAXone Injectable. 1000 milliGRAM(s) IV Push every 24 hours  cholecalciferol 2000 Unit(s) Oral daily  cyanocobalamin 2000 MICROGram(s) Oral daily  dextrose 5%. 1000 milliLiter(s) (50 mL/Hr) IV Continuous <Continuous>  dextrose 5%. 1000 milliLiter(s) (100 mL/Hr) IV Continuous <Continuous>  dextrose 50% Injectable 25 Gram(s) IV Push once  dextrose 50% Injectable 12.5 Gram(s) IV Push once  dextrose 50% Injectable 25 Gram(s) IV Push once  enoxaparin Injectable 40 milliGRAM(s) SubCutaneous every 24 hours  ferrous    sulfate 325 milliGRAM(s) Oral daily  folic acid 1 milliGRAM(s) Oral daily  gabapentin 600 milliGRAM(s) Oral at bedtime  gabapentin 400 milliGRAM(s) Oral daily  gabapentin 400 milliGRAM(s) Oral <User Schedule>  glucagon  Injectable 1 milliGRAM(s) IntraMuscular once  hydrALAZINE 25 milliGRAM(s) Oral at bedtime  influenza  Vaccine (HIGH DOSE) 0.7 milliLiter(s) IntraMuscular once  insulin glargine Injectable (LANTUS) 50 Unit(s) SubCutaneous at bedtime  insulin lispro (ADMELOG) corrective regimen sliding scale   SubCutaneous three times a day before meals  insulin lispro (ADMELOG) corrective regimen sliding scale   SubCutaneous at bedtime  multivitamin 1 Tablet(s) Oral daily  oxybutynin 5 milliGRAM(s) Oral daily  pantoprazole    Tablet 40 milliGRAM(s) Oral before breakfast  polyethylene glycol 3350 17 Gram(s) Oral daily  pyridoxine 50 milliGRAM(s) Oral daily  senna 2 Tablet(s) Oral at bedtime  sodium chloride 1 Gram(s) Oral two times a day  tamsulosin 0.4 milliGRAM(s) Oral at bedtime  thiamine 100 milliGRAM(s) Oral daily  torsemide 10 milliGRAM(s) Oral daily  valsartan 320 milliGRAM(s) Oral daily    MEDICATIONS  (PRN):  dextrose Oral Gel 15 Gram(s) Oral once PRN Blood Glucose LESS THAN 70 milliGRAM(s)/deciliter  guaifenesin/dextromethorphan Oral Liquid 10 milliLiter(s) Oral every 4 hours PRN Cough  morphine   Solution 5 milliGRAM(s) Oral every 6 hours PRN Moderate Pain (4 - 6)  sodium chloride 0.65% Nasal 1 Spray(s) Both Nostrils every 2 hours PRN Nasal Congestion      Allergies    No Known Allergies    Intolerances        Vital Signs Last 24 Hrs  T(C): 37.1 (17 Nov 2022 07:43), Max: 37.1 (16 Nov 2022 21:00)  T(F): 98.8 (17 Nov 2022 07:43), Max: 98.8 (17 Nov 2022 07:43)  HR: 65 (17 Nov 2022 07:43) (64 - 65)  BP: 120/40 (17 Nov 2022 07:43) (120/40 - 123/62)  BP(mean): 72 (16 Nov 2022 21:00) (72 - 72)  RR: 18 (17 Nov 2022 07:43) (18 - 18)  SpO2: 96% (17 Nov 2022 07:43) (95% - 96%)    Parameters below as of 17 Nov 2022 07:43  Patient On (Oxygen Delivery Method): room air          PHYSICAL EXAMINATION:    NECK:  Supple. No lymphadenopathy. Jugular venous pressure not elevated. Carotids equal.   HEART:   The cardiac impulse has a normal quality. Reg., Nl S1 and S2.  There are no murmurs, rubs or gallops noted  CHEST:  Chest crackles to auscultation. Normal respiratory effort.  ABDOMEN:  Soft and nontender.   EXTREMITIES:  There is no edema.       LABS:                        10.3   5.25  )-----------( 155      ( 16 Nov 2022 08:46 )             31.4     11-16    130<L>  |  97  |  12  ----------------------------<  157<H>  4.2   |  27  |  0.94    Ca    9.8      16 Nov 2022 08:46    TPro  7.3  /  Alb  3.2<L>  /  TBili  0.4  /  DBili  x   /  AST  22  /  ALT  18  /  AlkPhos  82  11-16

## 2022-11-17 NOTE — PROGRESS NOTE ADULT - SUBJECTIVE AND OBJECTIVE BOX
Date of service: 11-17-22 @ 11:03      Patient lying in bed; not coughing, feels weak, afebrile      ROS unable to obtain secondary to patient medical condition     MEDICATIONS  (STANDING):  artificial  tears Solution 1 Drop(s) Both EYES four times a day  aspirin enteric coated 81 milliGRAM(s) Oral daily  azithromycin  IVPB      azithromycin  IVPB 500 milliGRAM(s) IV Intermittent every 24 hours  calcium carbonate    500 mG (Tums) Chewable 1 Tablet(s) Chew two times a day  cefTRIAXone Injectable. 1000 milliGRAM(s) IV Push every 24 hours  cholecalciferol 2000 Unit(s) Oral daily  cyanocobalamin 2000 MICROGram(s) Oral daily  dextrose 5%. 1000 milliLiter(s) (50 mL/Hr) IV Continuous <Continuous>  dextrose 5%. 1000 milliLiter(s) (100 mL/Hr) IV Continuous <Continuous>  dextrose 50% Injectable 25 Gram(s) IV Push once  dextrose 50% Injectable 12.5 Gram(s) IV Push once  dextrose 50% Injectable 25 Gram(s) IV Push once  enoxaparin Injectable 40 milliGRAM(s) SubCutaneous every 24 hours  ferrous    sulfate 325 milliGRAM(s) Oral daily  folic acid 1 milliGRAM(s) Oral daily  gabapentin 600 milliGRAM(s) Oral at bedtime  gabapentin 400 milliGRAM(s) Oral daily  gabapentin 400 milliGRAM(s) Oral <User Schedule>  glucagon  Injectable 1 milliGRAM(s) IntraMuscular once  hydrALAZINE 25 milliGRAM(s) Oral at bedtime  influenza  Vaccine (HIGH DOSE) 0.7 milliLiter(s) IntraMuscular once  insulin glargine Injectable (LANTUS) 50 Unit(s) SubCutaneous at bedtime  insulin lispro (ADMELOG) corrective regimen sliding scale   SubCutaneous three times a day before meals  insulin lispro (ADMELOG) corrective regimen sliding scale   SubCutaneous at bedtime  multivitamin 1 Tablet(s) Oral daily  oxybutynin 5 milliGRAM(s) Oral daily  pantoprazole    Tablet 40 milliGRAM(s) Oral before breakfast  polyethylene glycol 3350 17 Gram(s) Oral daily  pyridoxine 50 milliGRAM(s) Oral daily  senna 2 Tablet(s) Oral at bedtime  sodium chloride 1 Gram(s) Oral two times a day  tamsulosin 0.4 milliGRAM(s) Oral at bedtime  thiamine 100 milliGRAM(s) Oral daily  torsemide 10 milliGRAM(s) Oral daily  valsartan 320 milliGRAM(s) Oral daily    MEDICATIONS  (PRN):  dextrose Oral Gel 15 Gram(s) Oral once PRN Blood Glucose LESS THAN 70 milliGRAM(s)/deciliter  guaifenesin/dextromethorphan Oral Liquid 10 milliLiter(s) Oral every 4 hours PRN Cough  morphine   Solution 5 milliGRAM(s) Oral every 6 hours PRN Moderate Pain (4 - 6)  sodium chloride 0.65% Nasal 1 Spray(s) Both Nostrils every 2 hours PRN Nasal Congestion      Vital Signs Last 24 Hrs  T(C): 37.1 (17 Nov 2022 07:43), Max: 37.1 (16 Nov 2022 21:00)  T(F): 98.8 (17 Nov 2022 07:43), Max: 98.8 (17 Nov 2022 07:43)  HR: 65 (17 Nov 2022 07:43) (64 - 77)  BP: 120/40 (17 Nov 2022 07:43) (120/40 - 130/50)  BP(mean): 72 (16 Nov 2022 21:00) (72 - 72)  RR: 18 (17 Nov 2022 07:43) (18 - 18)  SpO2: 96% (17 Nov 2022 07:43) (94% - 96%)    Parameters below as of 17 Nov 2022 07:43  Patient On (Oxygen Delivery Method): room air            Physical Exam:        Constitutional: frail looking  HEENT: NC/AT, EOMI, PERRLA, conjunctivae clear; ears and nose atraumatic; pharynx clear  Neck: supple; thyroid not palpable  Back: no tenderness  Respiratory: respiratory effort normal; clear to auscultation  Cardiovascular: S1S2 regular, no murmurs  Abdomen: soft, not tender, not distended, positive BS; no liver or spleen organomegaly  Genitourinary: no suprapubic tenderness  Musculoskeletal: no muscle tenderness, no joint swelling or tenderness  Neurological/ Psychiatric: moving all extremities  Skin: no rashes; no palpable lesions    Labs: all available labs reviewed                         Labs:                        10.3   5.25  )-----------( 155      ( 16 Nov 2022 08:46 )             31.4     11-16    130<L>  |  97  |  12  ----------------------------<  157<H>  4.2   |  27  |  0.94    Ca    9.8      16 Nov 2022 08:46    TPro  7.3  /  Alb  3.2<L>  /  TBili  0.4  /  DBili  x   /  AST  22  /  ALT  18  /  AlkPhos  82  11-16           Cultures:       Culture - Blood (collected 11-14-22 @ 13:35)  Source: .Blood Blood-Peripheral  Preliminary Report (11-15-22 @ 19:01):    No growth to date.    Culture - Blood (collected 11-14-22 @ 13:35)  Source: .Blood Blood-Peripheral  Preliminary Report (11-15-22 @ 19:01):    No growth to date.    Culture - Urine (collected 11-14-22 @ 12:50)  Source: Clean Catch Clean Catch (Midstream)  Final Report (11-15-22 @ 18:43):    >=3 organisms. Probable collection contamination.            Respiratory Viral Panel with COVID-19 by JYOTI (11.14.22 @ 12:15)    Rapid RVP Result: Detected    S[facility]CoV-2: [facility] This Res[facility] Panel uses polymerase chain reaction (PCR) to detect for  adenovirus; coronavirus (HKU1, NL63, 229E, OC43); human metapneumovirus  (hMPV); human enterovirus/rhinovirus (Entero/RV); influenza A; influenza  A/H1; influenza A/H3; influenza A/H1-2009; influenza B; parainfluenza  viruses 1, 2, 3, 4; respiratory syncytial virus; Mycoplasma pneumoniae;  Chlamydophila pneumoniae; and SARS-CoV-2.    Parainfluenza 4 (RapRVP): Detected      < from: Xray Chest 1 View- PORTABLE-Routine (Xray Chest 1 View- PORTABLE-Routine .) (11.14.22 @ 13:13) >  IMPRESSION:   Constellation of findings suggestive of CHF..    < end of copied text >          Radiology: all available radiological tests reviewed    Advanced directives addressed: full resuscitation

## 2022-11-17 NOTE — DISCHARGE NOTE PROVIDER - DETAILS OF MALNUTRITION DIAGNOSIS/DIAGNOSES
This patient has been assessed with a concern for Malnutrition and was treated during this hospitalization for the following Nutrition diagnosis/diagnoses:     -  11/15/2022: Severe protein-calorie malnutrition

## 2022-11-17 NOTE — DISCHARGE NOTE NURSING/CASE MANAGEMENT/SOCIAL WORK - PATIENT PORTAL LINK FT
You can access the FollowMyHealth Patient Portal offered by [facility] by registering at the following website: [email or web address]. By joining [facility]’s FollowMyHealth portal, you will also be able to view your health information using other applications (apps) compatible with our system.

## 2022-11-17 NOTE — DISCHARGE NOTE PROVIDER - HOSPITAL COURSE
pt admitted for UTI, weakness and chronic constipation w/ concerns for community acquired PNA as well as parainfluenza. pt was provided for suppportive care, pt was given Ceftrixaone and Azithro for 4 days per reccs from ID Dr. Sales. urine culture with 3+ organism. pt evaled by Pulm Dr. Jay carcamo as mentioned. pt was placed on bowel regimen with + BM. no other treatments during admission. pt will be dc back on Home Hosp[facility]. discussed care with pt hospice provider Dr. Kidd   11/7- pt sitting up in bed, daughter at bedside. feeling better but still weak and with poor PO intake.     see progress note for PE, vitals, results of lab and imaging         Weakness and failure to thrive multifactorial due to UTI / Pyelonephritis/ parainfluenza and community acquired PNA   - UTI associated with bruce catheter   - s/p 1 L NS in the ED  - cont w/ Rocephin, add Zithromax x 4 days, dc on PO abx for 1 more day.   - s/p droplet isolation  - elevated lactate , likely due to cirrhosis - not septic   - blood cx negative. urine cx 3+ organisms.   - antitussives prn   - Pulm consult  - swallow eval  - Palliative care consult    Chronic constipation   - CT scan with generalized colonic fecal retention.   - add Miralax and lactulose cont upon dc     Chronic Hyponatremia   - was 129 on prior labs since Feb 2021   - cont w/ salt tabs  - trend in AM labs     Diabetes mellitus type 2   - ISS and lantus --- resume PO meds   - on regular diet     Hx of gallbladder CA / liver cirrhosis   - acc to daughter no chemo or radiation therapies available.   - on home hospice care   - supportive care  - analgesics and antiemetics PRN       - DVT proph: s/p lovenox  - DNR, MOLST completed with pt's daughter who is the HCP    daughter at bedside and updated on plan of care       pt admitted for UTI, weakness and chronic constipation w/ concerns for community acquired PNA as well as parainfluenza. pt was provided for suppportive care, pt was given Ceftrixaone and Azithro for 4 days per reccs from ID Dr. Sales. urine culture with 3+ organism. pt evaled by Pulm Dr. Jay carcamo as mentioned. pt was placed on bowel regimen with + BM. no other treatments during admission. pt will be dc back on Home Hosp[facility]. discussed care with pt hospice provider Dr. Kidd   11/7- pt sitting up in bed, daughter at bedside. feeling better but still weak and with poor PO intake.     see progress note for PE, vitals, results of lab and imaging         Weakness and failure to thrive multifactorial due to UTI / Pyelonephritis/ parainfluenza and community acquired PNA   - UTI associated with bruce catheter   - s/p 1 L NS in the ED  - cont w/ Rocephin, add Zithromax x 4 days, dc on PO abx for 1 more day.   - s/p droplet isolation  - elevated lactate , likely due to cirrhosis - not septic   - blood cx negative. urine cx 3+ organisms.   - antitussives prn   - Pulm consult  - swallow eval  - Palliative care consult    Chronic constipation   - CT scan with generalized colonic fecal retention.   - add Miralax and lactulose cont upon dc     Chronic Hyponatremia   - was 129 on prior labs since Feb 2021   - cont w/ salt tabs  - trend in AM labs     Diabetes mellitus type 2   - ISS and lantus --- resume PO meds   - on regular diet     Hx of gallbladder CA / liver cirrhosis   - acc to daughter no chemo or radiation therapies available.   - on home hospice care   - supportive care  - analgesics and antiemetics PRN       - DVT proph: s/p lovenox  - DNR, MOLST completed with pt's daughter who is the HCP    daughter at bedside and updated on plan of care     Attending note: Patient seen and examined with DIANA Krueger  Case reviewed and discussed with her and necessary changes were made  Agree with her assessment and d/c plan.   Spent more than 30 min to prepare the discharge

## 2022-11-17 NOTE — PROGRESS NOTE ADULT - SUBJECTIVE AND OBJECTIVE BOX
HPI:    "73 y/o female largely bed bound with PMHx of CVA about 9 years ago on Aspirin/statin, chronic hyponatremia on salt tablets, Diabetes w/diabetic neuropathy,  Anemia of chronic disease,  Multifocal Pna 2 /2021, Gall bladder CA, and abd pain who was previously on home hospice and now presents to [facility]  [facility] c/o worsening weakness  and increasing abd pain.  Pt's daughter reported pt has been having foul smelling urine and a dry cough.   Pt also had poor PO intake, and no bowel movements for 5 days.  Her daughter reports she has been taking Bactrim for 5 days without improvement.  Pt previously responded well to Milk of Magnesia,  however lately it has not helped her move her bowels.   Sick contact : pt's son-in-law has recently had a cough. ""    11/15    Patient was seen and examined.  Denies nausea, vomiting, shortness of breath, chest pain, headaches, abdominal pain, constipation .   Shes very fatigued reffers to decisions to her daughter speaks english but prefers Sabianist, Bangali or Urdi.     11/17  pt comfortable and in NAD  sleeping in bed resting well  denies any issues this morning    PAIN: ( )Yes   (x )No  Level:   DYSPNEA: ( ) Yes  (x ) No  Level:           Review of Systems:    Anxiety- denies  Depression-denies  Physical Discomfort- in NAD  Dyspnea-denies  Constipation-denies  Diarrhea-denies  Nausea-denies  Vomiting-denies  Anorexia- thin  Weight Loss- ++  Cough-denies  Secretions-denies  Fatigue-++  Weakness-+  Delirium-denies    All other systems reviewed and negative         PHYSICAL EXAM:    Vital Signs Last 24 Hrs  T(C): 37.1 (17 Nov 2022 07:43), Max: 37.1 (16 Nov 2022 21:00)  T(F): 98.8 (17 Nov 2022 07:43), Max: 98.8 (17 Nov 2022 07:43)  HR: 65 (17 Nov 2022 07:43) (64 - 77)  BP: 120/40 (17 Nov 2022 07:43) (120/40 - 130/50)  BP(mean): 72 (16 Nov 2022 21:00) (72 - 72)  RR: 18 (17 Nov 2022 07:43) (18 - 18)  SpO2: 96% (17 Nov 2022 07:43) (94% - 96%)    Parameters below as of 17 Nov 2022 07:43  Patient On (Oxygen Delivery Method): room air      Daily     Daily     PPSV2:30   %  FAST:    General: calm in NAD  Mental Status: awake alert oriented    HEENT: eomi, perrl  Lungs: ctabl b/l bs  Cardiac: s1s2 no mgr  GI: soft nontender +BS  : voids  Ext: moves all 4 extremities spontaneously  Neuro: bedbound follows commands       LABS:                        10.3   5.25  )-----------( 155      ( 16 Nov 2022 08:46 )             31.4     11-16    130<L>  |  97  |  12  ----------------------------<  157<H>  4.2   |  27  |  0.94    Ca    9.8      16 Nov 2022 08:46    TPro  7.3  /  Alb  3.2<L>  /  TBili  0.4  /  DBili  x   /  AST  22  /  ALT  18  /  AlkPhos  82  11-16      Albumin: Albumin, Serum: 3.2 g/dL (11-16 @ 08:46)      Allergies    No Known Allergies    Intolerances      MEDICATIONS  (STANDING):  artificial  tears Solution 1 Drop(s) Both EYES four times a day  aspirin enteric coated 81 milliGRAM(s) Oral daily  azithromycin  IVPB      azithromycin  IVPB 500 milliGRAM(s) IV Intermittent every 24 hours  calcium carbonate    500 mG (Tums) Chewable 1 Tablet(s) Chew two times a day  cefTRIAXone Injectable. 1000 milliGRAM(s) IV Push every 24 hours  cholecalciferol 2000 Unit(s) Oral daily  cyanocobalamin 2000 MICROGram(s) Oral daily  dextrose 5%. 1000 milliLiter(s) (50 mL/Hr) IV Continuous <Continuous>  dextrose 5%. 1000 milliLiter(s) (100 mL/Hr) IV Continuous <Continuous>  dextrose 50% Injectable 25 Gram(s) IV Push once  dextrose 50% Injectable 12.5 Gram(s) IV Push once  dextrose 50% Injectable 25 Gram(s) IV Push once  enoxaparin Injectable 40 milliGRAM(s) SubCutaneous every 24 hours  ferrous    sulfate 325 milliGRAM(s) Oral daily  folic acid 1 milliGRAM(s) Oral daily  gabapentin 600 milliGRAM(s) Oral at bedtime  gabapentin 400 milliGRAM(s) Oral daily  gabapentin 400 milliGRAM(s) Oral <User Schedule>  glucagon  Injectable 1 milliGRAM(s) IntraMuscular once  hydrALAZINE 25 milliGRAM(s) Oral at bedtime  influenza  Vaccine (HIGH DOSE) 0.7 milliLiter(s) IntraMuscular once  insulin glargine Injectable (LANTUS) 50 Unit(s) SubCutaneous at bedtime  insulin lispro (ADMELOG) corrective regimen sliding scale   SubCutaneous three times a day before meals  insulin lispro (ADMELOG) corrective regimen sliding scale   SubCutaneous at bedtime  multivitamin 1 Tablet(s) Oral daily  oxybutynin 5 milliGRAM(s) Oral daily  pantoprazole    Tablet 40 milliGRAM(s) Oral before breakfast  polyethylene glycol 3350 17 Gram(s) Oral daily  pyridoxine 50 milliGRAM(s) Oral daily  senna 2 Tablet(s) Oral at bedtime  sodium chloride 1 Gram(s) Oral two times a day  tamsulosin 0.4 milliGRAM(s) Oral at bedtime  thiamine 100 milliGRAM(s) Oral daily  torsemide 10 milliGRAM(s) Oral daily  valsartan 320 milliGRAM(s) Oral daily    MEDICATIONS  (PRN):  dextrose Oral Gel 15 Gram(s) Oral once PRN Blood Glucose LESS THAN 70 milliGRAM(s)/deciliter  guaifenesin/dextromethorphan Oral Liquid 10 milliLiter(s) Oral every 4 hours PRN Cough  morphine   Solution 5 milliGRAM(s) Oral every 6 hours PRN Moderate Pain (4 - 6)  sodium chloride 0.65% Nasal 1 Spray(s) Both Nostrils every 2 hours PRN Nasal Congestion      RADIOLOGY:

## 2022-11-17 NOTE — PROGRESS NOTE ADULT - REASON FOR ADMISSION
Weakness  UTI, Pyelonephritis  Pna  Constipation

## 2022-11-17 NOTE — PROGRESS NOTE ADULT - ASSESSMENT
PROBLEMS:    UTI/Pyelonephritis  Community Acq Pna , R ML and Infr L lingula  Parainfluenza virus Positive  H/O GB Ca not a candidate for chemotherapy or radiation, per daughter  Cirrhosis   DM    PLAN:    pulmonary stable- decd planning  IV Rocephin+ Zithromax  Isolation  ISS and lantus  Palliative CARE  DVT proph: lovenox

## 2022-11-17 NOTE — DISCHARGE NOTE PROVIDER - CARE PROVIDER_API CALL
Mildred Marcano)  Geriatric Medicine; [facility]; Internal Medicine  [phone] [place] [address]  Phone: [phone]  Fax: [phone]  Follow Up Time:

## 2022-11-17 NOTE — DISCHARGE NOTE PROVIDER - NSDCPNSUBOBJ_GEN_ALL_CORE
All 10 systems reviewed and found to be negative with the exception of what has been described above.    Vital Signs Last 24 Hrs  T(C): 37.1 (17 Nov 2022 07:43), Max: 37.1 (16 Nov 2022 21:00)  T(F): 98.8 (17 Nov 2022 07:43), Max: 98.8 (17 Nov 2022 07:43)  HR: 65 (17 Nov 2022 07:43) (64 - 77)  BP: 120/40 (17 Nov 2022 07:43) (120/40 - 130/50)  BP(mean): 72 (16 Nov 2022 21:00) (72 - 72)  RR: 18 (17 Nov 2022 07:43) (18 - 18)  SpO2: 96% (17 Nov 2022 07:43) (94% - 96%) ---- room air     PHYSICAL EXAM:    Constitutional: NAD, awake and alert, well-developed  HEENT: PERR, EOMI, Normal Hearing, MMM  Neck: Soft and supple, No LAD, No JVD  Respiratory: Breath sounds are clear bilaterally, No wheezing, rales or rhonchi  Cardiovascular: S1 and S2, regular rate and rhythm, no Murmurs, gallops or rubs  Gastrointestinal: Bowel Sounds present, soft, nontender, nondistended, no guarding, no rebound  : bruce cath in place   Extremities: No peripheral edema  Vascular: 2+ peripheral pulses  Neurological: A/O x 3, no focal deficits  Musculoskeletal: 5/5 strength b/l upper and lower extremities  Skin: No rashes      LABS              9.9    5.49  )-----------( 146      ( 14 Nov 2022 12:15 )             30.7     11-14    129<L>  |  94<L>  |  14  ----------------------------<  182<H>  4.2   |  28  |  1.09    Ca    9.7      14 Nov 2022 12:15    TPro  7.4  /  Alb  3.4  /  TBili  0.4  /  DBili  x   /  AST  19  /  ALT  20  /  AlkPhos  85  11-14    PT/INR - ( 14 Nov 2022 12:15 )   PT: 13.2 sec;   INR: 1.14 ratio   PTT - ( 14 Nov 2022 12:15 )  PTT:32.6 sec      < from: CT Abdomen and Pelvis w/ IV Cont (11.14.22 @ 15:13) >    IMPRESSION:    Bilateral pleural effusions.  Small focus ofair in the pleural space of the right posterior   costophrenic angle.  Septal thickening, could reflect interstitial edema.  Partially imaged airspace consolidation right middle left lingula lobe,   may reflect infection/pneumonia.  Right paracardiac/epiphrenic lymph nodes, stable.    Recommend further evaluation with CT scan of the chest.    Possible cirrhotic morphology.  Poorly defined low-attenuation posterior right hepatic lobe.  Recommend further evaluation with MRI if there are no clinical  contraindications, to exclude discrete hepatic mass.    Mild splenomegaly.    Generalized colonic fecal retention.    Patchy decreased enhancement midpole right kidney may be associated with   infection/pyelonephritis.    Heterogeneous appearance of the uterus which is deviated to the right.  Recommend further evaluation with pelvic ultrasonography.    Other findings as discussed above.      --- End of Report ---      HERBER MOORE MD; Attending Radiologist  This document hasbeen electronically signed. Nov 14 2022  4:45PM    < end of copied text >

## 2022-11-17 NOTE — DISCHARGE NOTE PROVIDER - NSDCCAREPROVSEEN_GEN_ALL_CORE_FT
Pancho, Erin Thompson, Leo Malin, Sammie Livingston, Valery Marcano, Mildred Sales, Latoya Moreno, Hayder Krueger, Mona Alonso, Chucho Thibodeaux, Gio HENSON

## 2022-11-17 NOTE — PROGRESS NOTE ADULT - ASSESSMENT
Process of Care  --Reviewed dx/treatment problems and alignment with Goals of Care    complicated UTI - pyelonephritis parainfluenza w/ possible CAP -PNA  c/w abx as per primary  pt on home hospice  --   once completes IV abx hope is to return home w/ hospice    Chronic constipation  Miralax and Lactulose    Hypnoatremia  c/w management      Diabetes mellitus type 2   c/w current managment    Hx of gallbladder CA / liver cirrhosis   cw current management        Physical Aspects of Care  --Pain  patient denies at this time  c/w current management    --Bowel Regimen  denies constipation  risk for constipation d/t immobility  daily dulcolax    --Dyspnea  No SOB at this time  comfortable and in NAD    --Nausea Vomiting  denies    --Weakness  PT as tolerated     Psychological and Psychiatric Aspects of Care:   Grief Bereavement emotional support provided  --Hx of psychiatric dx: none  -Pastoral Care Available PRN     Social Aspects of Care  -SW involved     Cultural Aspects  -Primary Language: English    Goals of Care:     no changes in GoC  pt in NAD comfortable  c/w current managment      Ethical and Legal Aspects:   NA        Capacity: pt w/ capacity defers to daughter  HCP/Surrogate: Lou Gimenez  Code Status: dnr dni  MOLST: dnr dni   Dispo Plan: return to home hospice    Discussed With: Case coordinated with attending and SW and RN     Time Spent: 25 minutes including the care, coordination and counseling of this patient, 50% of which was spent coordinating and counseling.

## 2022-11-19 LAB
CULTURE RESULTS: SIGNIFICANT CHANGE UP
CULTURE RESULTS: SIGNIFICANT CHANGE UP
SPECIMEN SOURCE: SIGNIFICANT CHANGE UP
SPECIMEN SOURCE: SIGNIFICANT CHANGE UP

## 2022-11-23 DIAGNOSIS — D63.8 ANEMIA IN OTHER CHRONIC DISEASES CLASSIFIED ELSEWHERE: ICD-10-CM

## 2022-11-23 DIAGNOSIS — K74.60 UNSPECIFIED CIRRHOSIS OF LIVER: ICD-10-CM

## 2022-11-23 DIAGNOSIS — Z86.73 PERSONAL HISTORY OF TRANSIENT ISCHEMIC ATTACK (TIA), AND CEREBRAL INFARCTION WITHOUT RESIDUAL DEFICITS: ICD-10-CM

## 2022-11-23 DIAGNOSIS — Z74.01 BED CONFINEMENT STATUS: ICD-10-CM

## 2022-11-23 DIAGNOSIS — Z90.49 ACQUIRED ABSENCE OF OTHER SPECIFIED PARTS OF DIGESTIVE TRACT: ICD-10-CM

## 2022-11-23 DIAGNOSIS — Z83.3 FAMILY HISTORY OF DIABETES MELLITUS: ICD-10-CM

## 2022-11-23 DIAGNOSIS — B34.8 OTHER VIRAL INFECTIONS OF UNSPECIFIED SITE: ICD-10-CM

## 2022-11-23 DIAGNOSIS — K59.09 OTHER CONSTIPATION: ICD-10-CM

## 2022-11-23 DIAGNOSIS — Z66 DO NOT RESUSCITATE: ICD-10-CM

## 2022-11-23 DIAGNOSIS — E43 UNSPECIFIED SEVERE PROTEIN-CALORIE MALNUTRITION: ICD-10-CM

## 2022-11-23 DIAGNOSIS — C23 MALIGNANT NEOPLASM OF GALLBLADDER: ICD-10-CM

## 2022-11-23 DIAGNOSIS — R62.7 ADULT FAILURE TO THRIVE: ICD-10-CM

## 2022-11-23 DIAGNOSIS — N12 TUBULO-INTERSTITIAL NEPHRITIS, NOT SPECIFIED AS ACUTE OR CHRONIC: ICD-10-CM

## 2022-11-23 DIAGNOSIS — E11.40 TYPE 2 DIABETES MELLITUS WITH DIABETIC NEUROPATHY, UNSPECIFIED: ICD-10-CM

## 2022-11-23 DIAGNOSIS — R16.1 SPLENOMEGALY, NOT ELSEWHERE CLASSIFIED: ICD-10-CM

## 2022-11-23 DIAGNOSIS — J90 PLEURAL EFFUSION, NOT ELSEWHERE CLASSIFIED: ICD-10-CM

## 2022-11-23 DIAGNOSIS — Z79.84 LONG TERM (CURRENT) USE OF ORAL HYPOGLYCEMIC DRUGS: ICD-10-CM

## 2022-11-23 DIAGNOSIS — T83.511A INFECTION AND INFLAMMATORY REACTION DUE TO INDWELLING URETHRAL CATHETER, INITIAL ENCOUNTER: ICD-10-CM

## 2022-11-23 DIAGNOSIS — J18.9 PNEUMONIA, UNSPECIFIED ORGANISM: ICD-10-CM

## 2022-11-23 DIAGNOSIS — Z79.82 LONG TERM (CURRENT) USE OF ASPIRIN: ICD-10-CM
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
## 2022-11-23 DIAGNOSIS — Z79.899 OTHER LONG TERM (CURRENT) DRUG THERAPY: ICD-10-CM

## 2022-11-23 DIAGNOSIS — E87.1 HYPO-OSMOLALITY AND HYPONATREMIA: ICD-10-CM
